# Patient Record
Sex: FEMALE | Race: ASIAN | NOT HISPANIC OR LATINO
[De-identification: names, ages, dates, MRNs, and addresses within clinical notes are randomized per-mention and may not be internally consistent; named-entity substitution may affect disease eponyms.]

---

## 2017-05-22 PROBLEM — Z00.00 ENCOUNTER FOR PREVENTIVE HEALTH EXAMINATION: Status: ACTIVE | Noted: 2017-05-22

## 2017-05-24 ENCOUNTER — APPOINTMENT (OUTPATIENT)
Dept: ULTRASOUND IMAGING | Facility: IMAGING CENTER | Age: 47
End: 2017-05-24

## 2017-05-24 ENCOUNTER — APPOINTMENT (OUTPATIENT)
Dept: MAMMOGRAPHY | Facility: IMAGING CENTER | Age: 47
End: 2017-05-24

## 2017-06-09 ENCOUNTER — OUTPATIENT (OUTPATIENT)
Dept: OUTPATIENT SERVICES | Facility: HOSPITAL | Age: 47
LOS: 1 days | End: 2017-06-09
Payer: MEDICAID

## 2017-06-09 ENCOUNTER — APPOINTMENT (OUTPATIENT)
Dept: ULTRASOUND IMAGING | Facility: IMAGING CENTER | Age: 47
End: 2017-06-09

## 2017-06-09 ENCOUNTER — RESULT REVIEW (OUTPATIENT)
Age: 47
End: 2017-06-09

## 2017-06-09 DIAGNOSIS — Z00.8 ENCOUNTER FOR OTHER GENERAL EXAMINATION: ICD-10-CM

## 2017-06-09 PROCEDURE — 88360 TUMOR IMMUNOHISTOCHEM/MANUAL: CPT

## 2017-06-09 PROCEDURE — 77065 DX MAMMO INCL CAD UNI: CPT

## 2017-06-09 PROCEDURE — 19083 BX BREAST 1ST LESION US IMAG: CPT

## 2017-06-09 PROCEDURE — A4648: CPT

## 2017-06-09 PROCEDURE — 88305 TISSUE EXAM BY PATHOLOGIST: CPT

## 2017-07-03 ENCOUNTER — APPOINTMENT (OUTPATIENT)
Dept: SURGICAL ONCOLOGY | Facility: CLINIC | Age: 47
End: 2017-07-03

## 2017-07-03 VITALS
DIASTOLIC BLOOD PRESSURE: 103 MMHG | HEART RATE: 60 BPM | HEIGHT: 62 IN | BODY MASS INDEX: 28.52 KG/M2 | WEIGHT: 155 LBS | SYSTOLIC BLOOD PRESSURE: 181 MMHG | RESPIRATION RATE: 15 BRPM

## 2017-07-05 ENCOUNTER — APPOINTMENT (OUTPATIENT)
Dept: SURGERY | Facility: CLINIC | Age: 47
End: 2017-07-05

## 2017-07-14 ENCOUNTER — APPOINTMENT (OUTPATIENT)
Dept: MRI IMAGING | Facility: IMAGING CENTER | Age: 47
End: 2017-07-14

## 2017-07-14 ENCOUNTER — OUTPATIENT (OUTPATIENT)
Dept: OUTPATIENT SERVICES | Facility: HOSPITAL | Age: 47
LOS: 1 days | End: 2017-07-14
Payer: MEDICAID

## 2017-07-14 DIAGNOSIS — C50.912 MALIGNANT NEOPLASM OF UNSPECIFIED SITE OF LEFT FEMALE BREAST: ICD-10-CM

## 2017-07-14 PROCEDURE — C8908: CPT

## 2017-07-14 PROCEDURE — C8937: CPT

## 2017-07-14 PROCEDURE — A9585: CPT

## 2017-07-17 ENCOUNTER — OUTPATIENT (OUTPATIENT)
Dept: OUTPATIENT SERVICES | Facility: HOSPITAL | Age: 47
LOS: 1 days | End: 2017-07-17
Payer: MEDICAID

## 2017-07-17 VITALS
HEIGHT: 62 IN | WEIGHT: 154.98 LBS | OXYGEN SATURATION: 100 % | TEMPERATURE: 100 F | RESPIRATION RATE: 18 BRPM | SYSTOLIC BLOOD PRESSURE: 134 MMHG | HEART RATE: 78 BPM | DIASTOLIC BLOOD PRESSURE: 90 MMHG

## 2017-07-17 DIAGNOSIS — D49.3 NEOPLASM OF UNSPECIFIED BEHAVIOR OF BREAST: ICD-10-CM

## 2017-07-17 DIAGNOSIS — Z01.818 ENCOUNTER FOR OTHER PREPROCEDURAL EXAMINATION: ICD-10-CM

## 2017-07-17 DIAGNOSIS — C50.912 MALIGNANT NEOPLASM OF UNSPECIFIED SITE OF LEFT FEMALE BREAST: ICD-10-CM

## 2017-07-17 LAB
HCT VFR BLD CALC: 37.7 % — SIGNIFICANT CHANGE UP (ref 34.5–45)
HGB BLD-MCNC: 12.4 G/DL — SIGNIFICANT CHANGE UP (ref 11.5–15.5)
MCHC RBC-ENTMCNC: 27.8 PG — SIGNIFICANT CHANGE UP (ref 27–34)
MCHC RBC-ENTMCNC: 32.9 GM/DL — SIGNIFICANT CHANGE UP (ref 32–36)
MCV RBC AUTO: 84.5 FL — SIGNIFICANT CHANGE UP (ref 80–100)
PLATELET # BLD AUTO: 278 K/UL — SIGNIFICANT CHANGE UP (ref 150–400)
RBC # BLD: 4.46 M/UL — SIGNIFICANT CHANGE UP (ref 3.8–5.2)
RBC # FLD: 13.6 % — SIGNIFICANT CHANGE UP (ref 10.3–14.5)
WBC # BLD: 11.16 K/UL — HIGH (ref 3.8–10.5)
WBC # FLD AUTO: 11.16 K/UL — HIGH (ref 3.8–10.5)

## 2017-07-17 PROCEDURE — 85027 COMPLETE CBC AUTOMATED: CPT

## 2017-07-17 PROCEDURE — G0463: CPT

## 2017-07-17 NOTE — H&P PST ADULT - NSANTHOSAYNRD_GEN_A_CORE
No. YAZAN screening performed.  STOP BANG Legend: 0-2 = LOW Risk; 3-4 = INTERMEDIATE Risk; 5-8 = HIGH Risk

## 2017-07-18 ENCOUNTER — RESULT REVIEW (OUTPATIENT)
Age: 47
End: 2017-07-18

## 2017-07-18 ENCOUNTER — OUTPATIENT (OUTPATIENT)
Dept: OUTPATIENT SERVICES | Facility: HOSPITAL | Age: 47
LOS: 1 days | End: 2017-07-18
Payer: MEDICAID

## 2017-07-18 ENCOUNTER — APPOINTMENT (OUTPATIENT)
Dept: MRI IMAGING | Facility: IMAGING CENTER | Age: 47
End: 2017-07-18

## 2017-07-18 DIAGNOSIS — C50.912 MALIGNANT NEOPLASM OF UNSPECIFIED SITE OF LEFT FEMALE BREAST: ICD-10-CM

## 2017-07-18 PROCEDURE — 19085 BX BREAST 1ST LESION MR IMAG: CPT

## 2017-07-18 PROCEDURE — 77065 DX MAMMO INCL CAD UNI: CPT

## 2017-07-18 PROCEDURE — 88360 TUMOR IMMUNOHISTOCHEM/MANUAL: CPT

## 2017-07-18 PROCEDURE — 88305 TISSUE EXAM BY PATHOLOGIST: CPT

## 2017-07-18 PROCEDURE — A9585: CPT

## 2017-07-19 ENCOUNTER — CHART COPY (OUTPATIENT)
Age: 47
End: 2017-07-19

## 2017-07-19 LAB — SURGICAL PATHOLOGY STUDY: SIGNIFICANT CHANGE UP

## 2017-07-24 ENCOUNTER — APPOINTMENT (OUTPATIENT)
Dept: NUCLEAR MEDICINE | Facility: HOSPITAL | Age: 47
End: 2017-07-24

## 2017-07-24 ENCOUNTER — APPOINTMENT (OUTPATIENT)
Dept: SURGICAL ONCOLOGY | Facility: HOSPITAL | Age: 47
End: 2017-07-24

## 2017-07-24 ENCOUNTER — APPOINTMENT (OUTPATIENT)
Dept: SURGICAL ONCOLOGY | Facility: CLINIC | Age: 47
End: 2017-07-24
Payer: MEDICAID

## 2017-07-24 VITALS
DIASTOLIC BLOOD PRESSURE: 103 MMHG | HEIGHT: 62 IN | OXYGEN SATURATION: 100 % | WEIGHT: 150 LBS | TEMPERATURE: 98.4 F | RESPIRATION RATE: 16 BRPM | BODY MASS INDEX: 27.6 KG/M2 | SYSTOLIC BLOOD PRESSURE: 166 MMHG | HEART RATE: 74 BPM

## 2017-07-24 DIAGNOSIS — N64.4 MASTODYNIA: ICD-10-CM

## 2017-07-24 PROCEDURE — 99214 OFFICE O/P EST MOD 30 MIN: CPT

## 2017-07-25 ENCOUNTER — TRANSCRIPTION ENCOUNTER (OUTPATIENT)
Age: 47
End: 2017-07-25

## 2017-08-02 ENCOUNTER — APPOINTMENT (OUTPATIENT)
Dept: PLASTIC SURGERY | Facility: CLINIC | Age: 47
End: 2017-08-02
Payer: MEDICAID

## 2017-08-02 PROCEDURE — 99204 OFFICE O/P NEW MOD 45 MIN: CPT

## 2017-08-05 ENCOUNTER — OUTPATIENT (OUTPATIENT)
Dept: OUTPATIENT SERVICES | Facility: HOSPITAL | Age: 47
LOS: 1 days | End: 2017-08-05
Payer: MEDICAID

## 2017-08-05 ENCOUNTER — APPOINTMENT (OUTPATIENT)
Dept: CT IMAGING | Facility: IMAGING CENTER | Age: 47
End: 2017-08-05
Payer: MEDICAID

## 2017-08-05 DIAGNOSIS — Z00.8 ENCOUNTER FOR OTHER GENERAL EXAMINATION: ICD-10-CM

## 2017-08-05 PROCEDURE — 74174 CTA ABD&PLVS W/CONTRAST: CPT | Mod: 26

## 2017-08-05 PROCEDURE — 74174 CTA ABD&PLVS W/CONTRAST: CPT

## 2017-08-15 ENCOUNTER — APPOINTMENT (OUTPATIENT)
Dept: VASCULAR SURGERY | Facility: CLINIC | Age: 47
End: 2017-08-15
Payer: MEDICAID

## 2017-08-15 VITALS — HEART RATE: 69 BPM | DIASTOLIC BLOOD PRESSURE: 99 MMHG | SYSTOLIC BLOOD PRESSURE: 180 MMHG

## 2017-08-15 VITALS — HEIGHT: 62 IN | RESPIRATION RATE: 16 BRPM | BODY MASS INDEX: 27.6 KG/M2 | TEMPERATURE: 98.6 F | WEIGHT: 150 LBS

## 2017-08-15 PROCEDURE — 99204 OFFICE O/P NEW MOD 45 MIN: CPT

## 2017-08-15 RX ORDER — HYDROCHLOROTHIAZIDE 25 MG
25 TABLET ORAL
Refills: 0 | Status: COMPLETED | COMMUNITY
End: 2017-08-15

## 2017-08-17 ENCOUNTER — APPOINTMENT (OUTPATIENT)
Dept: INTERVENTIONAL RADIOLOGY/VASCULAR | Facility: CLINIC | Age: 47
End: 2017-08-17
Payer: MEDICAID

## 2017-08-17 VITALS
WEIGHT: 150 LBS | HEIGHT: 66 IN | OXYGEN SATURATION: 100 % | BODY MASS INDEX: 24.11 KG/M2 | TEMPERATURE: 98.5 F | RESPIRATION RATE: 16 BRPM | DIASTOLIC BLOOD PRESSURE: 91 MMHG | HEART RATE: 68 BPM | SYSTOLIC BLOOD PRESSURE: 170 MMHG

## 2017-08-17 DIAGNOSIS — Z63.4 DISAPPEARANCE AND DEATH OF FAMILY MEMBER: ICD-10-CM

## 2017-08-17 DIAGNOSIS — R93.5 ABNORMAL FINDINGS ON DIAGNOSTIC IMAGING OF OTHER ABDOMINAL REGIONS, INCLUDING RETROPERITONEUM: ICD-10-CM

## 2017-08-17 PROCEDURE — 99244 OFF/OP CNSLTJ NEW/EST MOD 40: CPT

## 2017-08-17 RX ORDER — SIMVASTATIN 10 MG/1
10 TABLET, FILM COATED ORAL
Refills: 0 | Status: ACTIVE | COMMUNITY

## 2017-08-17 SDOH — SOCIAL STABILITY - SOCIAL INSECURITY: DISSAPEARANCE AND DEATH OF FAMILY MEMBER: Z63.4

## 2017-08-21 ENCOUNTER — APPOINTMENT (OUTPATIENT)
Dept: NEPHROLOGY | Facility: CLINIC | Age: 47
End: 2017-08-21
Payer: MEDICAID

## 2017-08-21 VITALS
HEIGHT: 66 IN | BODY MASS INDEX: 24.11 KG/M2 | SYSTOLIC BLOOD PRESSURE: 142 MMHG | WEIGHT: 150 LBS | DIASTOLIC BLOOD PRESSURE: 82 MMHG | HEART RATE: 70 BPM

## 2017-08-21 VITALS — DIASTOLIC BLOOD PRESSURE: 84 MMHG | SYSTOLIC BLOOD PRESSURE: 140 MMHG | HEART RATE: 70 BPM

## 2017-08-21 PROCEDURE — 36415 COLL VENOUS BLD VENIPUNCTURE: CPT

## 2017-08-21 PROCEDURE — 99205 OFFICE O/P NEW HI 60 MIN: CPT | Mod: 25

## 2017-08-22 LAB
ALBUMIN SERPL ELPH-MCNC: 4.4 G/DL
ALDOSTERONE SERUM: 10.8 NG/DL
ANION GAP SERPL CALC-SCNC: 14 MMOL/L
APPEARANCE: CLEAR
BACTERIA: NEGATIVE
BILIRUBIN URINE: NEGATIVE
BLOOD URINE: ABNORMAL
BUN SERPL-MCNC: 11 MG/DL
CALCIUM SERPL-MCNC: 10.1 MG/DL
CHLORIDE SERPL-SCNC: 100 MMOL/L
CO2 SERPL-SCNC: 25 MMOL/L
COLOR: YELLOW
CREAT SERPL-MCNC: 0.76 MG/DL
CREAT SPEC-SCNC: 38 MG/DL
GLUCOSE QUALITATIVE U: NORMAL MG/DL
GLUCOSE SERPL-MCNC: 93 MG/DL
HYALINE CASTS: 0 /LPF
KETONES URINE: NEGATIVE
LEUKOCYTE ESTERASE URINE: NEGATIVE
MICROALBUMIN 24H UR DL<=1MG/L-MCNC: 1.7 MG/DL
MICROALBUMIN/CREAT 24H UR-RTO: 45 MG/G
MICROSCOPIC-UA: NORMAL
NITRITE URINE: NEGATIVE
PH URINE: 7
PHOSPHATE SERPL-MCNC: 3.7 MG/DL
POTASSIUM SERPL-SCNC: 4.7 MMOL/L
PROTEIN URINE: NEGATIVE MG/DL
RED BLOOD CELLS URINE: 1 /HPF
SODIUM SERPL-SCNC: 139 MMOL/L
SPECIFIC GRAVITY URINE: 1.01
SQUAMOUS EPITHELIAL CELLS: 0 /HPF
URATE SERPL-MCNC: 4.3 MG/DL
UROBILINOGEN URINE: NORMAL MG/DL
WHITE BLOOD CELLS URINE: 0 /HPF

## 2017-08-25 LAB — RENIN ACTIVITY, PLASMA: 1.69 NG/ML/HR

## 2017-08-27 LAB
METANEPHRINE, PL: 14 PG/ML
NORMETANEPHRINE, PL: 138 PG/ML

## 2017-08-28 ENCOUNTER — APPOINTMENT (OUTPATIENT)
Dept: NUCLEAR MEDICINE | Facility: HOSPITAL | Age: 47
End: 2017-08-28

## 2017-09-18 ENCOUNTER — OUTPATIENT (OUTPATIENT)
Dept: OUTPATIENT SERVICES | Facility: HOSPITAL | Age: 47
LOS: 1 days | End: 2017-09-18
Payer: MEDICAID

## 2017-09-18 VITALS
SYSTOLIC BLOOD PRESSURE: 152 MMHG | OXYGEN SATURATION: 100 % | RESPIRATION RATE: 18 BRPM | WEIGHT: 149.91 LBS | HEART RATE: 60 BPM | DIASTOLIC BLOOD PRESSURE: 88 MMHG | HEIGHT: 62 IN

## 2017-09-18 DIAGNOSIS — Z01.818 ENCOUNTER FOR OTHER PREPROCEDURAL EXAMINATION: ICD-10-CM

## 2017-09-18 DIAGNOSIS — C50.912 MALIGNANT NEOPLASM OF UNSPECIFIED SITE OF LEFT FEMALE BREAST: ICD-10-CM

## 2017-09-18 DIAGNOSIS — C80.1 MALIGNANT (PRIMARY) NEOPLASM, UNSPECIFIED: ICD-10-CM

## 2017-09-18 LAB
ANION GAP SERPL CALC-SCNC: 5 MMOL/L — SIGNIFICANT CHANGE UP (ref 5–17)
APTT BLD: 34.6 SEC — SIGNIFICANT CHANGE UP (ref 27.5–37.4)
BUN SERPL-MCNC: 8 MG/DL — SIGNIFICANT CHANGE UP (ref 7–18)
CALCIUM SERPL-MCNC: 9.5 MG/DL — SIGNIFICANT CHANGE UP (ref 8.4–10.5)
CHLORIDE SERPL-SCNC: 111 MMOL/L — HIGH (ref 96–108)
CO2 SERPL-SCNC: 27 MMOL/L — SIGNIFICANT CHANGE UP (ref 22–31)
CREAT SERPL-MCNC: 0.69 MG/DL — SIGNIFICANT CHANGE UP (ref 0.5–1.3)
GLUCOSE SERPL-MCNC: 86 MG/DL — SIGNIFICANT CHANGE UP (ref 70–99)
HCT VFR BLD CALC: 39.9 % — SIGNIFICANT CHANGE UP (ref 34.5–45)
HGB BLD-MCNC: 13 G/DL — SIGNIFICANT CHANGE UP (ref 11.5–15.5)
INR BLD: 1.11 RATIO — SIGNIFICANT CHANGE UP (ref 0.88–1.16)
MCHC RBC-ENTMCNC: 28.8 PG — SIGNIFICANT CHANGE UP (ref 27–34)
MCHC RBC-ENTMCNC: 32.7 GM/DL — SIGNIFICANT CHANGE UP (ref 32–36)
MCV RBC AUTO: 87.9 FL — SIGNIFICANT CHANGE UP (ref 80–100)
PLATELET # BLD AUTO: 255 K/UL — SIGNIFICANT CHANGE UP (ref 150–400)
POTASSIUM SERPL-MCNC: 4.4 MMOL/L — SIGNIFICANT CHANGE UP (ref 3.5–5.3)
POTASSIUM SERPL-SCNC: 4.4 MMOL/L — SIGNIFICANT CHANGE UP (ref 3.5–5.3)
PROTHROM AB SERPL-ACNC: 12.1 SEC — SIGNIFICANT CHANGE UP (ref 9.8–12.7)
RBC # BLD: 4.54 M/UL — SIGNIFICANT CHANGE UP (ref 3.8–5.2)
RBC # FLD: 13.5 % — SIGNIFICANT CHANGE UP (ref 10.3–14.5)
SODIUM SERPL-SCNC: 143 MMOL/L — SIGNIFICANT CHANGE UP (ref 135–145)
WBC # BLD: 10.6 K/UL — HIGH (ref 3.8–10.5)
WBC # FLD AUTO: 10.6 K/UL — HIGH (ref 3.8–10.5)

## 2017-09-18 PROCEDURE — 85730 THROMBOPLASTIN TIME PARTIAL: CPT

## 2017-09-18 PROCEDURE — 85027 COMPLETE CBC AUTOMATED: CPT

## 2017-09-18 PROCEDURE — 86850 RBC ANTIBODY SCREEN: CPT

## 2017-09-18 PROCEDURE — G0463: CPT

## 2017-09-18 PROCEDURE — 86901 BLOOD TYPING SEROLOGIC RH(D): CPT

## 2017-09-18 PROCEDURE — 86900 BLOOD TYPING SEROLOGIC ABO: CPT

## 2017-09-18 PROCEDURE — 80048 BASIC METABOLIC PNL TOTAL CA: CPT

## 2017-09-18 PROCEDURE — 85610 PROTHROMBIN TIME: CPT

## 2017-09-18 RX ORDER — FAMOTIDINE 10 MG/ML
0 INJECTION INTRAVENOUS
Qty: 0 | Refills: 0 | COMMUNITY

## 2017-09-18 NOTE — H&P PST ADULT - RS GEN PE MLT RESP DETAILS PC
no chest wall tenderness/clear to auscultation bilaterally/respirations non-labored/breath sounds equal/airway patent/good air movement/normal

## 2017-09-18 NOTE — H&P PST ADULT - ASSESSMENT
48 y/o female with h/o HTN, and Malignant neoplasm of the left breast is scheduled for left breast mastectomy on 9/28/17

## 2017-09-18 NOTE — H&P PST ADULT - HISTORY OF PRESENT ILLNESS
Pt presented with c/o tumor on the left breast, since 4 months, which was noticed in the mammogram. Pt did not feel the lump before the mammo. But now pt states that she is able to feel the lump. Pt also has pain on the  breast 7/10

## 2017-09-25 ENCOUNTER — APPOINTMENT (OUTPATIENT)
Dept: NEPHROLOGY | Facility: CLINIC | Age: 47
End: 2017-09-25
Payer: MEDICAID

## 2017-09-25 VITALS
OXYGEN SATURATION: 100 % | DIASTOLIC BLOOD PRESSURE: 86 MMHG | BODY MASS INDEX: 25.23 KG/M2 | SYSTOLIC BLOOD PRESSURE: 134 MMHG | HEART RATE: 67 BPM | WEIGHT: 157 LBS | HEIGHT: 66 IN

## 2017-09-25 PROCEDURE — 99214 OFFICE O/P EST MOD 30 MIN: CPT

## 2017-09-27 RX ORDER — SODIUM CHLORIDE 9 MG/ML
3 INJECTION INTRAMUSCULAR; INTRAVENOUS; SUBCUTANEOUS EVERY 8 HOURS
Qty: 0 | Refills: 0 | Status: DISCONTINUED | OUTPATIENT
Start: 2017-09-28 | End: 2017-10-03

## 2017-09-28 ENCOUNTER — TRANSCRIPTION ENCOUNTER (OUTPATIENT)
Age: 47
End: 2017-09-28

## 2017-09-28 ENCOUNTER — RESULT REVIEW (OUTPATIENT)
Age: 47
End: 2017-09-28

## 2017-09-28 ENCOUNTER — INPATIENT (INPATIENT)
Facility: HOSPITAL | Age: 47
LOS: 4 days | Discharge: HOME HEALTH | DRG: 571 | End: 2017-10-03
Attending: SPECIALIST | Admitting: SPECIALIST
Payer: MEDICAID

## 2017-09-28 ENCOUNTER — APPOINTMENT (OUTPATIENT)
Dept: SURGICAL ONCOLOGY | Facility: HOSPITAL | Age: 47
End: 2017-09-28
Payer: MEDICAID

## 2017-09-28 VITALS
DIASTOLIC BLOOD PRESSURE: 80 MMHG | SYSTOLIC BLOOD PRESSURE: 152 MMHG | WEIGHT: 149.91 LBS | TEMPERATURE: 98 F | HEART RATE: 69 BPM | HEIGHT: 62 IN | OXYGEN SATURATION: 100 % | RESPIRATION RATE: 18 BRPM

## 2017-09-28 DIAGNOSIS — I10 ESSENTIAL (PRIMARY) HYPERTENSION: ICD-10-CM

## 2017-09-28 DIAGNOSIS — C50.912 MALIGNANT NEOPLASM OF UNSPECIFIED SITE OF LEFT FEMALE BREAST: ICD-10-CM

## 2017-09-28 DIAGNOSIS — Z90.12 ACQUIRED ABSENCE OF LEFT BREAST AND NIPPLE: Chronic | ICD-10-CM

## 2017-09-28 DIAGNOSIS — Z29.9 ENCOUNTER FOR PROPHYLACTIC MEASURES, UNSPECIFIED: ICD-10-CM

## 2017-09-28 LAB
BLD GP AB SCN SERPL QL: SIGNIFICANT CHANGE UP
HCG UR QL: NEGATIVE — SIGNIFICANT CHANGE UP

## 2017-09-28 PROCEDURE — 38790 INJECT FOR LYMPHATIC X-RAY: CPT | Mod: 59

## 2017-09-28 PROCEDURE — 38900 IO MAP OF SENT LYMPH NODE: CPT

## 2017-09-28 PROCEDURE — S2068: CPT | Mod: 62,LT

## 2017-09-28 PROCEDURE — 49999 UNLISTED PX ABD PERTM&OMN: CPT

## 2017-09-28 PROCEDURE — 78195 LYMPH SYSTEM IMAGING: CPT | Mod: 26

## 2017-09-28 PROCEDURE — 88331 PATH CONSLTJ SURG 1 BLK 1SPC: CPT | Mod: 26

## 2017-09-28 PROCEDURE — 38308 INCISION OF LYMPH CHANNELS: CPT

## 2017-09-28 PROCEDURE — S2068: CPT | Mod: LT

## 2017-09-28 PROCEDURE — 88305 TISSUE EXAM BY PATHOLOGIST: CPT | Mod: 26

## 2017-09-28 PROCEDURE — 88334 PATH CONSLTJ SURG CYTO XM EA: CPT | Mod: 26,59

## 2017-09-28 PROCEDURE — 19307 MAST MOD RAD: CPT | Mod: LT

## 2017-09-28 PROCEDURE — 38792 RA TRACER ID OF SENTINL NODE: CPT | Mod: 59

## 2017-09-28 PROCEDURE — 88307 TISSUE EXAM BY PATHOLOGIST: CPT | Mod: 26

## 2017-09-28 PROCEDURE — 38530 BIOPSY/REMOVAL LYMPH NODES: CPT | Mod: LT

## 2017-09-28 RX ORDER — ENOXAPARIN SODIUM 100 MG/ML
40 INJECTION SUBCUTANEOUS DAILY
Qty: 0 | Refills: 0 | Status: DISCONTINUED | OUTPATIENT
Start: 2017-09-29 | End: 2017-10-03

## 2017-09-28 RX ORDER — SODIUM CHLORIDE 9 MG/ML
3 INJECTION INTRAMUSCULAR; INTRAVENOUS; SUBCUTANEOUS EVERY 8 HOURS
Qty: 0 | Refills: 0 | Status: DISCONTINUED | OUTPATIENT
Start: 2017-09-28 | End: 2017-09-28

## 2017-09-28 RX ORDER — HYDROMORPHONE HYDROCHLORIDE 2 MG/ML
0.5 INJECTION INTRAMUSCULAR; INTRAVENOUS; SUBCUTANEOUS
Qty: 0 | Refills: 0 | Status: DISCONTINUED | OUTPATIENT
Start: 2017-09-28 | End: 2017-09-30

## 2017-09-28 RX ORDER — ONDANSETRON 8 MG/1
4 TABLET, FILM COATED ORAL EVERY 6 HOURS
Qty: 0 | Refills: 0 | Status: DISCONTINUED | OUTPATIENT
Start: 2017-09-28 | End: 2017-09-30

## 2017-09-28 RX ORDER — ACETAMINOPHEN 500 MG
1000 TABLET ORAL ONCE
Qty: 0 | Refills: 0 | Status: COMPLETED | OUTPATIENT
Start: 2017-09-28 | End: 2017-09-29

## 2017-09-28 RX ORDER — SODIUM CHLORIDE 9 MG/ML
1000 INJECTION INTRAMUSCULAR; INTRAVENOUS; SUBCUTANEOUS
Qty: 0 | Refills: 0 | Status: DISCONTINUED | OUTPATIENT
Start: 2017-09-28 | End: 2017-09-29

## 2017-09-28 RX ORDER — NALOXONE HYDROCHLORIDE 4 MG/.1ML
0.1 SPRAY NASAL
Qty: 0 | Refills: 0 | Status: DISCONTINUED | OUTPATIENT
Start: 2017-09-28 | End: 2017-10-02

## 2017-09-28 RX ORDER — KETOROLAC TROMETHAMINE 30 MG/ML
30 SYRINGE (ML) INJECTION EVERY 6 HOURS
Qty: 0 | Refills: 0 | Status: DISCONTINUED | OUTPATIENT
Start: 2017-09-28 | End: 2017-09-30

## 2017-09-28 RX ORDER — ONDANSETRON 8 MG/1
4 TABLET, FILM COATED ORAL EVERY 6 HOURS
Qty: 0 | Refills: 0 | Status: DISCONTINUED | OUTPATIENT
Start: 2017-09-28 | End: 2017-10-02

## 2017-09-28 RX ORDER — ONDANSETRON 8 MG/1
4 TABLET, FILM COATED ORAL ONCE
Qty: 0 | Refills: 0 | Status: DISCONTINUED | OUTPATIENT
Start: 2017-09-28 | End: 2017-09-30

## 2017-09-28 RX ORDER — HYDROMORPHONE HYDROCHLORIDE 2 MG/ML
30 INJECTION INTRAMUSCULAR; INTRAVENOUS; SUBCUTANEOUS
Qty: 0 | Refills: 0 | Status: DISCONTINUED | OUTPATIENT
Start: 2017-09-28 | End: 2017-09-28

## 2017-09-28 RX ORDER — MORPHINE SULFATE 50 MG/1
2 CAPSULE, EXTENDED RELEASE ORAL EVERY 4 HOURS
Qty: 0 | Refills: 0 | Status: DISCONTINUED | OUTPATIENT
Start: 2017-09-28 | End: 2017-10-03

## 2017-09-28 RX ADMIN — SODIUM CHLORIDE 3 MILLILITER(S): 9 INJECTION INTRAMUSCULAR; INTRAVENOUS; SUBCUTANEOUS at 21:59

## 2017-09-28 RX ADMIN — SODIUM CHLORIDE 125 MILLILITER(S): 9 INJECTION INTRAMUSCULAR; INTRAVENOUS; SUBCUTANEOUS at 20:00

## 2017-09-28 NOTE — BRIEF OPERATIVE NOTE - PROCEDURE
<<-----Click on this checkbox to enter Procedure SE flap, free  09/28/2017  right SE flap to left breast for breast reconstruction after mastectomy  Active  [email or web address]

## 2017-09-28 NOTE — CONSULT NOTE ADULT - ASSESSMENT
47 year old female medical history of HTN and HLD, found to have a left sided breast carcinoma and was scheduled for left mastectomy today. She noticed tumour on the breast 4 months ago on a mammogram and presented with a 7/10 pain over the breast. 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 She had positive sentinel lymph node. Procedures done today: Axillary lymph node dissection on left  and Left mastectomy with axillary lymph node dissection. Plastics were called a abdominal flap was used for resconstruciton of the breast. 4 JADE drains were placed; 2 over the left breast and 2 both sides of the abdominal flap. Patient was given IV fluids but no intra-op antibiotics or blood. She was intubated for the procedure but was successfully extubated in the recovery room. She has a left radial line for monitoring and a Vioptex device for  perfusion monitoring of the surgical site. She was transferred to ICU for post-op monitoring.

## 2017-09-28 NOTE — BRIEF OPERATIVE NOTE - PROCEDURE
<<-----Click on this checkbox to enter Procedure Axillary lymph node dissection on left  09/28/2017    Active  [email or web address]  Left mastectomy with axillary lymph node dissection  09/28/2017    Active  [email or web address]

## 2017-09-28 NOTE — PROGRESS NOTE ADULT - SUBJECTIVE AND OBJECTIVE BOX
POSTOP NOTE    Patient is doing okay.  S/P Left Mastectomy, [place] biopsy & SE Flap      Vital Signs Last 24 Hrs  T(C): 36.4 (28 Sep 2017 21:26), Max: 36.7 (28 Sep 2017 09:18)  T(F): 97.5 (28 Sep 2017 21:26), Max: 98 (28 Sep 2017 09:18)  HR: 82 (28 Sep 2017 23:00) (69 - 105)  BP: 98/55 (28 Sep 2017 23:00) (97/57 - 152/80)  BP(mean): 64 (28 Sep 2017 23:00) (64 - 74)  RR: 15 (28 Sep 2017 23:00) (15 - 23)  SpO2: 100% (28 Sep 2017 23:00) (99% - 100%)    Daily Height in cm: 157.48 (28 Sep 2017 09:18)    Daily Weight in k.9 (28 Sep 2017 20:00)    I&O's Detail    28 Sep 2017 07:01  -  28 Sep 2017 23:37  --------------------------------------------------------  IN:    sodium chloride 0.9%.: 500 mL  Total IN: 500 mL    OUT:    Bulb: 30 mL    Bulb: 60 mL    Bulb: 60 mL    Indwelling Catheter - Urethral: 235 mL  Total OUT: 385 mL    Total NET: 115 mL          MEDICATIONS  (STANDING):  sodium chloride 0.9% lock flush 3 milliLiter(s) IV Push every 8 hours  acetaminophen  IVPB. 1000 milliGRAM(s) IV Intermittent once  ketorolac   Injectable 30 milliGRAM(s) IV Push every 6 hours  sodium chloride 0.9%. 1000 milliLiter(s) (125 mL/Hr) IV Continuous <Continuous>    MEDICATIONS  (PRN):  HYDROmorphone  Injectable 0.5 milliGRAM(s) IV Push every 10 minutes PRN Severe Pain (7 - 10)  ondansetron Injectable 4 milliGRAM(s) IV Push once PRN Nausea and/or Vomiting  naloxone Injectable 0.1 milliGRAM(s) IV Push every 3 minutes PRN For ANY of the following changes in patient status:  A. RR LESS THAN 10 breaths per minute, B. Oxygen saturation LESS THAN 90%, C. Sedation score of 6  ondansetron Injectable 4 milliGRAM(s) IV Push every 6 hours PRN Nausea  ondansetron Injectable 4 milliGRAM(s) IV Push every 6 hours PRN Nausea  morphine  - Injectable 2 milliGRAM(s) IV Push every 4 hours PRN breakthrough pain      PHYSICAL EXAM:  GENERAL: In no acute distress  CHEST/LUNG: Clear to percussion bilaterally; Normal respiratory effort    Left Breast dressing clean  Bioptic 50%  +Doppler SE arterial bruit    HEART: Regular rate and rhythm  ABDOMEN: Soft, Nontender, Nondistended; Bowel sounds present  EXTREMITIES:  No clubbing, cyanosis, or edema

## 2017-09-28 NOTE — CONSULT NOTE ADULT - SUBJECTIVE AND OBJECTIVE BOX
47 year old female medical history of HTN and HLD, found to have a left sided breast carcinoma and was scheduled for left mastectomy today. She noticed tumour on the breast 4 months ago on a mammogram and presented with a 7/10 pain over the breast. 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 She had positive sentinel lymph node. Procedures done today. Axillary lymph node dissection on left  and Left mastectomy with axillary lymph node dissection. Plastics were called a abdominal flap was used for resconstruciton of the breast. 4 JADE drains were placed; 2 over the left breast and 2 both sides of the abdominal flap. 47 year old female medical history of HTN and HLD, found to have a left sided breast carcinoma and was scheduled for left mastectomy today. She noticed tumour on the breast 4 months ago on a mammogram and presented with a 7/10 pain over the breast. 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 She had positive sentinel lymph node. Procedures done today. Axillary lymph node dissection on left  and Left mastectomy with axillary lymph node dissection. Plastics were called a abdominal flap was used for resconstruciton of the breast. 4 JADE drains were placed; 2 over the left breast and 2 both sides of the abdominal flap. Patient was given IV fluids but no intra-op antibiotics or blood. She was intubated for the procedure but was successfully extubated in the recovery room. She has a left radial line for monitoring and a Vioptex device for  perfusion monitoring of the surgical site. She was transferred to ICU for post-op monitoring.      PMH/PSH: Malignant neoplasm  Hypertension  No pertinent past medical history  No significant past surgical history  ,   Allergy: No Known Allergies  ,   Social history: ,   Family History: No pertinent family history in first degree relatives    Anesthesia reaction (prior) -    ECHO/ stress test: .   Colonoscopy/EGD- .   Code status:     Meds at home:  Meds in hospital:  sodium chloride 0.9% lock flush 3 milliLiter(s) IV Push every 8 hours  HYDROmorphone  Injectable 0.5 milliGRAM(s) IV Push every 10 minutes PRN  ondansetron Injectable 4 milliGRAM(s) IV Push once PRN  HYDROmorphone PCA (1 mG/mL) 30 milliLiter(s) PCA Continuous PCA Continuous  naloxone Injectable 0.1 milliGRAM(s) IV Push every 3 minutes PRN  ondansetron Injectable 4 milliGRAM(s) IV Push every 6 hours PRN  acetaminophen  IVPB. 1000 milliGRAM(s) IV Intermittent once  ketorolac   Injectable 30 milliGRAM(s) IV Push every 6 hours  ondansetron Injectable 4 milliGRAM(s) IV Push every 6 hours PRN  sodium chloride 0.9%. 1000 milliLiter(s) IV Continuous <Continuous>  morphine  - Injectable 2 milliGRAM(s) IV Push every 4 hours PRN      VITALS:  T(F): 97.5 (09-28-17 @ 20:00), Max: 98 (09-28-17 @ 09:18)  HR: 82 (09-28-17 @ 20:45)  BP: 128/58 (09-28-17 @ 20:00)  RR: 18 (09-28-17 @ 20:45)  SpO2: 100% (09-28-17 @ 20:45)  Wt(kg): --    Height (cm): 157.48 (09-28 @ 09:18)  Weight (kg): 71.9 (09-28 @ 20:00)  BMI (kg/m2): 29 (09-28 @ 20:00)  BSA (m2): 1.73 (09-28 @ 20:00)              REVIEW OF SYSTEMS: unable to obtain.         Imaging Personally Reviewed:  [+ ] YES  [ ] NO    Consultant(s) Notes Reviewed:  [+ ] YES  [ ] NO    PHYSICAL EXAM:  GENERAL: NAD, well-groomed, well-developed  HEAD:  Atraumatic, Normocephalic  EYES: EOMI, PERRLA, conjunctiva and sclera clear  ENMT: No tonsillar erythema, exudates, or enlargement; Moist mucous membranes, Good dentition, No lesions  NECK: Supple, No JVD, Normal thyroid  CHEST/LUNG: left breast scar noted. Voptex connection (45 now), 2 JADE drains, draining serosanguinous discharge.. Clear to percussion bilaterally; No rales, rhonchi, wheezing, or rubs  HEART: Regular rate and rhythm; No murmurs, rubs, or gallops  ABDOMEN: surgical scar present. +2 JADE drains, draining serosanguinous discharge.   Soft, Nontender, Nondistended; Bowel sounds present  EXTREMITIES:  2+ Peripheral Pulses, No clubbing, cyanosis, or edema  LYMPH: No lymphadenopathy noted  SKIN: No rashes or lesions    Care Discussed with Consultants/Other Providers [ +] YES  [ ] NO

## 2017-09-28 NOTE — CONSULT NOTE ADULT - PROBLEM SELECTOR RECOMMENDATION 9
s/p Axillary lymph node dissection on left  and Left mastectomy with axillary lymph node dissection. Plastics were called a abdominal flap was used for resconstruciton of the breast  -monitor output from JADE drains   - monitor perfusion via the Vioptix ( keep over 40-44; call surgery for decrease to 30 or below)  - check doppler every hours over the surgical site  - monitor for the skin color   - monitor BP; may give iv fluids but no pressors to prevent vasopressive action and necrosis of the site.   - pain management: toradol and morphine; may start PCA pump if needed   - will given iv tylenol 1g for 4 doses  - Billy Ward

## 2017-09-29 ENCOUNTER — TRANSCRIPTION ENCOUNTER (OUTPATIENT)
Age: 47
End: 2017-09-29

## 2017-09-29 DIAGNOSIS — G89.18 OTHER ACUTE POSTPROCEDURAL PAIN: ICD-10-CM

## 2017-09-29 DIAGNOSIS — Z90.12 ACQUIRED ABSENCE OF LEFT BREAST AND NIPPLE: ICD-10-CM

## 2017-09-29 LAB
24R-OH-CALCIDIOL SERPL-MCNC: 16.2 NG/ML — LOW (ref 30–80)
ALBUMIN SERPL ELPH-MCNC: 2.6 G/DL — LOW (ref 3.5–5)
ALP SERPL-CCNC: 47 U/L — SIGNIFICANT CHANGE UP (ref 40–120)
ALT FLD-CCNC: 21 U/L DA — SIGNIFICANT CHANGE UP (ref 10–60)
ANION GAP SERPL CALC-SCNC: 14 MMOL/L — SIGNIFICANT CHANGE UP (ref 5–17)
AST SERPL-CCNC: 31 U/L — SIGNIFICANT CHANGE UP (ref 10–40)
BASOPHILS # BLD AUTO: 0.1 K/UL — SIGNIFICANT CHANGE UP (ref 0–0.2)
BASOPHILS NFR BLD AUTO: 0.8 % — SIGNIFICANT CHANGE UP (ref 0–2)
BILIRUB SERPL-MCNC: 0.6 MG/DL — SIGNIFICANT CHANGE UP (ref 0.2–1.2)
BUN SERPL-MCNC: 6 MG/DL — LOW (ref 7–18)
CALCIUM SERPL-MCNC: 7.9 MG/DL — LOW (ref 8.4–10.5)
CHLORIDE SERPL-SCNC: 107 MMOL/L — SIGNIFICANT CHANGE UP (ref 96–108)
CHOLEST SERPL-MCNC: 137 MG/DL — SIGNIFICANT CHANGE UP (ref 10–199)
CO2 SERPL-SCNC: 20 MMOL/L — LOW (ref 22–31)
CREAT SERPL-MCNC: 0.97 MG/DL — SIGNIFICANT CHANGE UP (ref 0.5–1.3)
EOSINOPHIL # BLD AUTO: 0 K/UL — SIGNIFICANT CHANGE UP (ref 0–0.5)
EOSINOPHIL NFR BLD AUTO: 0 % — SIGNIFICANT CHANGE UP (ref 0–6)
FOLATE SERPL-MCNC: 14.8 NG/ML — SIGNIFICANT CHANGE UP (ref 4.8–24.2)
GLUCOSE SERPL-MCNC: 152 MG/DL — HIGH (ref 70–99)
HBA1C BLD-MCNC: 6.1 % — HIGH (ref 4–5.6)
HCT VFR BLD CALC: 34 % — LOW (ref 34.5–45)
HDLC SERPL-MCNC: 44 MG/DL — SIGNIFICANT CHANGE UP (ref 40–125)
HGB BLD-MCNC: 11.5 G/DL — SIGNIFICANT CHANGE UP (ref 11.5–15.5)
LIPID PNL WITH DIRECT LDL SERPL: 71 MG/DL — SIGNIFICANT CHANGE UP
LYMPHOCYTES # BLD AUTO: 1.3 K/UL — SIGNIFICANT CHANGE UP (ref 1–3.3)
LYMPHOCYTES # BLD AUTO: 10.4 % — LOW (ref 13–44)
MAGNESIUM SERPL-MCNC: 1.5 MG/DL — LOW (ref 1.6–2.6)
MCHC RBC-ENTMCNC: 30.1 PG — SIGNIFICANT CHANGE UP (ref 27–34)
MCHC RBC-ENTMCNC: 34 GM/DL — SIGNIFICANT CHANGE UP (ref 32–36)
MCV RBC AUTO: 88.6 FL — SIGNIFICANT CHANGE UP (ref 80–100)
MONOCYTES # BLD AUTO: 0.9 K/UL — SIGNIFICANT CHANGE UP (ref 0–0.9)
MONOCYTES NFR BLD AUTO: 7.1 % — SIGNIFICANT CHANGE UP (ref 2–14)
NEUTROPHILS # BLD AUTO: 10.4 K/UL — HIGH (ref 1.8–7.4)
NEUTROPHILS NFR BLD AUTO: 81.8 % — HIGH (ref 43–77)
PHOSPHATE SERPL-MCNC: 2.9 MG/DL — SIGNIFICANT CHANGE UP (ref 2.5–4.5)
PLATELET # BLD AUTO: 178 K/UL — SIGNIFICANT CHANGE UP (ref 150–400)
POTASSIUM SERPL-MCNC: 4.4 MMOL/L — SIGNIFICANT CHANGE UP (ref 3.5–5.3)
POTASSIUM SERPL-SCNC: 4.4 MMOL/L — SIGNIFICANT CHANGE UP (ref 3.5–5.3)
PROT SERPL-MCNC: 6.1 G/DL — SIGNIFICANT CHANGE UP (ref 6–8.3)
RBC # BLD: 3.84 M/UL — SIGNIFICANT CHANGE UP (ref 3.8–5.2)
RBC # FLD: 13.6 % — SIGNIFICANT CHANGE UP (ref 10.3–14.5)
SODIUM SERPL-SCNC: 141 MMOL/L — SIGNIFICANT CHANGE UP (ref 135–145)
TOTAL CHOLESTEROL/HDL RATIO MEASUREMENT: 3.1 RATIO — LOW (ref 3.3–7.1)
TRIGL SERPL-MCNC: 108 MG/DL — SIGNIFICANT CHANGE UP (ref 10–149)
TSH SERPL-MCNC: 0.62 UU/ML — SIGNIFICANT CHANGE UP (ref 0.34–4.82)
VIT B12 SERPL-MCNC: 618 PG/ML — SIGNIFICANT CHANGE UP (ref 243–894)
WBC # BLD: 12.7 K/UL — HIGH (ref 3.8–10.5)
WBC # FLD AUTO: 12.7 K/UL — HIGH (ref 3.8–10.5)

## 2017-09-29 PROCEDURE — 99233 SBSQ HOSP IP/OBS HIGH 50: CPT

## 2017-09-29 PROCEDURE — 71010: CPT | Mod: 26

## 2017-09-29 RX ORDER — MAGNESIUM SULFATE 500 MG/ML
1 VIAL (ML) INJECTION ONCE
Qty: 0 | Refills: 0 | Status: COMPLETED | OUTPATIENT
Start: 2017-09-29 | End: 2017-09-29

## 2017-09-29 RX ORDER — ACETAMINOPHEN 500 MG
1000 TABLET ORAL ONCE
Qty: 0 | Refills: 0 | Status: COMPLETED | OUTPATIENT
Start: 2017-09-29 | End: 2017-09-29

## 2017-09-29 RX ADMIN — SODIUM CHLORIDE 3 MILLILITER(S): 9 INJECTION INTRAMUSCULAR; INTRAVENOUS; SUBCUTANEOUS at 05:20

## 2017-09-29 RX ADMIN — Medication 30 MILLIGRAM(S): at 14:15

## 2017-09-29 RX ADMIN — Medication 30 MILLIGRAM(S): at 23:49

## 2017-09-29 RX ADMIN — Medication 30 MILLIGRAM(S): at 20:23

## 2017-09-29 RX ADMIN — ENOXAPARIN SODIUM 40 MILLIGRAM(S): 100 INJECTION SUBCUTANEOUS at 11:38

## 2017-09-29 RX ADMIN — Medication 30 MILLIGRAM(S): at 11:37

## 2017-09-29 RX ADMIN — Medication 400 MILLIGRAM(S): at 11:32

## 2017-09-29 RX ADMIN — SODIUM CHLORIDE 125 MILLILITER(S): 9 INJECTION INTRAMUSCULAR; INTRAVENOUS; SUBCUTANEOUS at 03:48

## 2017-09-29 RX ADMIN — Medication 100 GRAM(S): at 05:20

## 2017-09-29 RX ADMIN — Medication 1000 MILLIGRAM(S): at 11:45

## 2017-09-29 RX ADMIN — Medication 30 MILLIGRAM(S): at 05:38

## 2017-09-29 RX ADMIN — Medication 30 MILLIGRAM(S): at 18:17

## 2017-09-29 RX ADMIN — Medication 30 MILLIGRAM(S): at 05:19

## 2017-09-29 RX ADMIN — HYDROMORPHONE HYDROCHLORIDE 0.5 MILLIGRAM(S): 2 INJECTION INTRAMUSCULAR; INTRAVENOUS; SUBCUTANEOUS at 21:00

## 2017-09-29 RX ADMIN — Medication 30 MILLIGRAM(S): at 23:52

## 2017-09-29 RX ADMIN — Medication 1000 MILLIGRAM(S): at 04:27

## 2017-09-29 RX ADMIN — SODIUM CHLORIDE 3 MILLILITER(S): 9 INJECTION INTRAMUSCULAR; INTRAVENOUS; SUBCUTANEOUS at 12:01

## 2017-09-29 RX ADMIN — Medication 400 MILLIGRAM(S): at 04:07

## 2017-09-29 RX ADMIN — SODIUM CHLORIDE 3 MILLILITER(S): 9 INJECTION INTRAMUSCULAR; INTRAVENOUS; SUBCUTANEOUS at 20:23

## 2017-09-29 RX ADMIN — HYDROMORPHONE HYDROCHLORIDE 0.5 MILLIGRAM(S): 2 INJECTION INTRAMUSCULAR; INTRAVENOUS; SUBCUTANEOUS at 22:57

## 2017-09-29 NOTE — PROGRESS NOTE ADULT - SUBJECTIVE AND OBJECTIVE BOX
Pt seen and examined at bedside. No new complaints. States that pain is improving.     Flap pink  good doppler signal  good cap refill  Vioptix 38% with 90% signal  no signs of venous congestion    48 yo F POD 1 s/p Left breast mastectomy with axillary LN dissection and SE flap reconstruction    - Continue ICU care for now per Dr. Acevedo  - will continue to reassess  - regular diet, no caffeine

## 2017-09-29 NOTE — PROGRESS NOTE ADULT - SUBJECTIVE AND OBJECTIVE BOX
INTERVAL HPI/OVERNIGHT EVENTS: ***    PRESSORS: [ ] YES [x ] NO  WHICH:    Antimicrobial:    Cardiovascular:    Pulmonary:    Hematalogic:  enoxaparin Injectable 40 milliGRAM(s) SubCutaneous daily    Other:  sodium chloride 0.9% lock flush 3 milliLiter(s) IV Push every 8 hours  HYDROmorphone  Injectable 0.5 milliGRAM(s) IV Push every 10 minutes PRN  ondansetron Injectable 4 milliGRAM(s) IV Push once PRN  naloxone Injectable 0.1 milliGRAM(s) IV Push every 3 minutes PRN  ondansetron Injectable 4 milliGRAM(s) IV Push every 6 hours PRN  ketorolac   Injectable 30 milliGRAM(s) IV Push every 6 hours  ondansetron Injectable 4 milliGRAM(s) IV Push every 6 hours PRN  sodium chloride 0.9%. 1000 milliLiter(s) IV Continuous <Continuous>  morphine  - Injectable 2 milliGRAM(s) IV Push every 4 hours PRN    sodium chloride 0.9% lock flush 3 milliLiter(s) IV Push every 8 hours  HYDROmorphone  Injectable 0.5 milliGRAM(s) IV Push every 10 minutes PRN  ondansetron Injectable 4 milliGRAM(s) IV Push once PRN  naloxone Injectable 0.1 milliGRAM(s) IV Push every 3 minutes PRN  ondansetron Injectable 4 milliGRAM(s) IV Push every 6 hours PRN  ketorolac   Injectable 30 milliGRAM(s) IV Push every 6 hours  ondansetron Injectable 4 milliGRAM(s) IV Push every 6 hours PRN  sodium chloride 0.9%. 1000 milliLiter(s) IV Continuous <Continuous>  morphine  - Injectable 2 milliGRAM(s) IV Push every 4 hours PRN  enoxaparin Injectable 40 milliGRAM(s) SubCutaneous daily    Drug Dosing Weight  Height (cm): 157.48 (28 Sep 2017 09:18)  Weight (kg): 71.9 (28 Sep 2017 20:00)  BMI (kg/m2): 29 (28 Sep 2017 20:00)  BSA (m2): 1.73 (28 Sep 2017 20:00)    CENTRAL LINE: [ ] YES [x ] NO  LOCATION:   DATE INSERTED:  REMOVE: [ ] YES [ ] NO  EXPLAIN:    PICKARD: [x ] YES [ ] NO    DATE INSERTED:9/28  REMOVE:  [ ] YES [ ] NO  EXPLAIN:    A-LINE:  [ ] YES [ x] NO  LOCATION:   DATE INSERTED:  REMOVE:  [ ] YES [ ] NO  EXPLAIN:    PMH -reviewed admission note, no change since admission  Heart faliure: acute [ ] chronic [ ] acute or chronic [ ] diastolic [ ] systolic [ ] combied systolic and diastolic[ ]  ADAM: ATN[ ] renal medullary necrosis [ ] CKD I [ ]CKDII [ ]CKD III [ ]CKD IV [ ]CKD V [ ]Other pathological lesions [ ]  Abdominal Nutrition Status: malnutrition [ ] cachexia [ ] morbid obesity/BMI=40 [ ] Supplement ordered [___________]     ICU Vital Signs Last 24 Hrs  T(C): 36.2 (29 Sep 2017 05:00), Max: 36.7 (28 Sep 2017 09:18)  T(F): 97.1 (29 Sep 2017 05:00), Max: 98 (28 Sep 2017 09:18)  HR: 81 (29 Sep 2017 07:00) (69 - 105)  BP: 102/46 (29 Sep 2017 06:00) (93/50 - 152/80)  BP(mean): 60 (29 Sep 2017 06:00) (60 - 83)  ABP: 115/55 (29 Sep 2017 07:00) (103/56 - 138/74)  ABP(mean): 75 (29 Sep 2017 07:00) (70 - 97)  RR: 13 (29 Sep 2017 07:00) (13 - 23)  SpO2: 100% (29 Sep 2017 07:00) (99% - 100%)            09-28 @ 07:01  -  09-29 @ 07:00  --------------------------------------------------------  IN: 1600 mL / OUT: 950 mL / NET: 650 mL            PHYSICAL EXAM:    GENERAL: [ ]NAD, [ ]well-groomed, [ ]well-developed  HEAD:  [ ]Atraumatic, [ ]Normocephalic  EYES: [ ]EOMI, [ ]PERRLA, [ ]conjunctiva and sclera clear  ENMT: [ ]No tonsillar erythema, exudates, or enlargement; [ ]Moist mucous membranes, [ ]Good dentition, [ ]No lesions  NECK: [ ]Supple, normal appearance, [ ]No JVD; [ ]Normal thyroid; [ ]Trachea midline  NERVOUS SYSTEM:  [ ]Alert & Oriented X3, [ ]Good concentration; [ ]Motor Strength 5/5 B/L upper and lower extremities; [ ]DTRs 2+ intact and symmetric  CHEST/LUNG: [ ]No chest deformity; [ ]Normal percussion bilaterally; [ ]No rales, rhonchi, wheezing   HEART: [ ]Regular rate and rhythm; [ ]No murmurs, rubs, or gallops  ABDOMEN: [ ]Soft, Nontender, Nondistended; [ ]Bowel sounds present  EXTREMITIES:  [ ]2+ Peripheral Pulses, [ ]No clubbing, cyanosis, or edema  LYMPH: [ ]No lymphadenopathy noted  SKIN: [ ]No rashes or lesions; [ ]Good capillary refill      LABS:  CBC Full  -  ( 29 Sep 2017 03:52 )  WBC Count : 12.7 K/uL  Hemoglobin : 11.5 g/dL  Hematocrit : 34.0 %  Platelet Count - Automated : 178 K/uL  Mean Cell Volume : 88.6 fl  Mean Cell Hemoglobin : 30.1 pg  Mean Cell Hemoglobin Concentration : 34.0 gm/dL  Auto Neutrophil # : 10.4 K/uL  Auto Lymphocyte # : 1.3 K/uL  Auto Monocyte # : 0.9 K/uL  Auto Eosinophil # : 0.0 K/uL  Auto Basophil # : 0.1 K/uL  Auto Neutrophil % : 81.8 %  Auto Lymphocyte % : 10.4 %  Auto Monocyte % : 7.1 %  Auto Eosinophil % : 0.0 %  Auto Basophil % : 0.8 %    09-29    141  |  107  |  6<L>  ----------------------------<  152<H>  4.4   |  20<L>  |  0.97    Ca    7.9<L>      29 Sep 2017 03:52  Phos  2.9     09-29  Mg     1.5     09-29    TPro  6.1  /  Alb  2.6<L>  /  TBili  0.6  /  DBili  x   /  AST  31  /  ALT  21  /  AlkPhos  47  09-29            RADIOLOGY & ADDITIONAL STUDIES REVIEWED:  ***    [ ]GOALS OF CARE DISCUSSION WITH PATIENT/FAMILY/PROXY:    CRITICAL CARE TIME SPENT: 35 minutes INTERVAL HPI/OVERNIGHT EVENTS: no overnight events    PRESSORS: [ ] YES [x ] NO  WHICH:    Antimicrobial:    Cardiovascular:    Pulmonary:    Hematalogic:  enoxaparin Injectable 40 milliGRAM(s) SubCutaneous daily    Other:  sodium chloride 0.9% lock flush 3 milliLiter(s) IV Push every 8 hours  HYDROmorphone  Injectable 0.5 milliGRAM(s) IV Push every 10 minutes PRN  ondansetron Injectable 4 milliGRAM(s) IV Push once PRN  naloxone Injectable 0.1 milliGRAM(s) IV Push every 3 minutes PRN  ondansetron Injectable 4 milliGRAM(s) IV Push every 6 hours PRN  ketorolac   Injectable 30 milliGRAM(s) IV Push every 6 hours  ondansetron Injectable 4 milliGRAM(s) IV Push every 6 hours PRN  sodium chloride 0.9%. 1000 milliLiter(s) IV Continuous <Continuous>  morphine  - Injectable 2 milliGRAM(s) IV Push every 4 hours PRN    sodium chloride 0.9% lock flush 3 milliLiter(s) IV Push every 8 hours  HYDROmorphone  Injectable 0.5 milliGRAM(s) IV Push every 10 minutes PRN  ondansetron Injectable 4 milliGRAM(s) IV Push once PRN  naloxone Injectable 0.1 milliGRAM(s) IV Push every 3 minutes PRN  ondansetron Injectable 4 milliGRAM(s) IV Push every 6 hours PRN  ketorolac   Injectable 30 milliGRAM(s) IV Push every 6 hours  ondansetron Injectable 4 milliGRAM(s) IV Push every 6 hours PRN  sodium chloride 0.9%. 1000 milliLiter(s) IV Continuous <Continuous>  morphine  - Injectable 2 milliGRAM(s) IV Push every 4 hours PRN  enoxaparin Injectable 40 milliGRAM(s) SubCutaneous daily    Drug Dosing Weight  Height (cm): 157.48 (28 Sep 2017 09:18)  Weight (kg): 71.9 (28 Sep 2017 20:00)  BMI (kg/m2): 29 (28 Sep 2017 20:00)  BSA (m2): 1.73 (28 Sep 2017 20:00)    CENTRAL LINE: [ ] YES [x ] NO  LOCATION:   DATE INSERTED:  REMOVE: [ ] YES [ ] NO  EXPLAIN:    PICKARD: [x ] YES [ ] NO    DATE INSERTED:9/28  REMOVE:  [x ] YES [ ] NO  EXPLAIN:    A-LINE:  [x ] YES [ ] NO  LOCATION:   DATE INSERTED:  REMOVE:  [ x] YES [ ] NO  EXPLAIN:    PMH -reviewed admission note, no change since admission  Heart faliure: acute [ ] chronic [ ] acute or chronic [ ] diastolic [ ] systolic [ ] combied systolic and diastolic[ ]  ADAM: ATN[ ] renal medullary necrosis [ ] CKD I [ ]CKDII [ ]CKD III [ ]CKD IV [ ]CKD V [ ]Other pathological lesions [ ]  Abdominal Nutrition Status: malnutrition [ ] cachexia [ ] morbid obesity/BMI=40 [ ] Supplement ordered [___________]     ICU Vital Signs Last 24 Hrs  T(C): 36.2 (29 Sep 2017 05:00), Max: 36.7 (28 Sep 2017 09:18)  T(F): 97.1 (29 Sep 2017 05:00), Max: 98 (28 Sep 2017 09:18)  HR: 81 (29 Sep 2017 07:00) (69 - 105)  BP: 102/46 (29 Sep 2017 06:00) (93/50 - 152/80)  BP(mean): 60 (29 Sep 2017 06:00) (60 - 83)  ABP: 115/55 (29 Sep 2017 07:00) (103/56 - 138/74)  ABP(mean): 75 (29 Sep 2017 07:00) (70 - 97)  RR: 13 (29 Sep 2017 07:00) (13 - 23)  SpO2: 100% (29 Sep 2017 07:00) (99% - 100%)            09-28 @ 07:01  -  09-29 @ 07:00  --------------------------------------------------------  IN: 1600 mL / OUT: 950 mL / NET: 650 mL            PHYSICAL EXAM:    GENERAL: [x ]NAD, [x ]well-groomed, [ ]well-developed  HEAD:  [ x]Atraumatic, [x ]Normocephalic  EYES: [x ]EOMI, [ x]PERRLA, [ ]conjunctiva and sclera clear  ENMT: [x ]No tonsillar erythema, exudates, or enlargement; [ ]Moist mucous membranes, [ ]Good dentition, [ ]No lesions  NECK: [x ]Supple, normal appearance, [x ]No JVD; [x ]Normal thyroid; [x ]Trachea midline  NERVOUS SYSTEM:  [ x]Alert & Oriented X3, [ ]Good concentration; [ ]Motor Strength 5/5 B/L upper and lower extremities; [ ]DTRs 2+ intact and symmetric  CHEST/LUNG: [ ]No chest deformity; [ ]Normal percussion bilaterally; [x ]No rales, rhonchi, wheezing [x] JADE drains in place  HEART: [ x]Regular rate and rhythm; [ ]No murmurs, rubs, or gallops  ABDOMEN: [x ]Soft, Nontender, Nondistended; [ ]Bowel sounds present  EXTREMITIES:  [x ]2+ Peripheral Pulses, [ ]No clubbing, cyanosis, or edema  LYMPH: [ ]No lymphadenopathy noted  SKIN: [x ]No rashes or lesions; [x ]Good capillary refill      LABS:  CBC Full  -  ( 29 Sep 2017 03:52 )  WBC Count : 12.7 K/uL  Hemoglobin : 11.5 g/dL  Hematocrit : 34.0 %  Platelet Count - Automated : 178 K/uL  Mean Cell Volume : 88.6 fl  Mean Cell Hemoglobin : 30.1 pg  Mean Cell Hemoglobin Concentration : 34.0 gm/dL  Auto Neutrophil # : 10.4 K/uL  Auto Lymphocyte # : 1.3 K/uL  Auto Monocyte # : 0.9 K/uL  Auto Eosinophil # : 0.0 K/uL  Auto Basophil # : 0.1 K/uL  Auto Neutrophil % : 81.8 %  Auto Lymphocyte % : 10.4 %  Auto Monocyte % : 7.1 %  Auto Eosinophil % : 0.0 %  Auto Basophil % : 0.8 %    09-29    141  |  107  |  6<L>  ----------------------------<  152<H>  4.4   |  20<L>  |  0.97    Ca    7.9<L>      29 Sep 2017 03:52  Phos  2.9     09-29  Mg     1.5     09-29    TPro  6.1  /  Alb  2.6<L>  /  TBili  0.6  /  DBili  x   /  AST  31  /  ALT  21  /  AlkPhos  47  09-29            RADIOLOGY & ADDITIONAL STUDIES REVIEWED:  cxr - no infiltrate or effusion    [ x]GOALS OF CARE DISCUSSION WITH PATIENT/FAMILY/PROXY: discussed plan with family/son at bedside

## 2017-09-29 NOTE — PHYSICAL THERAPY INITIAL EVALUATION ADULT - PASSIVE RANGE OF MOTION EXAMINATION, REHAB EVAL
left shoulder flexion and abduction up to 70 degrees/no Passive ROM deficits were identified/deficits as listed below

## 2017-09-29 NOTE — PROGRESS NOTE ADULT - SUBJECTIVE AND OBJECTIVE BOX
FLAP CHECK    POD 1 for left mastectomy with axillary lymph node dissection and SE flap reconstruction    Flap is pink  good doppler signal  +brisk capillary refill  vioptix - 45%  no venous congestion    -maintain in ICU monitoring for q 1 hour flap checks at least until tomorrow per Dr. Acevedo - will continue to reassess  -may have regular diet, no caffeine

## 2017-09-29 NOTE — PROGRESS NOTE ADULT - SUBJECTIVE AND OBJECTIVE BOX
No significant events overnight  Pain under control    AVSS    Breast - flap is pink, no congestion, warm, + doppler, + brisk cap refill. Vioptic 34%  Shirley serosang  Abd - incision cdi, shirley serosang

## 2017-09-29 NOTE — PROGRESS NOTE ADULT - ATTENDING COMMENTS
47 female with hx of HTN, HLD, breast cancer, admitted for mastectomy with breast reconstruction and SE flap, now doing well.  Will advance diet, d/c fluids, d/c arterial line and bashir, OOB, incentive spirometry, PT.

## 2017-09-29 NOTE — PROGRESS NOTE ADULT - SUBJECTIVE AND OBJECTIVE BOX
46 yo female s/p left mastectomy with positive left sentinel node and subsequent left axillary dissection and SE flap reconstruction. Patient did not have any acute events overnight, pain is well controlled while lying in bed, however, she has pain when she moves around. Spoke with overnight nurse, and overnight the patient continued to have a good doppler signal over the flap as well as good capillary refill. She stated that the vioptic reading went as high as 50 and never below 30. H/H was 11.5/34.    Physical:  Vitals: 97.1; HR: 84; BP: 115/ 55; RR: 17; O2 100%  General: no acute distress  CVS/ lungs: S1/ S2 positive, air entry clear and equal bilaterally  Breast: left breast flap is pink, no congestion, brisk capillary refill, good doppler signal, no venous congestion - Vioptic readin%, all incisions are clean/ dry/ intact.   abd: soft, non-distended, mild tenderness to palpation along lower abdominal incision - JADE drains in place with serosanguinous output, umbilicus with good color - all incisions are clean/ dry/ intact.   extr: no swelling in the extremities, decreased ROM in the left upper extremity secondary to pain, no decreased sensation, no calf tenderness.    Urine output: 650  Breast drain1: 110; Breast drain 2: 70  abdomen drain right:  20; abdomen drain left: 100    -A/P: 46 yo female POD 1 from left mastectomy with left axillary dissection and SE flap breast reconstruction doing well, flap healthy  -per plastic surgery - continue flap checks q 1 hour, no caffeine  -out of bed to chair  -incentive spirometry  -start DVT ppx today  -pain control  -Is/Os

## 2017-09-29 NOTE — PROGRESS NOTE ADULT - SUBJECTIVE AND OBJECTIVE BOX
Chief Complaint: left chest wall pain    HPI:   47y Female s/p left mastectomy, with flap/reconstruction/ sentinel node biopsy, pod#1.  Pt complaining of left chest wall pain which worsens on exertion.  Pt lying in bed, nad.  No nausea or vomiting.  No chest pain or sob.        PAIN SCORE:  5/10       SCALE USED: (1-10 VNRS)    Allergies    No Known Allergies    Intolerances      MEDICATIONS  (STANDING):  sodium chloride 0.9% lock flush 3 milliLiter(s) IV Push every 8 hours  ketorolac   Injectable 30 milliGRAM(s) IV Push every 6 hours  enoxaparin Injectable 40 milliGRAM(s) SubCutaneous daily    MEDICATIONS  (PRN):  HYDROmorphone  Injectable 0.5 milliGRAM(s) IV Push every 10 minutes PRN Severe Pain (7 - 10)  ondansetron Injectable 4 milliGRAM(s) IV Push once PRN Nausea and/or Vomiting  naloxone Injectable 0.1 milliGRAM(s) IV Push every 3 minutes PRN For ANY of the following changes in patient status:  A. RR LESS THAN 10 breaths per minute, B. Oxygen saturation LESS THAN 90%, C. Sedation score of 6  ondansetron Injectable 4 milliGRAM(s) IV Push every 6 hours PRN Nausea  ondansetron Injectable 4 milliGRAM(s) IV Push every 6 hours PRN Nausea  morphine  - Injectable 2 milliGRAM(s) IV Push every 4 hours PRN breakthrough pain      PHYSICAL EXAM:    Vital Signs Last 24 Hrs  T(C): 36.4 (29 Sep 2017 08:00), Max: 36.4 (28 Sep 2017 20:00)  T(F): 97.6 (29 Sep 2017 08:00), Max: 97.6 (29 Sep 2017 08:00)  HR: 83 (29 Sep 2017 12:00) (75 - 105)  BP: 101/55 (29 Sep 2017 12:00) (93/50 - 128/58)  BP(mean): 66 (29 Sep 2017 12:00) (60 - 83)  RR: 18 (29 Sep 2017 12:00) (13 - 23)  SpO2: 100% (29 Sep 2017 12:00) (99% - 100%)             LABS:                          11.5   12.7  )-----------( 178      ( 29 Sep 2017 03:52 )             34.0     09-29    141  |  107  |  6<L>  ----------------------------<  152<H>  4.4   |  20<L>  |  0.97    Ca    7.9<L>      29 Sep 2017 03:52  Phos  2.9     09-29  Mg     1.5     09-29    TPro  6.1  /  Alb  2.6<L>  /  TBili  0.6  /  DBili  x   /  AST  31  /  ALT  21  /  AlkPhos  47  09-29          Drug Screen:        RADIOLOGY:

## 2017-09-29 NOTE — PHYSICAL THERAPY INITIAL EVALUATION ADULT - DID THE PATIENT HAVE SURGERY?
yes Axillary lymph node dissection on left; Left mastectomy with axillary lymph node dissection; SE flap, free  09/28/2017  right SE flap to left breast for breast reconstruction after mastectomy/yes

## 2017-09-29 NOTE — PHYSICAL THERAPY INITIAL EVALUATION ADULT - GENERAL OBSERVATIONS, REHAB EVAL
awake, alert, NAD; currently on O2@3L/mandi via NC; 1 JADE drain on each groain area; 2 JADE drain on left chest incision site

## 2017-09-30 LAB
ALBUMIN SERPL ELPH-MCNC: 2.1 G/DL — LOW (ref 3.5–5)
ALP SERPL-CCNC: 43 U/L — SIGNIFICANT CHANGE UP (ref 40–120)
ALT FLD-CCNC: 19 U/L DA — SIGNIFICANT CHANGE UP (ref 10–60)
ANION GAP SERPL CALC-SCNC: 5 MMOL/L — SIGNIFICANT CHANGE UP (ref 5–17)
ANION GAP SERPL CALC-SCNC: 6 MMOL/L — SIGNIFICANT CHANGE UP (ref 5–17)
ANION GAP SERPL CALC-SCNC: 6 MMOL/L — SIGNIFICANT CHANGE UP (ref 5–17)
APTT BLD: 31.2 SEC — SIGNIFICANT CHANGE UP (ref 27.5–37.4)
AST SERPL-CCNC: 29 U/L — SIGNIFICANT CHANGE UP (ref 10–40)
BASOPHILS # BLD AUTO: 0.1 K/UL — SIGNIFICANT CHANGE UP (ref 0–0.2)
BASOPHILS NFR BLD AUTO: 0.6 % — SIGNIFICANT CHANGE UP (ref 0–2)
BILIRUB SERPL-MCNC: 0.4 MG/DL — SIGNIFICANT CHANGE UP (ref 0.2–1.2)
BUN SERPL-MCNC: 11 MG/DL — SIGNIFICANT CHANGE UP (ref 7–18)
BUN SERPL-MCNC: 8 MG/DL — SIGNIFICANT CHANGE UP (ref 7–18)
BUN SERPL-MCNC: 9 MG/DL — SIGNIFICANT CHANGE UP (ref 7–18)
CALCIUM SERPL-MCNC: 7.7 MG/DL — LOW (ref 8.4–10.5)
CALCIUM SERPL-MCNC: 8 MG/DL — LOW (ref 8.4–10.5)
CALCIUM SERPL-MCNC: 8 MG/DL — LOW (ref 8.4–10.5)
CHLORIDE SERPL-SCNC: 110 MMOL/L — HIGH (ref 96–108)
CO2 SERPL-SCNC: 26 MMOL/L — SIGNIFICANT CHANGE UP (ref 22–31)
CO2 SERPL-SCNC: 26 MMOL/L — SIGNIFICANT CHANGE UP (ref 22–31)
CO2 SERPL-SCNC: 27 MMOL/L — SIGNIFICANT CHANGE UP (ref 22–31)
CREAT SERPL-MCNC: 0.58 MG/DL — SIGNIFICANT CHANGE UP (ref 0.5–1.3)
CREAT SERPL-MCNC: 0.59 MG/DL — SIGNIFICANT CHANGE UP (ref 0.5–1.3)
CREAT SERPL-MCNC: 0.6 MG/DL — SIGNIFICANT CHANGE UP (ref 0.5–1.3)
EOSINOPHIL # BLD AUTO: 0 K/UL — SIGNIFICANT CHANGE UP (ref 0–0.5)
EOSINOPHIL NFR BLD AUTO: 0 % — SIGNIFICANT CHANGE UP (ref 0–6)
GLUCOSE SERPL-MCNC: 114 MG/DL — HIGH (ref 70–99)
GLUCOSE SERPL-MCNC: 115 MG/DL — HIGH (ref 70–99)
GLUCOSE SERPL-MCNC: 118 MG/DL — HIGH (ref 70–99)
HCT VFR BLD CALC: 24.9 % — LOW (ref 34.5–45)
HCT VFR BLD CALC: 25.2 % — LOW (ref 34.5–45)
HCT VFR BLD CALC: 28.3 % — LOW (ref 34.5–45)
HGB BLD-MCNC: 8.3 G/DL — LOW (ref 11.5–15.5)
HGB BLD-MCNC: 8.4 G/DL — LOW (ref 11.5–15.5)
HGB BLD-MCNC: 9.5 G/DL — LOW (ref 11.5–15.5)
INR BLD: 1.17 RATIO — HIGH (ref 0.88–1.16)
LYMPHOCYTES # BLD AUTO: 1 K/UL — SIGNIFICANT CHANGE UP (ref 1–3.3)
LYMPHOCYTES # BLD AUTO: 8.7 % — LOW (ref 13–44)
MAGNESIUM SERPL-MCNC: 1.8 MG/DL — SIGNIFICANT CHANGE UP (ref 1.6–2.6)
MAGNESIUM SERPL-MCNC: 2 MG/DL — SIGNIFICANT CHANGE UP (ref 1.6–2.6)
MCHC RBC-ENTMCNC: 30.1 PG — SIGNIFICANT CHANGE UP (ref 27–34)
MCHC RBC-ENTMCNC: 30.3 PG — SIGNIFICANT CHANGE UP (ref 27–34)
MCHC RBC-ENTMCNC: 30.6 PG — SIGNIFICANT CHANGE UP (ref 27–34)
MCHC RBC-ENTMCNC: 33.2 GM/DL — SIGNIFICANT CHANGE UP (ref 32–36)
MCHC RBC-ENTMCNC: 33.3 GM/DL — SIGNIFICANT CHANGE UP (ref 32–36)
MCHC RBC-ENTMCNC: 33.5 GM/DL — SIGNIFICANT CHANGE UP (ref 32–36)
MCV RBC AUTO: 90.4 FL — SIGNIFICANT CHANGE UP (ref 80–100)
MCV RBC AUTO: 91.1 FL — SIGNIFICANT CHANGE UP (ref 80–100)
MCV RBC AUTO: 91.3 FL — SIGNIFICANT CHANGE UP (ref 80–100)
MONOCYTES # BLD AUTO: 0.4 K/UL — SIGNIFICANT CHANGE UP (ref 0–0.9)
MONOCYTES NFR BLD AUTO: 3.7 % — SIGNIFICANT CHANGE UP (ref 2–14)
NEUTROPHILS # BLD AUTO: 9.9 K/UL — HIGH (ref 1.8–7.4)
NEUTROPHILS NFR BLD AUTO: 87 % — HIGH (ref 43–77)
PHOSPHATE SERPL-MCNC: 1.2 MG/DL — LOW (ref 2.5–4.5)
PHOSPHATE SERPL-MCNC: 1.8 MG/DL — LOW (ref 2.5–4.5)
PHOSPHATE SERPL-MCNC: 2.1 MG/DL — LOW (ref 2.5–4.5)
PLATELET # BLD AUTO: 102 K/UL — LOW (ref 150–400)
PLATELET # BLD AUTO: 177 K/UL — SIGNIFICANT CHANGE UP (ref 150–400)
PLATELET # BLD AUTO: 181 K/UL — SIGNIFICANT CHANGE UP (ref 150–400)
POTASSIUM SERPL-MCNC: 3.8 MMOL/L — SIGNIFICANT CHANGE UP (ref 3.5–5.3)
POTASSIUM SERPL-MCNC: 3.8 MMOL/L — SIGNIFICANT CHANGE UP (ref 3.5–5.3)
POTASSIUM SERPL-MCNC: 4.2 MMOL/L — SIGNIFICANT CHANGE UP (ref 3.5–5.3)
POTASSIUM SERPL-SCNC: 3.8 MMOL/L — SIGNIFICANT CHANGE UP (ref 3.5–5.3)
POTASSIUM SERPL-SCNC: 3.8 MMOL/L — SIGNIFICANT CHANGE UP (ref 3.5–5.3)
POTASSIUM SERPL-SCNC: 4.2 MMOL/L — SIGNIFICANT CHANGE UP (ref 3.5–5.3)
PROT SERPL-MCNC: 6.1 G/DL — SIGNIFICANT CHANGE UP (ref 6–8.3)
PROTHROM AB SERPL-ACNC: 12.8 SEC — HIGH (ref 9.8–12.7)
RBC # BLD: 2.75 M/UL — LOW (ref 3.8–5.2)
RBC # BLD: 2.77 M/UL — LOW (ref 3.8–5.2)
RBC # BLD: 3.1 M/UL — LOW (ref 3.8–5.2)
RBC # FLD: 13.7 % — SIGNIFICANT CHANGE UP (ref 10.3–14.5)
RBC # FLD: 14 % — SIGNIFICANT CHANGE UP (ref 10.3–14.5)
RBC # FLD: 14.2 % — SIGNIFICANT CHANGE UP (ref 10.3–14.5)
SODIUM SERPL-SCNC: 142 MMOL/L — SIGNIFICANT CHANGE UP (ref 135–145)
WBC # BLD: 11.1 K/UL — HIGH (ref 3.8–10.5)
WBC # BLD: 11.3 K/UL — HIGH (ref 3.8–10.5)
WBC # BLD: 11.5 K/UL — HIGH (ref 3.8–10.5)
WBC # FLD AUTO: 11.1 K/UL — HIGH (ref 3.8–10.5)
WBC # FLD AUTO: 11.3 K/UL — HIGH (ref 3.8–10.5)
WBC # FLD AUTO: 11.5 K/UL — HIGH (ref 3.8–10.5)

## 2017-09-30 PROCEDURE — 35761: CPT | Mod: 62

## 2017-09-30 PROCEDURE — 10140 I&D HMTMA SEROMA/FLUID COLLJ: CPT | Mod: 62

## 2017-09-30 PROCEDURE — 99232 SBSQ HOSP IP/OBS MODERATE 35: CPT | Mod: GC

## 2017-09-30 PROCEDURE — 10140 I&D HMTMA SEROMA/FLUID COLLJ: CPT

## 2017-09-30 RX ORDER — KETOROLAC TROMETHAMINE 30 MG/ML
30 SYRINGE (ML) INJECTION EVERY 6 HOURS
Qty: 0 | Refills: 0 | Status: DISCONTINUED | OUTPATIENT
Start: 2017-09-30 | End: 2017-10-01

## 2017-09-30 RX ORDER — ERGOCALCIFEROL 1.25 MG/1
50000 CAPSULE ORAL
Qty: 0 | Refills: 0 | Status: DISCONTINUED | OUTPATIENT
Start: 2017-09-30 | End: 2017-09-30

## 2017-09-30 RX ORDER — ACETAMINOPHEN 500 MG
1000 TABLET ORAL ONCE
Qty: 0 | Refills: 0 | Status: COMPLETED | OUTPATIENT
Start: 2017-09-30 | End: 2017-09-30

## 2017-09-30 RX ORDER — SODIUM CHLORIDE 9 MG/ML
1000 INJECTION, SOLUTION INTRAVENOUS
Qty: 0 | Refills: 0 | Status: DISCONTINUED | OUTPATIENT
Start: 2017-09-30 | End: 2017-10-01

## 2017-09-30 RX ORDER — POTASSIUM PHOSPHATE, MONOBASIC POTASSIUM PHOSPHATE, DIBASIC 236; 224 MG/ML; MG/ML
15 INJECTION, SOLUTION INTRAVENOUS ONCE
Qty: 0 | Refills: 0 | Status: COMPLETED | OUTPATIENT
Start: 2017-09-30 | End: 2017-09-30

## 2017-09-30 RX ADMIN — MORPHINE SULFATE 2 MILLIGRAM(S): 50 CAPSULE, EXTENDED RELEASE ORAL at 22:33

## 2017-09-30 RX ADMIN — Medication 30 MILLIGRAM(S): at 12:29

## 2017-09-30 RX ADMIN — SODIUM CHLORIDE 3 MILLILITER(S): 9 INJECTION INTRAMUSCULAR; INTRAVENOUS; SUBCUTANEOUS at 06:24

## 2017-09-30 RX ADMIN — HYDROMORPHONE HYDROCHLORIDE 0.5 MILLIGRAM(S): 2 INJECTION INTRAMUSCULAR; INTRAVENOUS; SUBCUTANEOUS at 06:59

## 2017-09-30 RX ADMIN — Medication 1 APPLICATION(S): at 16:24

## 2017-09-30 RX ADMIN — Medication 400 MILLIGRAM(S): at 07:18

## 2017-09-30 RX ADMIN — Medication 30 MILLIGRAM(S): at 06:54

## 2017-09-30 RX ADMIN — POTASSIUM PHOSPHATE, MONOBASIC POTASSIUM PHOSPHATE, DIBASIC 62.5 MILLIMOLE(S): 236; 224 INJECTION, SOLUTION INTRAVENOUS at 18:14

## 2017-09-30 RX ADMIN — SODIUM CHLORIDE 3 MILLILITER(S): 9 INJECTION INTRAMUSCULAR; INTRAVENOUS; SUBCUTANEOUS at 21:06

## 2017-09-30 RX ADMIN — Medication 30 MILLIGRAM(S): at 18:13

## 2017-09-30 RX ADMIN — Medication 30 MILLIGRAM(S): at 12:24

## 2017-09-30 RX ADMIN — Medication 30 MILLIGRAM(S): at 18:31

## 2017-09-30 RX ADMIN — Medication 1000 MILLIGRAM(S): at 07:19

## 2017-09-30 RX ADMIN — ERGOCALCIFEROL 50000 UNIT(S): 1.25 CAPSULE ORAL at 06:26

## 2017-09-30 RX ADMIN — MORPHINE SULFATE 2 MILLIGRAM(S): 50 CAPSULE, EXTENDED RELEASE ORAL at 23:00

## 2017-09-30 RX ADMIN — Medication 30 MILLIGRAM(S): at 06:26

## 2017-09-30 RX ADMIN — SODIUM CHLORIDE 3 MILLILITER(S): 9 INJECTION INTRAMUSCULAR; INTRAVENOUS; SUBCUTANEOUS at 12:24

## 2017-09-30 RX ADMIN — HYDROMORPHONE HYDROCHLORIDE 0.5 MILLIGRAM(S): 2 INJECTION INTRAMUSCULAR; INTRAVENOUS; SUBCUTANEOUS at 07:19

## 2017-09-30 RX ADMIN — ENOXAPARIN SODIUM 40 MILLIGRAM(S): 100 INJECTION SUBCUTANEOUS at 12:24

## 2017-09-30 RX ADMIN — Medication 1 APPLICATION(S): at 22:09

## 2017-09-30 NOTE — BRIEF OPERATIVE NOTE - PRE-OP
<<-----Click on this checkbox to enter Pre-Op Dx

## 2017-09-30 NOTE — PROGRESS NOTE ADULT - ATTENDING COMMENTS
48 yo F POD 2 s/p Left breast mastectomy with axillary LN dissection and SE flap reconstruction. The patient had swelling noted and was taken to the OR for exploration with un-drained seroma found. Additional drain placed. No signs of bleeding or infection.  Anemia post op    The patient is hemodynamically stable. There is no evidence of bleeding. Continue to monitor flap.  Follow surgery recommendations regarding nursing monitoring protocol  DVT prophylaxis 48 yo F POD 2 s/p Left breast mastectomy with axillary LN dissection and SE flap reconstruction. The patient had swelling noted and was taken to the OR for exploration with un-drained seroma found. Additional drain placed. No signs of bleeding or infection.  Anemia post op    The patient is hemodynamically stable. There is no evidence of bleeding. Continue to monitor flap.  Follow surgery recommendations regarding nursing monitoring protocol  Small Hgb drop, no signs of bleeding. Monitor clinically. Possible post op equilibration   DVT prophylaxis

## 2017-09-30 NOTE — BRIEF OPERATIVE NOTE - SPECIMENS
None
left breast tissue, left sentinel node, left axillary contents
left internal mammary lymph nodes

## 2017-09-30 NOTE — BRIEF OPERATIVE NOTE - PROCEDURE
<<-----Click on this checkbox to enter Procedure Incision and drainage of hematoma or seroma  09/30/2017    Active  CFUNFGELD  Exploration of breast  09/30/2017    Active  CFUNFGELD

## 2017-09-30 NOTE — BRIEF OPERATIVE NOTE - PROCEDURE POST
<<-----Click on this checkbox to enter Post-Op Dx

## 2017-09-30 NOTE — PROGRESS NOTE ADULT - SUBJECTIVE AND OBJECTIVE BOX
INTERVAL HPI/OVERNIGHT EVENTS: No overnight events       Hematalogic:  enoxaparin Injectable 40 milliGRAM(s) SubCutaneous daily    Other:  sodium chloride 0.9% lock flush 3 milliLiter(s) IV Push every 8 hours  HYDROmorphone  Injectable 0.5 milliGRAM(s) IV Push every 10 minutes PRN  ondansetron Injectable 4 milliGRAM(s) IV Push once PRN  naloxone Injectable 0.1 milliGRAM(s) IV Push every 3 minutes PRN  ondansetron Injectable 4 milliGRAM(s) IV Push every 6 hours PRN  ketorolac   Injectable 30 milliGRAM(s) IV Push every 6 hours  ondansetron Injectable 4 milliGRAM(s) IV Push every 6 hours PRN  morphine  - Injectable 2 milliGRAM(s) IV Push every 4 hours PRN    sodium chloride 0.9% lock flush 3 milliLiter(s) IV Push every 8 hours  HYDROmorphone  Injectable 0.5 milliGRAM(s) IV Push every 10 minutes PRN  ondansetron Injectable 4 milliGRAM(s) IV Push once PRN  naloxone Injectable 0.1 milliGRAM(s) IV Push every 3 minutes PRN  ondansetron Injectable 4 milliGRAM(s) IV Push every 6 hours PRN  ketorolac   Injectable 30 milliGRAM(s) IV Push every 6 hours  ondansetron Injectable 4 milliGRAM(s) IV Push every 6 hours PRN  morphine  - Injectable 2 milliGRAM(s) IV Push every 4 hours PRN  enoxaparin Injectable 40 milliGRAM(s) SubCutaneous daily    Drug Dosing Weight  Height (cm): 157.48 (28 Sep 2017 09:18)  Weight (kg): 71.9 (28 Sep 2017 20:00)  BMI (kg/m2): 29 (28 Sep 2017 20:00)  BSA (m2): 1.73 (28 Sep 2017 20:00)    CENTRAL LINE: [ ] YES [x ] NO  LOCATION:   DATE INSERTED:  REMOVE: [ ] YES [ ] NO  EXPLAIN:    PICKARD: [x ] YES [ ] NO    DATE INSERTED:  REMOVE:  [ ] YES [ ] NO  EXPLAIN:    A-LINE:  [ ] YES [x ] NO  LOCATION:   DATE INSERTED:  REMOVE:  [ ] YES [ ] NO  EXPLAIN:    PMH -reviewed admission note, no change since admission  Heart faliure: acute [ ] chronic [ ] acute or chronic [ ] diastolic [ ] systolic [ ] combied systolic and diastolic[ ]  ADAM: ATN[ ] renal medullary necrosis [ ] CKD I [ ]CKDII [ ]CKD III [ ]CKD IV [ ]CKD V [ ]Other pathological lesions [ ]  Abdominal Nutrition Status: malnutrition [ ] cachexia [ ] morbid obesity/BMI=40 [ ] Supplement ordered [___________]     ICU Vital Signs Last 24 Hrs  T(C): 36.9 (29 Sep 2017 23:00), Max: 37.4 (29 Sep 2017 19:10)  T(F): 98.5 (29 Sep 2017 23:00), Max: 99.3 (29 Sep 2017 19:10)  HR: 89 (30 Sep 2017 02:00) (79 - 96)  BP: 90/51 (30 Sep 2017 02:00) (90/51 - 123/73)  BP(mean): 60 (30 Sep 2017 02:00) (60 - 71)  ABP: 124/58 (29 Sep 2017 12:00) (106/52 - 138/74)  ABP(mean): 81 (29 Sep 2017 12:00) (70 - 97)  RR: 15 (30 Sep 2017 02:00) (13 - 21)  SpO2: 100% (30 Sep 2017 02:00) (100% - 100%)            09-28 @ 07:01  -  09-29 @ 07:00  --------------------------------------------------------  IN: 1600 mL / OUT: 950 mL / NET: 650 mL            PHYSICAL EXAM:    GENERAL: [x ]NAD, [x ]well-groomed, [ ]well-developed  HEAD:  [ x]Atraumatic, [x ]Normocephalic  EYES: [x ]EOMI, [ x]PERRLA, [ ]conjunctiva and sclera clear  ENMT: [x ]No tonsillar erythema, exudates, or enlargement; [ ]Moist mucous membranes, [ ]Good dentition, [ ]No lesions  NECK: [x ]Supple, normal appearance, [x ]No JVD; [x ]Normal thyroid; [x ]Trachea midline  NERVOUS SYSTEM:  [ x]Alert & Oriented X3, [ ]Good concentration; [ ]Motor Strength 5/5 B/L upper and lower extremities; [ ]DTRs 2+ intact and symmetric  CHEST/LUNG: [ ]No chest deformity; [ ]Normal percussion bilaterally; [x ]No rales, rhonchi, wheezing [x] JADE drains in place  HEART: [ x]Regular rate and rhythm; [ ]No murmurs, rubs, or gallops  ABDOMEN: [x ]Soft, Nontender, Nondistended; [ ]Bowel sounds present  EXTREMITIES:  [x ]2+ Peripheral Pulses, [ ]No clubbing, cyanosis, or edema  LYMPH: [ ]No lymphadenopathy noted  SKIN: [x ]No rashes or lesions; [x ]Good capillary refill      LABS:  CBC Full  -  ( 29 Sep 2017 03:52 )  WBC Count : 12.7 K/uL  Hemoglobin : 11.5 g/dL  Hematocrit : 34.0 %  Platelet Count - Automated : 178 K/uL  Mean Cell Volume : 88.6 fl  Mean Cell Hemoglobin : 30.1 pg  Mean Cell Hemoglobin Concentration : 34.0 gm/dL  Auto Neutrophil # : 10.4 K/uL  Auto Lymphocyte # : 1.3 K/uL  Auto Monocyte # : 0.9 K/uL  Auto Eosinophil # : 0.0 K/uL  Auto Basophil # : 0.1 K/uL  Auto Neutrophil % : 81.8 %  Auto Lymphocyte % : 10.4 %  Auto Monocyte % : 7.1 %  Auto Eosinophil % : 0.0 %  Auto Basophil % : 0.8 %    09-29    141  |  107  |  6<L>  ----------------------------<  152<H>  4.4   |  20<L>  |  0.97    Ca    7.9<L>      29 Sep 2017 03:52  Phos  2.9     09-29  Mg     1.5     09-29    TPro  6.1  /  Alb  2.6<L>  /  TBili  0.6  /  DBili  x   /  AST  31  /  ALT  21  /  AlkPhos  47  09-29            RADIOLOGY & ADDITIONAL STUDIES REVIEWED:  ***    [ ]GOALS OF CARE DISCUSSION WITH PATIENT/FAMILY/PROXY:    CRITICAL CARE TIME SPENT: 35 minutes

## 2017-09-30 NOTE — BRIEF OPERATIVE NOTE - OPERATION/FINDINGS
dopplerable signal on the flap, good capillary refill at the end of the case.
Left Breast seroma in subcutaneous tissues
positive left axillary sentinel lymph node, so completion axillary dissection performed

## 2017-09-30 NOTE — PROGRESS NOTE ADULT - SUBJECTIVE AND OBJECTIVE BOX
No significant events overnight.  On exam this morning, with swelling of left chest and tenderness  Vioptix 50% (up), doppler signal present and brisk cap refill    AVSS    Breast - left + swelling, flap is soft, warm, pink, no congestion, VIOPTIX 50%, + doppler, Shirley serorang  Abd - incision cdi, shirley serorang

## 2017-09-30 NOTE — CHART NOTE - NSCHARTNOTEFT_GEN_A_CORE
Patient has normal skin color, no swelling, good doppler flow study hourly. Patient was saturating in 37 % Patient has normal skin color, no swelling, good doppler flow study hourly. Vioptix saturation was 34% at around 12PM.  On call [facility] officer Dr Manning and Dr. Acevedo (plastic surgeon) was informed.   Repeated cbc, bmp and cbc was trending from  9.5 to 8.4 btu skin flap Vioptix saturation was 38%.  noneed for transfusion at this point.  Dr Acevedo was informed.

## 2017-09-30 NOTE — PROGRESS NOTE ADULT - PROBLEM SELECTOR PLAN 1
- morphine 2mg ivp q 4 hours prn  - toradol 30mg ivp q 6 hours  - stool softeners  - oob /pt
S/P Evacuation of Seroma.    Cont flap check  Cont analgesia  DVT prophylaxis  NC O2  Postop care
Pt doing well  S/P Left Mastectomy & SE Flap.  Left Breast dressing clean  Bioptic 50%  +Doppler SE arterial bruit  Postop care.
s/p Axillary lymph node dissection on left  and Left mastectomy with axillary lymph node dissection. Plastics were called a abdominal flap was used for resconstruciton of the breast  -monitor output from JADE drains   - monitor perfusion via the Vioptix ( keep over 40-44; call surgery for decrease to 30 or below)  - check doppler every hours over the surgical site  - monitor for the skin color   - monitor BP; may give iv fluids but no pressors to prevent vasopressive action and necrosis of the site.   - pain management: toradol and morphine; may start PCA pump if needed   - will given iv tylenol 1g for 4 doses  - Billy Ward.

## 2017-09-30 NOTE — PROGRESS NOTE ADULT - SUBJECTIVE AND OBJECTIVE BOX
s/p exploration left breast earlier today  Pain is improved      AVSS  Hemoglobin 8.4    Breast - flap is soft, warm, pink, no congestion, + dopplers arterial and venous.  Vioptix 39-40%  SHIRLEY serosan  Abd- incision cdi, shirley serosang

## 2017-09-30 NOTE — PROGRESS NOTE ADULT - SUBJECTIVE AND OBJECTIVE BOX
POSTOP NOTE    Patient is doing okay.        Vital Signs Last 24 Hrs  T(C): 37.8 (30 Sep 2017 19:45), Max: 38.1 (30 Sep 2017 05:00)  T(F): 100 (30 Sep 2017 19:45), Max: 100.5 (30 Sep 2017 05:00)  HR: 87 (30 Sep 2017 23:00) (86 - 128)  BP: 121/62 (30 Sep 2017 23:00) (90/51 - 144/64)  BP(mean): 71 (30 Sep 2017 23:00) (60 - 86)  RR: 19 (30 Sep 2017 23:00) (12 - 23)  SpO2: 100% (30 Sep 2017 23:00) (92% - 100%)    Daily     Daily Weight in k (30 Sep 2017 07:00)    I&O's Detail    29 Sep 2017 07:01  -  30 Sep 2017 07:00  --------------------------------------------------------  IN:    Oral Fluid: 150 mL    sodium chloride 0.9%: 500 mL    Solution: 100 mL    Solution: 75 mL  Total IN: 825 mL    OUT:    Bulb: 70 mL    Bulb: 65 mL    Bulb: 120 mL    Bulb: 125 mL    Indwelling Catheter - Urethral: 700 mL    Voided: 350 mL  Total OUT: 1430 mL    Total NET: -605 mL      30 Sep 2017 07:01  -  30 Sep 2017 23:38  --------------------------------------------------------  IN:    lactated ringers.: 825 mL    Solution: 400 mL  Total IN: 1225 mL    OUT:    Bulb: 15 mL    Bulb: 25 mL    Bulb: 15 mL    Bulb: 20 mL    Bulb: 150 mL    Indwelling Catheter - Urethral: 1090 mL    Voided: 300 mL  Total OUT: 1615 mL    Total NET: -390 mL          MEDICATIONS  (STANDING):  sodium chloride 0.9% lock flush 3 milliLiter(s) IV Push every 8 hours  enoxaparin Injectable 40 milliGRAM(s) SubCutaneous daily  lactated ringers. 1000 milliLiter(s) (75 mL/Hr) IV Continuous <Continuous>  ketorolac   Injectable 30 milliGRAM(s) IV Push every 6 hours  silver sulfADIAZINE 1% Cream 1 Application(s) Topical three times a day    MEDICATIONS  (PRN):  naloxone Injectable 0.1 milliGRAM(s) IV Push every 3 minutes PRN For ANY of the following changes in patient status:  A. RR LESS THAN 10 breaths per minute, B. Oxygen saturation LESS THAN 90%, C. Sedation score of 6  ondansetron Injectable 4 milliGRAM(s) IV Push every 6 hours PRN Nausea  morphine  - Injectable 2 milliGRAM(s) IV Push every 4 hours PRN breakthrough pain      PHYSICAL EXAM:  GENERAL: In no acute distress  CHEST/LUNG: Clear to percussion bilaterally; Normal respiratory effort  HEART: Regular rate and rhythm    Breast - flap is soft, warm, pink, no congestion  + dopplers arterial and venous.  Vioptix 40%  SHIRLEY serosanguinous  Abd- incision cdi, shirley serosang    ABDOMEN: Soft, Nontender, Nondistended; Bowel sounds present  EXTREMITIES:  No clubbing, cyanosis, or edema    LABS:                        8.3    11.5  )-----------( 177      ( 30 Sep 2017 22:47 )             24.9     Neutrophil %:       142  |  110<H>  |  9   ----------------------------<  118<H>  4.2   |  27  |  0.59    Ca    8.0<L>      30 Sep 2017 22:47  Phos  2.1       Mg     1.8         TPro  6.1  /  Alb  2.1<L>  /  TBili  0.4  /  DBili  x   /  AST  29  /  ALT  19  /  AlkPhos  43      PT/INR - ( 30 Sep 2017 08:16 )   PT: 12.8 sec;   INR: 1.17 ratio         PTT - ( 30 Sep 2017 08:16 )  PTT:31.2 sec      RADIOLOGY & ADDITIONAL STUDIES:

## 2017-09-30 NOTE — BRIEF OPERATIVE NOTE - PRE-OP DX
Malignant neoplasm of upper-outer quadrant of left breast in female, estrogen receptor positive  09/28/2017    Active  Kristal Denson
Malignant neoplasm of upper-outer quadrant of left breast in female, estrogen receptor positive  09/28/2017    Active  Kristal Denson
Other transplanted tissue failure  09/30/2017    Active  Jonh Arce

## 2017-09-30 NOTE — BRIEF OPERATIVE NOTE - POST-OP DX
Malignant neoplasm of upper-outer quadrant of left breast in female, estrogen receptor positive  09/28/2017  with positive sentinel lymph node  Active  Kristal Denson
Malignant neoplasm of upper-outer quadrant of left breast in female, estrogen receptor positive  09/28/2017  with positive sentinel lymph node  Active  Kristal Denson
Seroma of breast  09/30/2017    Active  Jonh Arce

## 2017-10-01 LAB
ANION GAP SERPL CALC-SCNC: 5 MMOL/L — SIGNIFICANT CHANGE UP (ref 5–17)
BUN SERPL-MCNC: 10 MG/DL — SIGNIFICANT CHANGE UP (ref 7–18)
CALCIUM SERPL-MCNC: 8 MG/DL — LOW (ref 8.4–10.5)
CHLORIDE SERPL-SCNC: 110 MMOL/L — HIGH (ref 96–108)
CO2 SERPL-SCNC: 26 MMOL/L — SIGNIFICANT CHANGE UP (ref 22–31)
CREAT SERPL-MCNC: 0.57 MG/DL — SIGNIFICANT CHANGE UP (ref 0.5–1.3)
GLUCOSE SERPL-MCNC: 105 MG/DL — HIGH (ref 70–99)
HCT VFR BLD CALC: 24.6 % — LOW (ref 34.5–45)
HGB BLD-MCNC: 7.5 G/DL — LOW (ref 11.5–15.5)
MAGNESIUM SERPL-MCNC: 2.1 MG/DL — SIGNIFICANT CHANGE UP (ref 1.6–2.6)
MCHC RBC-ENTMCNC: 27.7 PG — SIGNIFICANT CHANGE UP (ref 27–34)
MCHC RBC-ENTMCNC: 30.4 GM/DL — LOW (ref 32–36)
MCV RBC AUTO: 91 FL — SIGNIFICANT CHANGE UP (ref 80–100)
PHOSPHATE SERPL-MCNC: 2.2 MG/DL — LOW (ref 2.5–4.5)
PLATELET # BLD AUTO: 169 K/UL — SIGNIFICANT CHANGE UP (ref 150–400)
POTASSIUM SERPL-MCNC: 4.3 MMOL/L — SIGNIFICANT CHANGE UP (ref 3.5–5.3)
POTASSIUM SERPL-SCNC: 4.3 MMOL/L — SIGNIFICANT CHANGE UP (ref 3.5–5.3)
RBC # BLD: 2.7 M/UL — LOW (ref 3.8–5.2)
RBC # FLD: 13.6 % — SIGNIFICANT CHANGE UP (ref 10.3–14.5)
SODIUM SERPL-SCNC: 141 MMOL/L — SIGNIFICANT CHANGE UP (ref 135–145)
WBC # BLD: 11.3 K/UL — HIGH (ref 3.8–10.5)
WBC # FLD AUTO: 11.3 K/UL — HIGH (ref 3.8–10.5)

## 2017-10-01 RX ORDER — LOSARTAN POTASSIUM 100 MG/1
50 TABLET, FILM COATED ORAL DAILY
Qty: 0 | Refills: 0 | Status: DISCONTINUED | OUTPATIENT
Start: 2017-10-01 | End: 2017-10-03

## 2017-10-01 RX ADMIN — Medication 30 MILLIGRAM(S): at 11:19

## 2017-10-01 RX ADMIN — Medication 1 APPLICATION(S): at 22:47

## 2017-10-01 RX ADMIN — SODIUM CHLORIDE 3 MILLILITER(S): 9 INJECTION INTRAMUSCULAR; INTRAVENOUS; SUBCUTANEOUS at 06:33

## 2017-10-01 RX ADMIN — Medication 30 MILLIGRAM(S): at 11:18

## 2017-10-01 RX ADMIN — SODIUM CHLORIDE 3 MILLILITER(S): 9 INJECTION INTRAMUSCULAR; INTRAVENOUS; SUBCUTANEOUS at 11:19

## 2017-10-01 RX ADMIN — MORPHINE SULFATE 2 MILLIGRAM(S): 50 CAPSULE, EXTENDED RELEASE ORAL at 07:01

## 2017-10-01 RX ADMIN — MORPHINE SULFATE 2 MILLIGRAM(S): 50 CAPSULE, EXTENDED RELEASE ORAL at 14:33

## 2017-10-01 RX ADMIN — ENOXAPARIN SODIUM 40 MILLIGRAM(S): 100 INJECTION SUBCUTANEOUS at 11:18

## 2017-10-01 RX ADMIN — SODIUM CHLORIDE 3 MILLILITER(S): 9 INJECTION INTRAMUSCULAR; INTRAVENOUS; SUBCUTANEOUS at 18:05

## 2017-10-01 RX ADMIN — MORPHINE SULFATE 2 MILLIGRAM(S): 50 CAPSULE, EXTENDED RELEASE ORAL at 06:34

## 2017-10-01 RX ADMIN — LOSARTAN POTASSIUM 50 MILLIGRAM(S): 100 TABLET, FILM COATED ORAL at 14:33

## 2017-10-01 RX ADMIN — Medication 1 APPLICATION(S): at 11:18

## 2017-10-01 RX ADMIN — Medication 1 APPLICATION(S): at 06:34

## 2017-10-01 NOTE — PROGRESS NOTE ADULT - SUBJECTIVE AND OBJECTIVE BOX
No significant events  Pain under control  No SOB or CP    AVSS  Breast - flap is soft, warm, no congestion, Vioptix 44%, + doppler, SHIRLEY serosang  Abd - incision cdi, shirley serosang      Hem-7.5

## 2017-10-01 NOTE — PROGRESS NOTE ADULT - SUBJECTIVE AND OBJECTIVE BOX
s/p L Mastectomy & SE reconstruction 9/28  s/p L Breast Exploration with evacuation of seroma 9/30      Patient examined at bedside, no complaints. States that she feels much better than yesterday  Denies breast pain  Reports mild incisional lower abdominal pain  No nausea, no vomiting  Tolerating diet  Vioptix at 43%      T(F): 99.1 (10-01-17 @ 00:00), Max: 100 (09-30-17 @ 19:45)  HR: 85 (10-01-17 @ 06:00) (77 - 128)  BP: 105/59 (10-01-17 @ 06:00) (103/54 - 144/64)  RR: 15 (10-01-17 @ 06:00) (12 - 23)  SpO2: 100% (10-01-17 @ 06:00) (92% - 100%)      OUT:    Bulb: 280 mL    Bulb: 65 mL    Bulb: 40 mL    Bulb: 45 mL    Bulb: 65 mL    Indwelling Catheter - Urethral: 1720 mL    Voided: 300 mL                            7.5    11.3  )-----------( 169      ( 01 Oct 2017 06:27 )             24.6     10-01    141  |  110<H>  |  10  ----------------------------<  105<H>  4.3   |  26  |  0.57    Ca    8.0<L>      01 Oct 2017 06:27  Phos  2.2     10-01  Mg     2.1     10-01    TPro  6.1  /  Alb  2.1<L>  /  TBili  0.4  /  DBili  x   /  AST  29  /  ALT  19  /  AlkPhos  43  09-30      Physical Exam  General: AAOx3, No acute distress  Skin: No jaundice, no icterus  Breast: Left breast is edematous, non tender to palpation, warm to touch, good capillary refill of flap. Strong dopplerable signal of flap. Vioptix at 43%. JADE with serous drainage.   Abdomen: soft, nontender, nondistended, no rebound tenderness, no guarding, no palpable masses, lower abdominal incision clean, skin glue over incision, no drainage.  Extremities: non edematous, no calf pain bilaterally

## 2017-10-01 NOTE — CHART NOTE - NSCHARTNOTEFT_GEN_A_CORE
47 year old female medical history of HTN and HLD, found to have a left sided breast carcinoma and was scheduled for left mastectomy today. She noticed tumour on the breast 4 months ago on a mammogram and presented with a 7/10 pain over the breast. 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 She had positive sentinel lymph node. Procedures done today. Axillary lymph node dissection on left  and Left mastectomy with axillary lymph node dissection. Plastics were called a abdominal flap was used for resconstruciton of the breast. 4 JADE drains were placed; 2 over the left breast and 2 both sides of the abdominal flap. Patient was given IV fluids but no intra-op antibiotics or blood. She was intubated for the procedure but was successfully extubated in the recovery room. She has a left radial line for monitoring and a Vioptex device for  perfusion monitoring of the surgical site. She was transferred to ICU for post-op monitoring. 47 year old female medical history of HTN and HLD, found to have a left sided breast carcinoma and was scheduled for left mastectomy today. She noticed tumour on the breast 4 months ago on a mammogram and presented with a 7/10 pain over the breast. 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 She had positive sentinel lymph node. Procedures done today. Axillary lymph node dissection on left  and Left mastectomy with axillary lymph node dissection. Plastics were called a abdominal flap was used for resconstruciton of the breast. 4 JADE drains were placed; 2 over the left breast and 2 both sides of the abdominal flap. Patient was given IV fluids but no intra-op antibiotics or blood. She was intubated for the procedure but was successfully extubated in the recovery room. She has a left radial line for monitoring and a Vioptex device for  perfusion monitoring of the surgical site. She was transferred to ICU for post-op monitoring.    Hospital course:   Problem: Malignant neoplasm.  Plan: s/p Axillary lymph node dissection on left  and Left mastectomy with axillary lymph node dissection. Plastics were called a abdominal flap was used for resconstruciton of the breast  -monitor output from JADE drains   - monitor perfusion via the Vioptix ( keep over 40-44; call surgery for decrease to 30 or below)  - last noted was 41%  - check doppler every hours over the surgical site  - monitor for the skin color   - monitor BP; may give iv fluids but no pressors to prevent vasopressive action and necrosis of the site.   - pain management: toradol and morphine; may start PCA pump if needed   - will given iv tylenol 1g for 4 doses  - repeat OR for seroma done yesterday   - Billy Ward.      Problem/Plan - 2:  ·  Problem: Hypertension.  Plan: BP stable.      Problem/Plan - 3:  ·  Problem: Prophylactic measure.  Plan: started on lovenox.     Patient is stable to be downgrade for ICU for further monitoring.  Discussed with Attending, 47 year old female medical history of HTN and HLD, found to have a left sided breast carcinoma and was scheduled for left mastectomy today. She noticed tumour on the breast 4 months ago on a mammogram and presented with a 7/10 pain over the breast. 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 She had positive sentinel lymph node. Procedures done today. Axillary lymph node dissection on left  and Left mastectomy with axillary lymph node dissection. Plastics were called a abdominal flap was used for resconstruciton of the breast. 4 JADE drains were placed; 2 over the left breast and 2 both sides of the abdominal flap. Patient was given IV fluids but no intra-op antibiotics or blood. She was intubated for the procedure but was successfully extubated in the recovery room. She has a left radial line for monitoring and a Vioptex device for  perfusion monitoring of the surgical site. She was transferred to ICU for post-op monitoring.    Hospital course:   Problem: Malignant neoplasm.  Plan: s/p Axillary lymph node dissection on left  and Left mastectomy with axillary lymph node dissection. Plastics were called a abdominal flap was used for resconstruciton of the breast  -monitor output from JADE drains   - monitor perfusion via the Vioptix ( keep over 40-44; call surgery for decrease to 30 or below)  - last noted was 41%  - check doppler every hours over the surgical site  - monitor for the skin color   - monitor BP; may give iv fluids but no pressors to prevent vasopressive action and necrosis of the site.   - pain management: toradol and morphine; may start PCA pump if needed   - will given iv tylenol 1g for 4 doses  - repeat OR for seroma done yesterday   - Hemoglobin is low, will monitor, Check CBC at 8:00 pm  - Billy Ward.      Problem/Plan - 2:  ·  Problem: Hypertension.  Plan: BP stable.      Problem/Plan - 3:  ·  Problem: Prophylactic measure.  Plan: started on lovenox.     Patient is stable to be downgrade for ICU for further monitoring.  Discussed with Attending, Surgery PA, 47 year old female medical history of HTN and HLD, found to have a left sided breast carcinoma and was scheduled for left mastectomy today. She noticed tumour on the breast 4 months ago on a mammogram and presented with a 7/10 pain over the breast. 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 She had positive sentinel lymph node. Procedures done today. Axillary lymph node dissection on left  and Left mastectomy with axillary lymph node dissection. Plastics were called a abdominal flap was used for resconstruciton of the breast. 4 JADE drains were placed; 2 over the left breast and 2 both sides of the abdominal flap. Patient was given IV fluids but no intra-op antibiotics or blood. She was intubated for the procedure but was successfully extubated in the recovery room. She has a left radial line for monitoring and a Vioptex device for  perfusion monitoring of the surgical site. She was transferred to ICU for post-op monitoring.    Hospital course:   Problem: Malignant neoplasm.  Plan: s/p Axillary lymph node dissection on left  and Left mastectomy with axillary lymph node dissection. Plastics were called a abdominal flap was used for resconstruciton of the breast  -monitor output from JADE drains   - monitor perfusion via the Vioptix ( keep over 40-44; call surgery for decrease to 30 or below)  - last noted was 41%  - check doppler every hours over the surgical site  - monitor for the skin color   - monitor BP; may give iv fluids but no pressors to prevent vasopressive action and necrosis of the site.   - pain management: toradol and morphine; may start PCA pump if needed   - will given iv tylenol 1g for 4 doses  - repeat OR for seroma done yesterday   - Hemoglobin is low, will monitor, Check CBC at 8:00 pm  - Billy Ward.      Problem/Plan - 2:  ·  Problem: Hypertension.  Plan: BP stable.      Problem/Plan - 3:  ·  Problem: Prophylactic measure.  Plan: started on lovenox.     Patient is stable to be downgrade for ICU for further monitoring.  Discussed with Attending, Surgery PADr Acevedo 47 year old female medical history of HTN and HLD, found to have a left sided breast carcinoma and was scheduled for left mastectomy today. She noticed tumour on the breast 4 months ago on a mammogram and presented with a 7/10 pain over the breast. 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 She had positive sentinel lymph node. Procedures done today. Axillary lymph node dissection on left  and Left mastectomy with axillary lymph node dissection. Plastics were called a abdominal flap was used for resconstruciton of the breast. 4 JADE drains were placed; 2 over the left breast and 2 both sides of the abdominal flap. Patient was given IV fluids but no intra-op antibiotics or blood. She was intubated for the procedure but was successfully extubated in the recovery room. She has a left radial line for monitoring and a Vioptex device for  perfusion monitoring of the surgical site. She was transferred to ICU for post-op monitoring.    Hospital course:   Problem: Malignant neoplasm.  Plan: s/p Axillary lymph node dissection on left  and Left mastectomy with axillary lymph node dissection. Plastics were called a abdominal flap was used for resconstruciton of the breast  -monitor output from JADE drains   - monitor perfusion via the Vioptix ( keep over 40-44; call surgery for decrease to 30 or below)  - last noted was 41%  - check doppler every hours over the surgical site  - monitor for the skin color   - monitor BP; may give iv fluids but no pressors to prevent vasopressive action and necrosis of the site.   - pain management: toradol and morphine; may start PCA pump if needed   - will given iv tylenol 1g for 4 doses  - repeat OR for seroma done yesterday   - Hemoglobin is low, will monitor, Check CBC at 8:00 pm  - Billy Ward.      Problem/Plan - 2:  ·  Problem: Hypertension.  Plan: BP stable.      Problem/Plan - 3:  ·  Problem: Prophylactic measure.  Plan: started on lovenox.     Patient is stable to be downgrade for ICU for further monitoring.  Discussed with Attending, Surgery PA, Dr Acevedo, And on call resident Dr. Guillen

## 2017-10-01 NOTE — PROGRESS NOTE ADULT - SUBJECTIVE AND OBJECTIVE BOX
INTERVAL HPI/OVERNIGHT EVENTS: complaints of pain over surgical site    PRESSORS: [ ] YES [x ] NO    Antimicrobial:    Cardiovascular:    Pulmonary:    Hematalogic:  enoxaparin Injectable 40 milliGRAM(s) SubCutaneous daily    Other:  sodium chloride 0.9% lock flush 3 milliLiter(s) IV Push every 8 hours  naloxone Injectable 0.1 milliGRAM(s) IV Push every 3 minutes PRN  ondansetron Injectable 4 milliGRAM(s) IV Push every 6 hours PRN  morphine  - Injectable 2 milliGRAM(s) IV Push every 4 hours PRN  lactated ringers. 1000 milliLiter(s) IV Continuous <Continuous>  ketorolac   Injectable 30 milliGRAM(s) IV Push every 6 hours  silver sulfADIAZINE 1% Cream 1 Application(s) Topical three times a day    sodium chloride 0.9% lock flush 3 milliLiter(s) IV Push every 8 hours  naloxone Injectable 0.1 milliGRAM(s) IV Push every 3 minutes PRN  ondansetron Injectable 4 milliGRAM(s) IV Push every 6 hours PRN  morphine  - Injectable 2 milliGRAM(s) IV Push every 4 hours PRN  enoxaparin Injectable 40 milliGRAM(s) SubCutaneous daily  lactated ringers. 1000 milliLiter(s) IV Continuous <Continuous>  ketorolac   Injectable 30 milliGRAM(s) IV Push every 6 hours  silver sulfADIAZINE 1% Cream 1 Application(s) Topical three times a day    Drug Dosing Weight  Height (cm): 157.48 (28 Sep 2017 09:18)  Weight (kg): 71.9 (28 Sep 2017 20:00)  BMI (kg/m2): 29 (28 Sep 2017 20:00)  BSA (m2): 1.73 (28 Sep 2017 20:00)    CENTRAL LINE: [ ] YES [ x] NO  LOCATION:   DATE INSERTED:  REMOVE: [ ] YES [ ] NO  EXPLAIN:    PICKARD: [ ] YES [ x] NO    DATE INSERTED:  REMOVE:  [ ] YES [ ] NO  EXPLAIN:    A-LINE:  [ ] YES [x ] NO  LOCATION:   DATE INSERTED:  REMOVE:  [ ] YES [ ] NO  EXPLAIN:    PMH -reviewed admission note, no change since admission  Heart faliure: acute [ ] chronic [ ] acute or chronic [ ] diastolic [ ] systolic [ ] combied systolic and diastolic[ ]  ADAM: ATN[ ] renal medullary necrosis [ ] CKD I [ ]CKDII [ ]CKD III [ ]CKD IV [ ]CKD V [ ]Other pathological lesions [ ]  Abdominal Nutrition Status: malnutrition [ ] cachexia [ ] morbid obesity/BMI=40 [ ] Supplement ordered [___________]     ICU Vital Signs Last 24 Hrs  T(C): 37.3 (01 Oct 2017 00:00), Max: 38.1 (30 Sep 2017 05:00)  T(F): 99.1 (01 Oct 2017 00:00), Max: 100.5 (30 Sep 2017 05:00)  HR: 87 (01 Oct 2017 00:00) (87 - 128)  BP: 115/52 (01 Oct 2017 00:00) (90/51 - 144/64)  BP(mean): 66 (01 Oct 2017 00:00) (60 - 86)  ABP: --  ABP(mean): --  RR: 15 (01 Oct 2017 00:00) (12 - 23)  SpO2: 100% (01 Oct 2017 00:00) (92% - 100%)            09-29 @ 07:01  -  09-30 @ 07:00  --------------------------------------------------------  IN: 825 mL / OUT: 1430 mL / NET: -605 mL            PHYSICAL EXAM:    GENERAL: [x ]NAD, [x ]well-groomed, [x ]well-developed  HEAD:  [x ]Atraumatic, [x ]Normocephalic  EYES: [x ]EOMI, [x ]PERRLA, [ ]conjunctiva and sclera clear  ENMT: [x ]No tonsillar erythema, exudates, or enlargement; [ ]Moist mucous membranes, [ ]Good dentition, [ ]No lesions  NECK: [x ]Supple, normal appearance, [ ]No JVD; [ ]Normal thyroid; [ ]Trachea midline  NERVOUS SYSTEM:  [x ]Alert & Oriented X3, [x ]Good concentration; [x ]Motor Strength 5/5 B/L upper and lower extremities; [ ]DTRs 2+ intact and symmetric  CHEST/LUNG: [x ]No chest deformity; [x ]Normal percussion bilaterally; [ ]No rales, rhonchi, wheezing   HEART: [x ]Regular rate and rhythm; [x ]No murmurs, rubs, or gallops  ABDOMEN: [x ]Soft, Nontender, Nondistended; [ ]Bowel sounds present  EXTREMITIES:  [x ]2+ Peripheral Pulses, [ ]No clubbing, cyanosis, or edema  LYMPH: [x ]No lymphadenopathy noted  SKIN: [x ]No rashes or lesions; [x ]Good capillary refill      LABS:  CBC Full  -  ( 30 Sep 2017 22:47 )  WBC Count : 11.5 K/uL  Hemoglobin : 8.3 g/dL  Hematocrit : 24.9 %  Platelet Count - Automated : 177 K/uL  Mean Cell Volume : 90.4 fl  Mean Cell Hemoglobin : 30.1 pg  Mean Cell Hemoglobin Concentration : 33.3 gm/dL  Auto Neutrophil # : x  Auto Lymphocyte # : x  Auto Monocyte # : x  Auto Eosinophil # : x  Auto Basophil # : x  Auto Neutrophil % : x  Auto Lymphocyte % : x  Auto Monocyte % : x  Auto Eosinophil % : x  Auto Basophil % : x    09-30    142  |  110<H>  |  9   ----------------------------<  118<H>  4.2   |  27  |  0.59    Ca    8.0<L>      30 Sep 2017 22:47  Phos  2.1     09-30  Mg     1.8     09-30    TPro  6.1  /  Alb  2.1<L>  /  TBili  0.4  /  DBili  x   /  AST  29  /  ALT  19  /  AlkPhos  43  09-30    PT/INR - ( 30 Sep 2017 08:16 )   PT: 12.8 sec;   INR: 1.17 ratio         PTT - ( 30 Sep 2017 08:16 )  PTT:31.2 sec        RADIOLOGY & ADDITIONAL STUDIES REVIEWED:  ***    [ ]GOALS OF CARE DISCUSSION WITH PATIENT/FAMILY/PROXY:    CRITICAL CARE TIME SPENT: 35 minutes

## 2017-10-01 NOTE — PROGRESS NOTE ADULT - ATTENDING COMMENTS
47 female with hx of HTN, HLD, breast cancer, admitted for mastectomy with breast reconstruction and SE flap, now doing well.  Stable for transfer out of ICU. 47 female with hx of HTN, HLD, breast cancer, admitted for mastectomy with breast reconstruction and SE flap, now doing well.  Hb down to 7.5 today, will recheck tonight.  Stable for transfer out of ICU.

## 2017-10-01 NOTE — PROGRESS NOTE ADULT - ASSESSMENT
48 y/o female w/ Breast Ca s/p Mastectomy & SE Flap Reconstruction, s/p Left Breast Exploration & Evacuation of Seroma; Hypophosphatemia      1. Continue regular diet  2. Out of bed to chair today  3. Silvadene to Left breast  4. ICU Downgrade with q 4 hour flap checks  5. Replace phosphorous
47 year old female medical history of HTN and HLD, found to have a left sided breast carcinoma and was scheduled for left mastectomy  . Procedures done : Axillary lymph node dissection on left  and Left mastectomy with axillary lymph node dissection. Plastics were called a abdominal flap was used for resconstruciton of the breast. 4 JADE drains were placed; 2 over the left breast and 2 both sides of the abdominal flap. She has a left radial line for monitoring and a Vioptex device for  perfusion monitoring of the surgical site. She was transferred to ICU for post-op monitoring.       Problem/Plan - 1:  ·  Problem: Malignant neoplasm.  Plan: s/p Axillary lymph node dissection on left  and Left mastectomy with axillary lymph node dissection. Plastics were called a abdominal flap was used for resconstruciton of the breast  -monitor output from JADE drains   - monitor perfusion via the Vioptix ( keep over 40-44; call surgery for decrease to 30 or below)  - check doppler every hours over the surgical site  - monitor for the skin color   - monitor BP; may give iv fluids but no pressors to prevent vasopressive action and necrosis of the site.   - pain management: toradol and morphine; may start PCA pump if needed   - will given iv tylenol 1g for 4 doses  - Billy Ward.      Problem/Plan - 2:  ·  Problem: Hypertension.  Plan: BP stable.      Problem/Plan - 3:  ·  Problem: Prophylactic measure.  Plan: started on lovenox.
47 year old female medical history of HTN and HLD, found to have a left sided breast carcinoma and was scheduled for left mastectomy  . Procedures done : Axillary lymph node dissection on left  and Left mastectomy with axillary lymph node dissection. Plastics were called a abdominal flap was used for resconstruciton of the breast. 4 JADE drains were placed; 2 over the left breast and 2 both sides of the abdominal flap. She has a left radial line for monitoring and a Vioptex device for  perfusion monitoring of the surgical site. She was transferred to ICU for post-op monitoring.       Problem/Plan - 1:  ·  Problem: Malignant neoplasm.  Plan: s/p Axillary lymph node dissection on left  and Left mastectomy with axillary lymph node dissection. Plastics were called a abdominal flap was used for resconstruciton of the breast  -monitor output from JADE drains   - monitor perfusion via the Vioptix ( keep over 40-44; call surgery for decrease to 30 or below)  - last noted was 41%  - check doppler every hours over the surgical site  - monitor for the skin color   - monitor BP; may give iv fluids but no pressors to prevent vasopressive action and necrosis of the site.   - pain management: toradol and morphine; may start PCA pump if needed   - will given iv tylenol 1g for 4 doses  - repeat OR for seroma done yesterday   - Billy Ward.      Problem/Plan - 2:  ·  Problem: Hypertension.  Plan: BP stable.      Problem/Plan - 3:  ·  Problem: Prophylactic measure.  Plan: started on lovenox.
47 year old female medical history of HTN and HLD, found to have a left sided breast carcinoma and was scheduled for left mastectomy today. She noticed tumour on the breast 4 months ago on a mammogram and presented with a 7/10 pain over the breast. 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 She had positive sentinel lymph node. Procedures done today: Axillary lymph node dissection on left  and Left mastectomy with axillary lymph node dissection. Plastics were called a abdominal flap was used for resconstruciton of the breast. 4 JADE drains were placed; 2 over the left breast and 2 both sides of the abdominal flap. Patient was given IV fluids but no intra-op antibiotics or blood. She was intubated for the procedure but was successfully extubated in the recovery room. She has a left radial line for monitoring and a Vioptex device for  perfusion monitoring of the surgical site. She was transferred to ICU for post-op monitoring.
POD#1 - s/p left breast recon with SE    Cont flap check q 1hr  OOB to chair  Start regular diet  No caffeine  Cont Toradol IV/Tylenol IV  DVT prophylaxis  Check CBC
POD#1/3 s/p left breast recon with SE flap    Cont flap check  Ok to transfer to floor - flap checks q 4hrs  Cont Toradol ( 3 days total)  Percocet as needed for pain  IVL  D/c bashir  OOB-->ambulate  Inc spirometer  No caffeine
POD#2 - s/p left breast reconstruction with SE flap    Swelling and tenderness left breast, with perfused, not congested flap. Given above, will plan for OR exploration.  Discussed plan with patient who agreed and understands. All questions answered. Pt consented.
Plan - POD#0/2    Cont flap check  Cont Toradol/tylenol  DVT prophyl  NC O2

## 2017-10-02 ENCOUNTER — TRANSCRIPTION ENCOUNTER (OUTPATIENT)
Age: 47
End: 2017-10-02

## 2017-10-02 LAB
ANION GAP SERPL CALC-SCNC: 9 MMOL/L — SIGNIFICANT CHANGE UP (ref 5–17)
BUN SERPL-MCNC: 11 MG/DL — SIGNIFICANT CHANGE UP (ref 7–18)
CALCIUM SERPL-MCNC: 8.4 MG/DL — SIGNIFICANT CHANGE UP (ref 8.4–10.5)
CHLORIDE SERPL-SCNC: 108 MMOL/L — SIGNIFICANT CHANGE UP (ref 96–108)
CO2 SERPL-SCNC: 26 MMOL/L — SIGNIFICANT CHANGE UP (ref 22–31)
CREAT SERPL-MCNC: 0.46 MG/DL — LOW (ref 0.5–1.3)
GLUCOSE SERPL-MCNC: 109 MG/DL — HIGH (ref 70–99)
HCT VFR BLD CALC: 25.5 % — LOW (ref 34.5–45)
HGB BLD-MCNC: 8.2 G/DL — LOW (ref 11.5–15.5)
MAGNESIUM SERPL-MCNC: 2 MG/DL — SIGNIFICANT CHANGE UP (ref 1.6–2.6)
MCHC RBC-ENTMCNC: 29.2 PG — SIGNIFICANT CHANGE UP (ref 27–34)
MCHC RBC-ENTMCNC: 32.2 GM/DL — SIGNIFICANT CHANGE UP (ref 32–36)
MCV RBC AUTO: 90.6 FL — SIGNIFICANT CHANGE UP (ref 80–100)
PHOSPHATE SERPL-MCNC: 2.6 MG/DL — SIGNIFICANT CHANGE UP (ref 2.5–4.5)
PLATELET # BLD AUTO: 216 K/UL — SIGNIFICANT CHANGE UP (ref 150–400)
POTASSIUM SERPL-MCNC: 4.1 MMOL/L — SIGNIFICANT CHANGE UP (ref 3.5–5.3)
POTASSIUM SERPL-SCNC: 4.1 MMOL/L — SIGNIFICANT CHANGE UP (ref 3.5–5.3)
RBC # BLD: 2.81 M/UL — LOW (ref 3.8–5.2)
RBC # FLD: 13.1 % — SIGNIFICANT CHANGE UP (ref 10.3–14.5)
SODIUM SERPL-SCNC: 143 MMOL/L — SIGNIFICANT CHANGE UP (ref 135–145)
WBC # BLD: 9.9 K/UL — SIGNIFICANT CHANGE UP (ref 3.8–10.5)
WBC # FLD AUTO: 9.9 K/UL — SIGNIFICANT CHANGE UP (ref 3.8–10.5)

## 2017-10-02 RX ORDER — POTASSIUM PHOSPHATE, MONOBASIC POTASSIUM PHOSPHATE, DIBASIC 236; 224 MG/ML; MG/ML
15 INJECTION, SOLUTION INTRAVENOUS ONCE
Qty: 0 | Refills: 0 | Status: COMPLETED | OUTPATIENT
Start: 2017-10-02 | End: 2017-10-02

## 2017-10-02 RX ORDER — ACETAMINOPHEN 500 MG
650 TABLET ORAL ONCE
Qty: 0 | Refills: 0 | Status: COMPLETED | OUTPATIENT
Start: 2017-10-02 | End: 2017-10-02

## 2017-10-02 RX ADMIN — Medication 1 APPLICATION(S): at 06:09

## 2017-10-02 RX ADMIN — ENOXAPARIN SODIUM 40 MILLIGRAM(S): 100 INJECTION SUBCUTANEOUS at 14:08

## 2017-10-02 RX ADMIN — SODIUM CHLORIDE 3 MILLILITER(S): 9 INJECTION INTRAMUSCULAR; INTRAVENOUS; SUBCUTANEOUS at 14:09

## 2017-10-02 RX ADMIN — SODIUM CHLORIDE 3 MILLILITER(S): 9 INJECTION INTRAMUSCULAR; INTRAVENOUS; SUBCUTANEOUS at 06:08

## 2017-10-02 RX ADMIN — POTASSIUM PHOSPHATE, MONOBASIC POTASSIUM PHOSPHATE, DIBASIC 62.5 MILLIMOLE(S): 236; 224 INJECTION, SOLUTION INTRAVENOUS at 14:09

## 2017-10-02 RX ADMIN — Medication 1 APPLICATION(S): at 21:40

## 2017-10-02 RX ADMIN — Medication 650 MILLIGRAM(S): at 21:50

## 2017-10-02 RX ADMIN — Medication 1 APPLICATION(S): at 14:08

## 2017-10-02 RX ADMIN — LOSARTAN POTASSIUM 50 MILLIGRAM(S): 100 TABLET, FILM COATED ORAL at 06:09

## 2017-10-02 RX ADMIN — Medication 650 MILLIGRAM(S): at 20:50

## 2017-10-02 RX ADMIN — SODIUM CHLORIDE 3 MILLILITER(S): 9 INJECTION INTRAMUSCULAR; INTRAVENOUS; SUBCUTANEOUS at 21:40

## 2017-10-02 NOTE — PROGRESS NOTE ADULT - SUBJECTIVE AND OBJECTIVE BOX
46 yo female seen and evaluated at bedside - POD 2/4 for left breast mastectomy and axillary dissection with SE flap reconstruction and then exploration. Patient has been more mobile today, walking to the bathroom and also up and sitting in the chair. After discussing with Dr. Acevedo he said that he would be OK with her going home today with all of her drains and then following up on Friday. Since the patient just got significantly mobile for the first time today she would feel more comfortable to stay at least until tomorrow. Patient otherwise doing well, pain better controlled, eating and tolerating diet.       Vitals: 98.7; HR: 84; BP: 133/74; RR: 15; O2 100%  General: no acute distress  CVS/ lungs: S1/S2 positive, air entry clear and equal bilaterally   Flap:  pink color, +brisk capillary refill, +good doppler signal, no signs of venous congestion, no major swelling, minimal appropriate. Vioptics: 42%, tenderness to palpation, incisions clean/ dry/ intact  abdomen: soft, non tender, non-distended, incision clean/ dry/ intact  extremities: minimal swelling of left arm, decreased ROM of LUE because of pain, other limbs are no edema with full ROM. no calf tenderness.    -A/P: 46 yo female POD 2/4 for left breast mastectomy and axillary dissection with SE flap reconstruction and then exploration.  -continue flap checks  -D/C planning, JADE drain teaching - most likely home tomorrow   -continue silvadiene application to lateral breast over the blister area  -out of bed to chair/ ambulation  -incentive spirometry

## 2017-10-02 NOTE — PROGRESS NOTE ADULT - SUBJECTIVE AND OBJECTIVE BOX
Vital Signs Last 24 Hrs  T(C): 37.1 (02 Oct 2017 05:09), Max: 37.1 (01 Oct 2017 12:16)  T(F): 98.7 (02 Oct 2017 05:09), Max: 98.8 (01 Oct 2017 21:00)  HR: 84 (02 Oct 2017 05:09) (84 - 110)  BP: 133/74 (02 Oct 2017 05:09) (116/68 - 175/67)  BP(mean): 88 (01 Oct 2017 11:00) (80 - 88)  RR: 15 (02 Oct 2017 05:09) (15 - 162)  SpO2: 100% (02 Oct 2017 05:09) (94% - 100%)    I&O's Detail    01 Oct 2017 07:01  -  02 Oct 2017 07:00  --------------------------------------------------------  IN:    lactated ringers.: 225 mL    Oral Fluid: 118 mL  Total IN: 343 mL    OUT:    Bulb: 90 mL    Bulb: 65 mL    Bulb: 122.5 mL    Bulb: 65 mL    Bulb: 40 mL    Indwelling Catheter - Urethral: 275 mL  Total OUT: 657.5 mL    Total NET: -314.5 mL                                7.5    11.3  )-----------( 169      ( 01 Oct 2017 06:27 )             24.6       10-01    141  |  110<H>  |  10  ----------------------------<  105<H>  4.3   |  26  |  0.57    Ca    8.0<L>      01 Oct 2017 06:27  Phos  2.2     10-01  Mg     2.1     10-01    TPro  6.1  /  Alb  2.1<L>  /  TBili  0.4  /  DBili  x   /  AST  29  /  ALT  19  /  AlkPhos  43  09-30      PT/INR - ( 30 Sep 2017 08:16 )   PT: 12.8 sec;   INR: 1.17 ratio         PTT - ( 30 Sep 2017 08:16 )  PTT:31.2 sec    flap viable    PLAN:    OOB to chair today

## 2017-10-02 NOTE — PROGRESS NOTE ADULT - ATTENDING COMMENTS
POD#2/4 s/p left breast recon with SE flap  Flap is viable with no signs of arterial or venous compromise    Cont flap check  OOB ambulate  DVT prophyl  D/c planning

## 2017-10-02 NOTE — PROGRESS NOTE ADULT - SUBJECTIVE AND OBJECTIVE BOX
No significant events overnight  Tx to floor  Pain under control    AVSS  L breast - flap is warm, no congestion, + doppler, Vioptix 41%  Shirley serosang  Abd - incision cdi, shirley serosang

## 2017-10-03 ENCOUNTER — TRANSCRIPTION ENCOUNTER (OUTPATIENT)
Age: 47
End: 2017-10-03

## 2017-10-03 VITALS
RESPIRATION RATE: 16 BRPM | HEART RATE: 93 BPM | DIASTOLIC BLOOD PRESSURE: 93 MMHG | SYSTOLIC BLOOD PRESSURE: 117 MMHG | OXYGEN SATURATION: 98 % | TEMPERATURE: 98 F

## 2017-10-03 PROCEDURE — 84100 ASSAY OF PHOSPHORUS: CPT

## 2017-10-03 PROCEDURE — 82607 VITAMIN B-12: CPT

## 2017-10-03 PROCEDURE — 80061 LIPID PANEL: CPT

## 2017-10-03 PROCEDURE — 85730 THROMBOPLASTIN TIME PARTIAL: CPT

## 2017-10-03 PROCEDURE — 83735 ASSAY OF MAGNESIUM: CPT

## 2017-10-03 PROCEDURE — 71045 X-RAY EXAM CHEST 1 VIEW: CPT

## 2017-10-03 PROCEDURE — 83036 HEMOGLOBIN GLYCOSYLATED A1C: CPT

## 2017-10-03 PROCEDURE — 82746 ASSAY OF FOLIC ACID SERUM: CPT

## 2017-10-03 PROCEDURE — A9541: CPT

## 2017-10-03 PROCEDURE — C1781: CPT

## 2017-10-03 PROCEDURE — 99238 HOSP IP/OBS DSCHRG MGMT 30/<: CPT | Mod: 24

## 2017-10-03 PROCEDURE — 85610 PROTHROMBIN TIME: CPT

## 2017-10-03 PROCEDURE — 80053 COMPREHEN METABOLIC PANEL: CPT

## 2017-10-03 PROCEDURE — 88331 PATH CONSLTJ SURG 1 BLK 1SPC: CPT

## 2017-10-03 PROCEDURE — 86900 BLOOD TYPING SEROLOGIC ABO: CPT

## 2017-10-03 PROCEDURE — 78195 LYMPH SYSTEM IMAGING: CPT

## 2017-10-03 PROCEDURE — 85027 COMPLETE CBC AUTOMATED: CPT

## 2017-10-03 PROCEDURE — 88334 PATH CONSLTJ SURG CYTO XM EA: CPT

## 2017-10-03 PROCEDURE — 81025 URINE PREGNANCY TEST: CPT

## 2017-10-03 PROCEDURE — 36415 COLL VENOUS BLD VENIPUNCTURE: CPT

## 2017-10-03 PROCEDURE — 94760 N-INVAS EAR/PLS OXIMETRY 1: CPT

## 2017-10-03 PROCEDURE — 86850 RBC ANTIBODY SCREEN: CPT

## 2017-10-03 PROCEDURE — 86920 COMPATIBILITY TEST SPIN: CPT

## 2017-10-03 PROCEDURE — 84443 ASSAY THYROID STIM HORMONE: CPT

## 2017-10-03 PROCEDURE — 82306 VITAMIN D 25 HYDROXY: CPT

## 2017-10-03 PROCEDURE — C1889: CPT

## 2017-10-03 PROCEDURE — 86901 BLOOD TYPING SEROLOGIC RH(D): CPT

## 2017-10-03 PROCEDURE — 88305 TISSUE EXAM BY PATHOLOGIST: CPT

## 2017-10-03 PROCEDURE — 80048 BASIC METABOLIC PNL TOTAL CA: CPT

## 2017-10-03 PROCEDURE — 88333 PATH CONSLTJ SURG CYTO XM 1: CPT

## 2017-10-03 PROCEDURE — 88307 TISSUE EXAM BY PATHOLOGIST: CPT

## 2017-10-03 RX ORDER — ASPIRIN/CALCIUM CARB/MAGNESIUM 324 MG
1 TABLET ORAL
Qty: 30 | Refills: 0
Start: 2017-10-03 | End: 2017-11-02

## 2017-10-03 RX ADMIN — Medication 1 APPLICATION(S): at 05:48

## 2017-10-03 RX ADMIN — SODIUM CHLORIDE 3 MILLILITER(S): 9 INJECTION INTRAMUSCULAR; INTRAVENOUS; SUBCUTANEOUS at 05:12

## 2017-10-03 RX ADMIN — Medication 1 APPLICATION(S): at 13:28

## 2017-10-03 RX ADMIN — LOSARTAN POTASSIUM 50 MILLIGRAM(S): 100 TABLET, FILM COATED ORAL at 05:51

## 2017-10-03 RX ADMIN — ENOXAPARIN SODIUM 40 MILLIGRAM(S): 100 INJECTION SUBCUTANEOUS at 13:28

## 2017-10-03 NOTE — DISCHARGE NOTE ADULT - CARE PROVIDER_API CALL
Tera Acevedo), ColonRectal Surgery; Plastic Surgery; Surgery; Surgery of the Hand  [address] [place] [address]  Phone: [phone]  Fax: [phone]

## 2017-10-03 NOTE — PROGRESS NOTE ADULT - PROVIDER SPECIALTY LIST ADULT
Critical Care
Critical Care
MICU
Plastic Surgery
Surgery
Plastic Surgery
Surgery
Pain Medicine

## 2017-10-03 NOTE — PROGRESS NOTE ADULT - SUBJECTIVE AND OBJECTIVE BOX
Vital Signs Last 24 Hrs  T(C): 36.7 (03 Oct 2017 05:15), Max: 37.3 (02 Oct 2017 21:31)  T(F): 98 (03 Oct 2017 05:15), Max: 99.1 (02 Oct 2017 21:31)  HR: 83 (03 Oct 2017 05:15) (83 - 95)  BP: 116/79 (03 Oct 2017 05:15) (116/79 - 144/80)  BP(mean): --  RR: 16 (03 Oct 2017 05:15) (16 - 17)  SpO2: 100% (03 Oct 2017 05:15) (98% - 100%)    I&O's Detail    02 Oct 2017 07:01  -  03 Oct 2017 07:00  --------------------------------------------------------  IN:  Total IN: 0 mL    OUT:    Bulb: 90 mL    Bulb: 117.5 mL    Bulb: 72.5 mL    Bulb: 78 mL    Bulb: 95 mL  Total OUT: 453 mL    Total NET: -453 mL                                8.2    9.9   )-----------( 216      ( 02 Oct 2017 07:04 )             25.5       10-02    143  |  108  |  11  ----------------------------<  109<H>  4.1   |  26  |  0.46<L>    Ca    8.4      02 Oct 2017 07:04  Phos  2.6     10-02  Mg     2.0     10-02    flap viable  Pt. moving well            PLAN:    Discharge home today  Drain removal by Dr. Acevedo in the office  RTO with me 2 weeks  Instructions given

## 2017-10-03 NOTE — PROGRESS NOTE ADULT - SUBJECTIVE AND OBJECTIVE BOX
48 yo female POD #3/5 from left mastectomy with axillary dissection and SE flap reconstruction. Patient had gone to the OR on POD 2 for exploration of threatened flap which showed that there was a seroma which was drained with placement of another JADE drain above the flap. Since then patient has been doing well. There continue to be strong doppler signals to flap with good color of flap and no minimal swelling. Patient's pain is improving and she has now been getting out of bed to chair and ambulating as well. Nurses taught patient how to measure JADE drain outputs yesterday and she should be ready to go home today.    Vitals: 98F; HR: 83; BP: 116/79; RR: 16; O2: 100%  Physical:   CVS/ lungs: S1/ S2 positive, air entry clear and equal bilaterally  Flap/ left breast: minimal swelling laterally, flap pink, no signs of venous congestion, +good doppler signal, +brisk capillary refill, minimal tednerness to palpation, incision clean/ dry/ intact. JADE drains in place,   abd: soft, non-distended, non-tender, incision clean/ dry/ intact.   extremities: minimal edema of the left upper extremity, slightly limitied ROM due to pain in LUE    JADE drain 3,4,5 (left breast): 117, 72, 95  JADE drain 1,2 (abdomen): 78, 90    -A/P: 48 yo female POD #3/5 from left mastectomy with axillary dissection and SE flap reconstruction, doing well, ready for discharge  -JADE drain teaching, teach patient how to strip/ measure/ record drain output - will be discharged with all 5 JADE drains in place  -continue silvadiene daily to lateral left breast  -to be discharged with ASA 81 daily x 30 days  -f/u in clinic with Dr. Acevedo either this Friday or Saturday

## 2017-10-03 NOTE — DISCHARGE NOTE ADULT - MEDICATION SUMMARY - MEDICATIONS TO TAKE
I will START or STAY ON the medications listed below when I get home from the hospital:    Percocet 5/325 oral tablet  -- 1 tab(s) by mouth every 4 hours, As Needed -for moderate pain MDD:6 tabs   -- Caution federal law prohibits the transfer of this drug to any person other  than the person for whom it was prescribed.  May cause drowsiness.  Alcohol may intensify this effect.  Use care when operating dangerous machinery.  This prescription cannot be refilled.  This product contains acetaminophen.  Do not use  with any other product containing acetaminophen to prevent possible liver damage.  Using more of this medication than prescribed may cause serious breathing problems.    -- Indication: For Prn pain     aspirin 81 mg oral delayed release tablet  -- 1 tab(s) by mouth once a day   -- Swallow whole.  Do not crush.  Take with food or milk.    -- Indication: For breast recon     Tylenol 325 mg oral tablet  -- 2 tab(s) by mouth every 4 hours, As Needed  -- Indication: For Mild pain     losartan 50 mg oral tablet  -- 1 tab(s) by mouth once a day  -- Indication: For Htn     simvastatin 10 mg oral tablet  -- 1 tab(s) by mouth once a day (at bedtime)  -- Indication: For Hld     silver sulfADIAZINE 1% topical cream  -- Apply on skin to affected area once a day x 14 days   -- Indication: For breast skin wound

## 2017-10-03 NOTE — DISCHARGE NOTE ADULT - HOSPITAL COURSE
46 y/o female Pt presented with c/o tumor on the left breast, since 4 months, which was noticed in the mammogram. Pt did not feel the lump before the mammo. But now pt states that she is able to feel the lump. Pt also has pain on the  breast 7/10    Patient was taken to the OR on 9/28 and underwent L mastectomy sentinel lymph node biopsy and Bernadette flap. Patient return to the OR on 9/30 for evacuation of seroma 9/30. Patient tolerated procedure well. Patient was downgraded from ICU, flap viable. Patient for discharge home with  x 5 and homecare and follow up with Dr. Acevedo.

## 2017-10-03 NOTE — DISCHARGE NOTE ADULT - PATIENT PORTAL LINK FT
“You can access the [facility] Patient Portal, offered by [facility], by registering with the following website: [email or web address]”

## 2017-10-03 NOTE — PROGRESS NOTE ADULT - SUBJECTIVE AND OBJECTIVE BOX
46 yo female POD #3/5 from left mastectomy with axillary dissection and SE flap reconstruction. Patient had gone to the OR on POD 2 for exploration of threatened flap which showed that there was a seroma which was drained with placement of another JADE drain above the flap. Since then patient has been doing well. There continue to be strong doppler signals to flap with good color of flap and no minimal swelling. Patient's pain is improving and she has now been getting out of bed to chair and ambulating as well. Nurses taught patient how to measure JADE drain outputs yesterday and she should be ready to go home today.    Vitals: 98F; HR: 83; BP: 116/79; RR: 16; O2: 100%  Physical:   CVS/ lungs: S1/ S2 positive, air entry clear and equal bilaterally  Flap/ left breast: minimal swelling laterally, flap pink, no signs of venous congestion, +good doppler signal, +brisk capillary refill, minimal tednerness to palpation, incision clean/ dry/ intact. JADE drains in place,   abd: soft, non-distended, non-tender, incision clean/ dry/ intact.   extremities: minimal edema of the left upper extremity, slightly limitied ROM due to pain in LUE    JADE drain 3,4,5 (left breast): 117, 72, 95  JADE drain 1,2 (abdomen): 78, 90    -A/P: 46 yo female POD #3/5 from left mastectomy with axillary dissection and SE flap reconstruction, doing well, ready for discharge  -JADE drain teaching, teach patient how to strip/ measure/ record drain output - will be discharged with all 5 JADE drains in place  -continue silvadiene daily to lateral left breast  -to be discharged with ASA 81 daily x 30 days  -f/u in clinic with Dr. Acevedo either this Friday or Saturday

## 2017-10-03 NOTE — DISCHARGE NOTE ADULT - CARE PLAN
Principal Discharge DX:	Malignant neoplasm  Goal:	wound healing  Instructions for follow-up, activity and diet:	Please follow up Dr. Acevedo within 1 week   No lifting with L upper extremity   diet as tolerated   You will be discharged with JADE drains. You will need to empty them and record outputs accurately. This will be taught to you by the nursing staff. Please do not remove the JADE drains. They will be removed in the office. Please bring to the office accurate records of output.  Secondary Diagnosis:	Essential hypertension  Goal:	continue current care

## 2017-10-03 NOTE — PROGRESS NOTE ADULT - ATTENDING COMMENTS
Pt seen and examined. Agree with above.  Flap is viable, stable.     Ok to DC home today  JADE teaching  ASA/Silvadene/Percocet  Follow up this friday or saturday

## 2017-10-03 NOTE — DISCHARGE NOTE ADULT - PLAN OF CARE
wound healing Please follow up Dr. Acevedo within 1 week   No lifting with L upper extremity   diet as tolerated   You will be discharged with JADE drains. You will need to empty them and record outputs accurately. This will be taught to you by the nursing staff. Please do not remove the JADE drains. They will be removed in the office. Please bring to the office accurate records of output. continue current care

## 2017-10-04 LAB — SURGICAL PATHOLOGY FINAL REPORT - CH: SIGNIFICANT CHANGE UP

## 2017-10-06 DIAGNOSIS — I10 ESSENTIAL (PRIMARY) HYPERTENSION: ICD-10-CM

## 2017-10-06 DIAGNOSIS — C50.912 MALIGNANT NEOPLASM OF UNSPECIFIED SITE OF LEFT FEMALE BREAST: ICD-10-CM

## 2017-10-06 DIAGNOSIS — L76.34 POSTPROCEDURAL SEROMA OF SKIN AND SUBCUTANEOUS TISSUE FOLLOWING OTHER PROCEDURE: ICD-10-CM

## 2017-10-06 DIAGNOSIS — E78.5 HYPERLIPIDEMIA, UNSPECIFIED: ICD-10-CM

## 2017-10-07 ENCOUNTER — APPOINTMENT (OUTPATIENT)
Dept: PLASTIC SURGERY | Facility: CLINIC | Age: 47
End: 2017-10-07
Payer: MEDICAID

## 2017-10-07 PROCEDURE — 99024 POSTOP FOLLOW-UP VISIT: CPT

## 2017-10-10 ENCOUNTER — TRANSCRIPTION ENCOUNTER (OUTPATIENT)
Age: 47
End: 2017-10-10

## 2017-10-11 ENCOUNTER — TRANSCRIPTION ENCOUNTER (OUTPATIENT)
Age: 47
End: 2017-10-11

## 2017-10-12 ENCOUNTER — APPOINTMENT (OUTPATIENT)
Dept: PLASTIC SURGERY | Facility: CLINIC | Age: 47
End: 2017-10-12
Payer: MEDICAID

## 2017-10-12 VITALS
HEIGHT: 66 IN | WEIGHT: 157 LBS | RESPIRATION RATE: 15 BRPM | BODY MASS INDEX: 25.23 KG/M2 | DIASTOLIC BLOOD PRESSURE: 86 MMHG | SYSTOLIC BLOOD PRESSURE: 136 MMHG | HEART RATE: 93 BPM

## 2017-10-12 DIAGNOSIS — E83.39 OTHER DISORDERS OF PHOSPHORUS METABOLISM: ICD-10-CM

## 2017-10-12 DIAGNOSIS — D62 ACUTE POSTHEMORRHAGIC ANEMIA: ICD-10-CM

## 2017-10-12 DIAGNOSIS — C77.3 SECONDARY AND UNSPECIFIED MALIGNANT NEOPLASM OF AXILLA AND UPPER LIMB LYMPH NODES: ICD-10-CM

## 2017-10-12 PROCEDURE — 99024 POSTOP FOLLOW-UP VISIT: CPT

## 2017-10-16 ENCOUNTER — APPOINTMENT (OUTPATIENT)
Dept: SURGICAL ONCOLOGY | Facility: CLINIC | Age: 47
End: 2017-10-16
Payer: MEDICAID

## 2017-10-16 VITALS
TEMPERATURE: 98.8 F | HEIGHT: 66 IN | HEART RATE: 87 BPM | OXYGEN SATURATION: 99 % | RESPIRATION RATE: 14 BRPM | WEIGHT: 155 LBS | SYSTOLIC BLOOD PRESSURE: 117 MMHG | BODY MASS INDEX: 24.91 KG/M2 | DIASTOLIC BLOOD PRESSURE: 79 MMHG

## 2017-10-16 PROCEDURE — 99024 POSTOP FOLLOW-UP VISIT: CPT

## 2017-10-18 ENCOUNTER — OUTPATIENT (OUTPATIENT)
Dept: OUTPATIENT SERVICES | Facility: HOSPITAL | Age: 47
LOS: 1 days | Discharge: HOME | End: 2017-10-18

## 2017-10-18 DIAGNOSIS — C50.919 MALIGNANT NEOPLASM OF UNSPECIFIED SITE OF UNSPECIFIED FEMALE BREAST: ICD-10-CM

## 2017-10-20 ENCOUNTER — RESULT REVIEW (OUTPATIENT)
Age: 47
End: 2017-10-20

## 2017-10-20 ENCOUNTER — APPOINTMENT (OUTPATIENT)
Dept: HEMATOLOGY ONCOLOGY | Facility: CLINIC | Age: 47
End: 2017-10-20
Payer: MEDICAID

## 2017-10-20 VITALS
BODY MASS INDEX: 26.48 KG/M2 | SYSTOLIC BLOOD PRESSURE: 146 MMHG | HEART RATE: 79 BPM | OXYGEN SATURATION: 100 % | HEIGHT: 62.28 IN | DIASTOLIC BLOOD PRESSURE: 84 MMHG | RESPIRATION RATE: 14 BRPM | TEMPERATURE: 97.9 F | WEIGHT: 145.73 LBS

## 2017-10-20 LAB
BASOPHILS # BLD AUTO: 0 K/UL — SIGNIFICANT CHANGE UP (ref 0–0.2)
BASOPHILS NFR BLD AUTO: 0.5 % — SIGNIFICANT CHANGE UP (ref 0–2)
EOSINOPHIL # BLD AUTO: 0.2 K/UL — SIGNIFICANT CHANGE UP (ref 0–0.5)
EOSINOPHIL NFR BLD AUTO: 2.1 % — SIGNIFICANT CHANGE UP (ref 0–6)
HCT VFR BLD CALC: 30 % — LOW (ref 34.5–45)
HGB BLD-MCNC: 10.1 G/DL — LOW (ref 11.5–15.5)
LYMPHOCYTES # BLD AUTO: 2.7 K/UL — SIGNIFICANT CHANGE UP (ref 1–3.3)
LYMPHOCYTES # BLD AUTO: 28.8 % — SIGNIFICANT CHANGE UP (ref 13–44)
MCHC RBC-ENTMCNC: 28.2 PG — SIGNIFICANT CHANGE UP (ref 27–34)
MCHC RBC-ENTMCNC: 33.7 G/DL — SIGNIFICANT CHANGE UP (ref 32–36)
MCV RBC AUTO: 83.6 FL — SIGNIFICANT CHANGE UP (ref 80–100)
MONOCYTES # BLD AUTO: 0.3 K/UL — SIGNIFICANT CHANGE UP (ref 0–0.9)
MONOCYTES NFR BLD AUTO: 3.5 % — SIGNIFICANT CHANGE UP (ref 2–14)
NEUTROPHILS # BLD AUTO: 6.1 K/UL — SIGNIFICANT CHANGE UP (ref 1.8–7.4)
NEUTROPHILS NFR BLD AUTO: 65.1 % — SIGNIFICANT CHANGE UP (ref 43–77)
PLATELET # BLD AUTO: 435 K/UL — HIGH (ref 150–400)
RBC # BLD: 3.59 M/UL — LOW (ref 3.8–5.2)
RBC # FLD: 13.2 % — SIGNIFICANT CHANGE UP (ref 10.3–14.5)
WBC # BLD: 9.4 K/UL — SIGNIFICANT CHANGE UP (ref 3.8–10.5)
WBC # FLD AUTO: 9.4 K/UL — SIGNIFICANT CHANGE UP (ref 3.8–10.5)

## 2017-10-20 PROCEDURE — 99205 OFFICE O/P NEW HI 60 MIN: CPT

## 2017-10-23 LAB
ALBUMIN SERPL ELPH-MCNC: 3.7 G/DL
ALP BLD-CCNC: 56 U/L
ALT SERPL-CCNC: 23 U/L
ANION GAP SERPL CALC-SCNC: 16 MMOL/L
AST SERPL-CCNC: 18 U/L
BILIRUB SERPL-MCNC: 0.4 MG/DL
BUN SERPL-MCNC: 10 MG/DL
CALCIUM SERPL-MCNC: 9.8 MG/DL
CHLORIDE SERPL-SCNC: 106 MMOL/L
CO2 SERPL-SCNC: 23 MMOL/L
CREAT SERPL-MCNC: 0.69 MG/DL
GLUCOSE SERPL-MCNC: 93 MG/DL
HAV IGM SER QL: NONREACTIVE
HBV CORE IGM SER QL: NONREACTIVE
HBV SURFACE AG SER QL: NONREACTIVE
HCV AB SER QL: NONREACTIVE
HCV S/CO RATIO: 0.13 S/CO
POTASSIUM SERPL-SCNC: 4.8 MMOL/L
PROT SERPL-MCNC: 7.2 G/DL
SODIUM SERPL-SCNC: 145 MMOL/L

## 2017-10-25 DIAGNOSIS — C50.912 MALIGNANT NEOPLASM OF UNSPECIFIED SITE OF LEFT FEMALE BREAST: ICD-10-CM

## 2017-11-01 ENCOUNTER — RESULT REVIEW (OUTPATIENT)
Age: 47
End: 2017-11-01

## 2017-11-01 DIAGNOSIS — Z90.12 ACQUIRED ABSENCE OF LEFT BREAST AND NIPPLE: Chronic | ICD-10-CM

## 2017-11-03 ENCOUNTER — APPOINTMENT (OUTPATIENT)
Dept: PLASTIC SURGERY | Facility: CLINIC | Age: 47
End: 2017-11-03
Payer: MEDICAID

## 2017-11-03 PROCEDURE — 99024 POSTOP FOLLOW-UP VISIT: CPT

## 2017-11-06 ENCOUNTER — RESULT REVIEW (OUTPATIENT)
Age: 47
End: 2017-11-06

## 2017-11-06 ENCOUNTER — APPOINTMENT (OUTPATIENT)
Dept: INFUSION THERAPY | Facility: HOSPITAL | Age: 47
End: 2017-11-06

## 2017-11-06 ENCOUNTER — OTHER (OUTPATIENT)
Age: 47
End: 2017-11-06

## 2017-11-06 LAB
BASOPHILS # BLD AUTO: 0 K/UL — SIGNIFICANT CHANGE UP (ref 0–0.2)
BASOPHILS NFR BLD AUTO: 0.1 % — SIGNIFICANT CHANGE UP (ref 0–2)
EOSINOPHIL # BLD AUTO: 0 K/UL — SIGNIFICANT CHANGE UP (ref 0–0.5)
EOSINOPHIL NFR BLD AUTO: 0 % — SIGNIFICANT CHANGE UP (ref 0–6)
HCT VFR BLD CALC: 29.6 % — LOW (ref 34.5–45)
HGB BLD-MCNC: 10 G/DL — LOW (ref 11.5–15.5)
LYMPHOCYTES # BLD AUTO: 2.2 K/UL — SIGNIFICANT CHANGE UP (ref 1–3.3)
LYMPHOCYTES # BLD AUTO: 9.7 % — LOW (ref 13–44)
MCHC RBC-ENTMCNC: 27.4 PG — SIGNIFICANT CHANGE UP (ref 27–34)
MCHC RBC-ENTMCNC: 33.8 G/DL — SIGNIFICANT CHANGE UP (ref 32–36)
MCV RBC AUTO: 81 FL — SIGNIFICANT CHANGE UP (ref 80–100)
MONOCYTES # BLD AUTO: 1 K/UL — HIGH (ref 0–0.9)
MONOCYTES NFR BLD AUTO: 4.2 % — SIGNIFICANT CHANGE UP (ref 2–14)
NEUTROPHILS # BLD AUTO: 19.5 K/UL — HIGH (ref 1.8–7.4)
NEUTROPHILS NFR BLD AUTO: 86 % — HIGH (ref 43–77)
PLATELET # BLD AUTO: 346 K/UL — SIGNIFICANT CHANGE UP (ref 150–400)
RBC # BLD: 3.65 M/UL — LOW (ref 3.8–5.2)
RBC # FLD: 14.4 % — SIGNIFICANT CHANGE UP (ref 10.3–14.5)
WBC # BLD: 22.7 K/UL — HIGH (ref 3.8–10.5)
WBC # FLD AUTO: 22.7 K/UL — HIGH (ref 3.8–10.5)

## 2017-11-07 DIAGNOSIS — Z51.11 ENCOUNTER FOR ANTINEOPLASTIC CHEMOTHERAPY: ICD-10-CM

## 2017-11-07 DIAGNOSIS — Z51.89 ENCOUNTER FOR OTHER SPECIFIED AFTERCARE: ICD-10-CM

## 2017-11-07 DIAGNOSIS — R11.2 NAUSEA WITH VOMITING, UNSPECIFIED: ICD-10-CM

## 2017-11-13 ENCOUNTER — RESULT REVIEW (OUTPATIENT)
Age: 47
End: 2017-11-13

## 2017-11-13 ENCOUNTER — APPOINTMENT (OUTPATIENT)
Dept: HEMATOLOGY ONCOLOGY | Facility: CLINIC | Age: 47
End: 2017-11-13
Payer: MEDICAID

## 2017-11-13 VITALS
SYSTOLIC BLOOD PRESSURE: 120 MMHG | HEART RATE: 96 BPM | WEIGHT: 144.4 LBS | OXYGEN SATURATION: 100 % | BODY MASS INDEX: 26.17 KG/M2 | TEMPERATURE: 98.5 F | RESPIRATION RATE: 16 BRPM | DIASTOLIC BLOOD PRESSURE: 83 MMHG

## 2017-11-13 LAB
ANISOCYTOSIS BLD QL: SLIGHT — SIGNIFICANT CHANGE UP
BASOPHILS # BLD AUTO: 0.1 K/UL — SIGNIFICANT CHANGE UP (ref 0–0.2)
BASOPHILS NFR BLD AUTO: 1 % — SIGNIFICANT CHANGE UP (ref 0–2)
DACRYOCYTES BLD QL SMEAR: SLIGHT — SIGNIFICANT CHANGE UP
DOHLE BOD BLD QL SMEAR: PRESENT — SIGNIFICANT CHANGE UP
ELLIPTOCYTES BLD QL SMEAR: SLIGHT — SIGNIFICANT CHANGE UP
EOSINOPHIL # BLD AUTO: 0.3 K/UL — SIGNIFICANT CHANGE UP (ref 0–0.5)
EOSINOPHIL NFR BLD AUTO: 1 % — SIGNIFICANT CHANGE UP (ref 0–6)
GIANT PLATELETS BLD QL SMEAR: PRESENT — SIGNIFICANT CHANGE UP
HCT VFR BLD CALC: 30.3 % — LOW (ref 34.5–45)
HGB BLD-MCNC: 10.1 G/DL — LOW (ref 11.5–15.5)
HYPOCHROMIA BLD QL: SLIGHT — SIGNIFICANT CHANGE UP
LG PLATELETS BLD QL AUTO: SLIGHT — SIGNIFICANT CHANGE UP
LYMPHOCYTES # BLD AUTO: 15 % — SIGNIFICANT CHANGE UP (ref 13–44)
LYMPHOCYTES # BLD AUTO: 3.4 K/UL — HIGH (ref 1–3.3)
MACROCYTES BLD QL: SLIGHT — SIGNIFICANT CHANGE UP
MCHC RBC-ENTMCNC: 26.9 PG — LOW (ref 27–34)
MCHC RBC-ENTMCNC: 33.3 G/DL — SIGNIFICANT CHANGE UP (ref 32–36)
MCV RBC AUTO: 80.9 FL — SIGNIFICANT CHANGE UP (ref 80–100)
METAMYELOCYTES # FLD: 8 % — HIGH (ref 0–0)
MICROCYTES BLD QL: SLIGHT — SIGNIFICANT CHANGE UP
MONOCYTES # BLD AUTO: 3 K/UL — HIGH (ref 0–0.9)
MONOCYTES NFR BLD AUTO: 18 % — HIGH (ref 2–14)
MYELOCYTES NFR BLD: 4 % — HIGH (ref 0–0)
NEUTROPHILS # BLD AUTO: 17.2 K/UL — HIGH (ref 1.8–7.4)
NEUTROPHILS NFR BLD AUTO: 47 % — SIGNIFICANT CHANGE UP (ref 43–77)
NEUTS BAND # BLD: 4 % — SIGNIFICANT CHANGE UP (ref 0–8)
NRBC # BLD: 3 /100 — HIGH (ref 0–0)
PLAT MORPH BLD: ABNORMAL
PLATELET # BLD AUTO: 214 K/UL — SIGNIFICANT CHANGE UP (ref 150–400)
POIKILOCYTOSIS BLD QL AUTO: SLIGHT — SIGNIFICANT CHANGE UP
POLYCHROMASIA BLD QL SMEAR: SLIGHT — SIGNIFICANT CHANGE UP
PROMYELOCYTES # FLD: 2 % — HIGH (ref 0–0)
RBC # BLD: 3.74 M/UL — LOW (ref 3.8–5.2)
RBC # FLD: 14.9 % — HIGH (ref 10.3–14.5)
RBC BLD AUTO: ABNORMAL
SCHISTOCYTES BLD QL AUTO: SLIGHT — SIGNIFICANT CHANGE UP
SMUDGE CELLS # BLD: PRESENT — SIGNIFICANT CHANGE UP
TARGETS BLD QL SMEAR: SLIGHT — SIGNIFICANT CHANGE UP
TOXIC GRANULES BLD QL SMEAR: PRESENT — SIGNIFICANT CHANGE UP
WBC # BLD: 23.9 K/UL — HIGH (ref 3.8–10.5)
WBC # FLD AUTO: 23.9 K/UL — HIGH (ref 3.8–10.5)

## 2017-11-13 PROCEDURE — 99215 OFFICE O/P EST HI 40 MIN: CPT

## 2017-11-16 LAB
ALBUMIN SERPL ELPH-MCNC: 4 G/DL
ALP BLD-CCNC: 71 U/L
ALT SERPL-CCNC: 33 U/L
ANION GAP SERPL CALC-SCNC: 13 MMOL/L
AST SERPL-CCNC: 29 U/L
BILIRUB SERPL-MCNC: 0.4 MG/DL
BUN SERPL-MCNC: 10 MG/DL
CALCIUM SERPL-MCNC: 9.6 MG/DL
CHLORIDE SERPL-SCNC: 101 MMOL/L
CO2 SERPL-SCNC: 27 MMOL/L
CREAT SERPL-MCNC: 0.67 MG/DL
GLUCOSE SERPL-MCNC: 92 MG/DL
POTASSIUM SERPL-SCNC: 4.4 MMOL/L
PROT SERPL-MCNC: 7.2 G/DL
SODIUM SERPL-SCNC: 141 MMOL/L

## 2017-11-17 ENCOUNTER — OUTPATIENT (OUTPATIENT)
Dept: OUTPATIENT SERVICES | Facility: HOSPITAL | Age: 47
LOS: 1 days | Discharge: HOME | End: 2017-11-17

## 2017-11-17 DIAGNOSIS — C50.912 MALIGNANT NEOPLASM OF UNSPECIFIED SITE OF LEFT FEMALE BREAST: ICD-10-CM

## 2017-11-17 DIAGNOSIS — Z90.12 ACQUIRED ABSENCE OF LEFT BREAST AND NIPPLE: Chronic | ICD-10-CM

## 2017-11-27 ENCOUNTER — RESULT REVIEW (OUTPATIENT)
Age: 47
End: 2017-11-27

## 2017-11-27 ENCOUNTER — APPOINTMENT (OUTPATIENT)
Dept: INFUSION THERAPY | Facility: HOSPITAL | Age: 47
End: 2017-11-27

## 2017-11-27 LAB
BASOPHILS # BLD AUTO: 0 K/UL — SIGNIFICANT CHANGE UP (ref 0–0.2)
BASOPHILS NFR BLD AUTO: 0 % — SIGNIFICANT CHANGE UP (ref 0–2)
EOSINOPHIL # BLD AUTO: 0.1 K/UL — SIGNIFICANT CHANGE UP (ref 0–0.5)
EOSINOPHIL NFR BLD AUTO: 0.4 % — SIGNIFICANT CHANGE UP (ref 0–6)
HCT VFR BLD CALC: 30.8 % — LOW (ref 34.5–45)
HGB BLD-MCNC: 10.1 G/DL — LOW (ref 11.5–15.5)
LYMPHOCYTES # BLD AUTO: 1.3 K/UL — SIGNIFICANT CHANGE UP (ref 1–3.3)
LYMPHOCYTES # BLD AUTO: 6 % — LOW (ref 13–44)
MCHC RBC-ENTMCNC: 27.3 PG — SIGNIFICANT CHANGE UP (ref 27–34)
MCHC RBC-ENTMCNC: 32.9 G/DL — SIGNIFICANT CHANGE UP (ref 32–36)
MCV RBC AUTO: 83 FL — SIGNIFICANT CHANGE UP (ref 80–100)
MONOCYTES # BLD AUTO: 0.7 K/UL — SIGNIFICANT CHANGE UP (ref 0–0.9)
MONOCYTES NFR BLD AUTO: 3.3 % — SIGNIFICANT CHANGE UP (ref 2–14)
NEUTROPHILS # BLD AUTO: 19.8 K/UL — HIGH (ref 1.8–7.4)
NEUTROPHILS NFR BLD AUTO: 90.4 % — HIGH (ref 43–77)
PLATELET # BLD AUTO: 476 K/UL — HIGH (ref 150–400)
RBC # BLD: 3.71 M/UL — LOW (ref 3.8–5.2)
RBC # FLD: 17.7 % — HIGH (ref 10.3–14.5)
WBC # BLD: 22 K/UL — HIGH (ref 3.8–10.5)
WBC # FLD AUTO: 22 K/UL — HIGH (ref 3.8–10.5)

## 2017-11-28 DIAGNOSIS — R11.2 NAUSEA WITH VOMITING, UNSPECIFIED: ICD-10-CM

## 2017-11-28 DIAGNOSIS — Z51.89 ENCOUNTER FOR OTHER SPECIFIED AFTERCARE: ICD-10-CM

## 2017-11-28 DIAGNOSIS — Z51.11 ENCOUNTER FOR ANTINEOPLASTIC CHEMOTHERAPY: ICD-10-CM

## 2017-12-04 ENCOUNTER — RESULT REVIEW (OUTPATIENT)
Age: 47
End: 2017-12-04

## 2017-12-04 ENCOUNTER — APPOINTMENT (OUTPATIENT)
Dept: HEMATOLOGY ONCOLOGY | Facility: CLINIC | Age: 47
End: 2017-12-04
Payer: MEDICAID

## 2017-12-04 VITALS
DIASTOLIC BLOOD PRESSURE: 76 MMHG | BODY MASS INDEX: 26.81 KG/M2 | SYSTOLIC BLOOD PRESSURE: 118 MMHG | TEMPERATURE: 98.2 F | RESPIRATION RATE: 16 BRPM | HEART RATE: 87 BPM | WEIGHT: 147.93 LBS | OXYGEN SATURATION: 100 %

## 2017-12-04 DIAGNOSIS — M89.8X9 OTHER SPECIFIED DISORDERS OF BONE, UNSPECIFIED SITE: ICD-10-CM

## 2017-12-04 LAB
ANISOCYTOSIS BLD QL: SLIGHT — SIGNIFICANT CHANGE UP
BASOPHILS # BLD AUTO: 0.2 K/UL — SIGNIFICANT CHANGE UP (ref 0–0.2)
DACRYOCYTES BLD QL SMEAR: SLIGHT — SIGNIFICANT CHANGE UP
ELLIPTOCYTES BLD QL SMEAR: SLIGHT — SIGNIFICANT CHANGE UP
EOSINOPHIL # BLD AUTO: 0.1 K/UL — SIGNIFICANT CHANGE UP (ref 0–0.5)
HCT VFR BLD CALC: 29.7 % — LOW (ref 34.5–45)
HGB BLD-MCNC: 9.8 G/DL — LOW (ref 11.5–15.5)
HYPOCHROMIA BLD QL: SLIGHT — SIGNIFICANT CHANGE UP
LYMPHOCYTES # BLD AUTO: 12 % — LOW (ref 13–44)
LYMPHOCYTES # BLD AUTO: 3.1 K/UL — SIGNIFICANT CHANGE UP (ref 1–3.3)
MCHC RBC-ENTMCNC: 27.1 PG — SIGNIFICANT CHANGE UP (ref 27–34)
MCHC RBC-ENTMCNC: 33 G/DL — SIGNIFICANT CHANGE UP (ref 32–36)
MCV RBC AUTO: 82 FL — SIGNIFICANT CHANGE UP (ref 80–100)
METAMYELOCYTES # FLD: 12 % — HIGH (ref 0–0)
MICROCYTES BLD QL: SLIGHT — SIGNIFICANT CHANGE UP
MONOCYTES # BLD AUTO: 3.9 K/UL — HIGH (ref 0–0.9)
MONOCYTES NFR BLD AUTO: 13 % — SIGNIFICANT CHANGE UP (ref 2–14)
MYELOCYTES NFR BLD: 18 % — HIGH (ref 0–0)
NEUTROPHILS # BLD AUTO: 25.6 K/UL — HIGH (ref 1.8–7.4)
NEUTROPHILS NFR BLD AUTO: 30 % — LOW (ref 43–77)
NEUTS BAND # BLD: 14 % — HIGH (ref 0–8)
PLAT MORPH BLD: NORMAL — SIGNIFICANT CHANGE UP
PLATELET # BLD AUTO: 244 K/UL — SIGNIFICANT CHANGE UP (ref 150–400)
POIKILOCYTOSIS BLD QL AUTO: SLIGHT — SIGNIFICANT CHANGE UP
POLYCHROMASIA BLD QL SMEAR: SLIGHT — SIGNIFICANT CHANGE UP
PROMYELOCYTES # FLD: 1 % — HIGH (ref 0–0)
RBC # BLD: 3.62 M/UL — LOW (ref 3.8–5.2)
RBC # FLD: 17.7 % — HIGH (ref 10.3–14.5)
RBC BLD AUTO: ABNORMAL
SCHISTOCYTES BLD QL AUTO: SLIGHT — SIGNIFICANT CHANGE UP
WBC # BLD: 32.8 K/UL — HIGH (ref 3.8–10.5)
WBC # FLD AUTO: 32.8 K/UL — HIGH (ref 3.8–10.5)

## 2017-12-04 PROCEDURE — 99215 OFFICE O/P EST HI 40 MIN: CPT

## 2017-12-04 RX ORDER — OXYCODONE AND ACETAMINOPHEN 5; 325 MG/1; MG/1
5-325 TABLET ORAL
Qty: 20 | Refills: 0 | Status: DISCONTINUED | COMMUNITY
Start: 2017-11-02 | End: 2017-12-04

## 2017-12-05 PROBLEM — M89.8X9 BONE PAIN DUE TO G-CSF: Status: ACTIVE | Noted: 2017-12-05

## 2017-12-05 LAB
ALBUMIN SERPL ELPH-MCNC: 4 G/DL
ALP BLD-CCNC: 90 U/L
ALT SERPL-CCNC: 28 U/L
ANION GAP SERPL CALC-SCNC: 14 MMOL/L
AST SERPL-CCNC: 26 U/L
BILIRUB SERPL-MCNC: 0.4 MG/DL
BUN SERPL-MCNC: 9 MG/DL
CALCIUM SERPL-MCNC: 10 MG/DL
CHLORIDE SERPL-SCNC: 98 MMOL/L
CO2 SERPL-SCNC: 27 MMOL/L
CREAT SERPL-MCNC: 0.77 MG/DL
GLUCOSE SERPL-MCNC: 104 MG/DL
POTASSIUM SERPL-SCNC: 4.4 MMOL/L
PROT SERPL-MCNC: 6.9 G/DL
SODIUM SERPL-SCNC: 139 MMOL/L

## 2017-12-12 ENCOUNTER — OUTPATIENT (OUTPATIENT)
Dept: OUTPATIENT SERVICES | Facility: HOSPITAL | Age: 47
LOS: 1 days | Discharge: HOME | End: 2017-12-12

## 2017-12-12 DIAGNOSIS — C50.912 MALIGNANT NEOPLASM OF UNSPECIFIED SITE OF LEFT FEMALE BREAST: ICD-10-CM

## 2017-12-12 DIAGNOSIS — Z90.12 ACQUIRED ABSENCE OF LEFT BREAST AND NIPPLE: Chronic | ICD-10-CM

## 2017-12-15 ENCOUNTER — APPOINTMENT (OUTPATIENT)
Dept: PLASTIC SURGERY | Facility: CLINIC | Age: 47
End: 2017-12-15
Payer: MEDICAID

## 2017-12-15 PROCEDURE — 99024 POSTOP FOLLOW-UP VISIT: CPT

## 2017-12-18 ENCOUNTER — RESULT REVIEW (OUTPATIENT)
Age: 47
End: 2017-12-18

## 2017-12-18 ENCOUNTER — APPOINTMENT (OUTPATIENT)
Dept: INFUSION THERAPY | Facility: HOSPITAL | Age: 47
End: 2017-12-18

## 2017-12-18 LAB
BASOPHILS # BLD AUTO: 0 K/UL — SIGNIFICANT CHANGE UP (ref 0–0.2)
BASOPHILS NFR BLD AUTO: 0.1 % — SIGNIFICANT CHANGE UP (ref 0–2)
EOSINOPHIL # BLD AUTO: 0.1 K/UL — SIGNIFICANT CHANGE UP (ref 0–0.5)
EOSINOPHIL NFR BLD AUTO: 0.8 % — SIGNIFICANT CHANGE UP (ref 0–6)
HCT VFR BLD CALC: 29.4 % — LOW (ref 34.5–45)
HGB BLD-MCNC: 9.8 G/DL — LOW (ref 11.5–15.5)
LYMPHOCYTES # BLD AUTO: 1.1 K/UL — SIGNIFICANT CHANGE UP (ref 1–3.3)
LYMPHOCYTES # BLD AUTO: 6.3 % — LOW (ref 13–44)
MCHC RBC-ENTMCNC: 27.8 PG — SIGNIFICANT CHANGE UP (ref 27–34)
MCHC RBC-ENTMCNC: 33.4 G/DL — SIGNIFICANT CHANGE UP (ref 32–36)
MCV RBC AUTO: 83.1 FL — SIGNIFICANT CHANGE UP (ref 80–100)
MONOCYTES # BLD AUTO: 0.4 K/UL — SIGNIFICANT CHANGE UP (ref 0–0.9)
MONOCYTES NFR BLD AUTO: 2.2 % — SIGNIFICANT CHANGE UP (ref 2–14)
NEUTROPHILS # BLD AUTO: 15.5 K/UL — HIGH (ref 1.8–7.4)
NEUTROPHILS NFR BLD AUTO: 90.5 % — HIGH (ref 43–77)
PLATELET # BLD AUTO: 342 K/UL — SIGNIFICANT CHANGE UP (ref 150–400)
RBC # BLD: 3.53 M/UL — LOW (ref 3.8–5.2)
RBC # FLD: 19.5 % — HIGH (ref 10.3–14.5)
WBC # BLD: 17.2 K/UL — HIGH (ref 3.8–10.5)
WBC # FLD AUTO: 17.2 K/UL — HIGH (ref 3.8–10.5)

## 2017-12-19 DIAGNOSIS — Z51.11 ENCOUNTER FOR ANTINEOPLASTIC CHEMOTHERAPY: ICD-10-CM

## 2017-12-19 DIAGNOSIS — R11.2 NAUSEA WITH VOMITING, UNSPECIFIED: ICD-10-CM

## 2017-12-19 DIAGNOSIS — Z51.89 ENCOUNTER FOR OTHER SPECIFIED AFTERCARE: ICD-10-CM

## 2017-12-26 ENCOUNTER — APPOINTMENT (OUTPATIENT)
Dept: NEPHROLOGY | Facility: CLINIC | Age: 47
End: 2017-12-26
Payer: MEDICAID

## 2017-12-26 VITALS
WEIGHT: 152 LBS | HEIGHT: 62.28 IN | DIASTOLIC BLOOD PRESSURE: 78 MMHG | OXYGEN SATURATION: 99 % | BODY MASS INDEX: 27.62 KG/M2 | HEART RATE: 90 BPM | SYSTOLIC BLOOD PRESSURE: 121 MMHG

## 2017-12-26 PROCEDURE — 99214 OFFICE O/P EST MOD 30 MIN: CPT

## 2018-01-03 ENCOUNTER — RESULT REVIEW (OUTPATIENT)
Age: 48
End: 2018-01-03

## 2018-01-03 ENCOUNTER — LABORATORY RESULT (OUTPATIENT)
Age: 48
End: 2018-01-03

## 2018-01-03 ENCOUNTER — APPOINTMENT (OUTPATIENT)
Dept: HEMATOLOGY ONCOLOGY | Facility: CLINIC | Age: 48
End: 2018-01-03
Payer: MEDICAID

## 2018-01-03 VITALS
TEMPERATURE: 97.5 F | DIASTOLIC BLOOD PRESSURE: 83 MMHG | OXYGEN SATURATION: 100 % | WEIGHT: 157.63 LBS | BODY MASS INDEX: 28.57 KG/M2 | SYSTOLIC BLOOD PRESSURE: 136 MMHG | RESPIRATION RATE: 16 BRPM | HEART RATE: 86 BPM

## 2018-01-03 LAB
BASOPHILS # BLD AUTO: 0 K/UL — SIGNIFICANT CHANGE UP (ref 0–0.2)
BASOPHILS NFR BLD AUTO: 0.4 % — SIGNIFICANT CHANGE UP (ref 0–2)
EOSINOPHIL # BLD AUTO: 0.1 K/UL — SIGNIFICANT CHANGE UP (ref 0–0.5)
EOSINOPHIL NFR BLD AUTO: 0.6 % — SIGNIFICANT CHANGE UP (ref 0–6)
HCT VFR BLD CALC: 30.1 % — LOW (ref 34.5–45)
HGB BLD-MCNC: 10.1 G/DL — LOW (ref 11.5–15.5)
LYMPHOCYTES # BLD AUTO: 1.5 K/UL — SIGNIFICANT CHANGE UP (ref 1–3.3)
LYMPHOCYTES # BLD AUTO: 12.5 % — LOW (ref 13–44)
MCHC RBC-ENTMCNC: 28.1 PG — SIGNIFICANT CHANGE UP (ref 27–34)
MCHC RBC-ENTMCNC: 33.7 G/DL — SIGNIFICANT CHANGE UP (ref 32–36)
MCV RBC AUTO: 83.3 FL — SIGNIFICANT CHANGE UP (ref 80–100)
MONOCYTES # BLD AUTO: 0.7 K/UL — SIGNIFICANT CHANGE UP (ref 0–0.9)
MONOCYTES NFR BLD AUTO: 6.1 % — SIGNIFICANT CHANGE UP (ref 2–14)
NEUTROPHILS # BLD AUTO: 9.7 K/UL — HIGH (ref 1.8–7.4)
NEUTROPHILS NFR BLD AUTO: 80.4 % — HIGH (ref 43–77)
PLATELET # BLD AUTO: 213 K/UL — SIGNIFICANT CHANGE UP (ref 150–400)
RBC # BLD: 3.62 M/UL — LOW (ref 3.8–5.2)
RBC # FLD: 19.9 % — HIGH (ref 10.3–14.5)
WBC # BLD: 12 K/UL — HIGH (ref 3.8–10.5)
WBC # FLD AUTO: 12 K/UL — HIGH (ref 3.8–10.5)

## 2018-01-03 PROCEDURE — 99215 OFFICE O/P EST HI 40 MIN: CPT

## 2018-01-08 ENCOUNTER — APPOINTMENT (OUTPATIENT)
Dept: INFUSION THERAPY | Facility: HOSPITAL | Age: 48
End: 2018-01-08

## 2018-01-08 ENCOUNTER — RESULT REVIEW (OUTPATIENT)
Age: 48
End: 2018-01-08

## 2018-01-08 ENCOUNTER — LABORATORY RESULT (OUTPATIENT)
Age: 48
End: 2018-01-08

## 2018-01-08 LAB
ANISOCYTOSIS BLD QL: SLIGHT — SIGNIFICANT CHANGE UP
DACRYOCYTES BLD QL SMEAR: SLIGHT — SIGNIFICANT CHANGE UP
ELLIPTOCYTES BLD QL SMEAR: SLIGHT — SIGNIFICANT CHANGE UP
EOSINOPHIL # BLD AUTO: 0.1 K/UL — SIGNIFICANT CHANGE UP (ref 0–0.5)
HCT VFR BLD CALC: 31 % — LOW (ref 34.5–45)
HGB BLD-MCNC: 10.4 G/DL — LOW (ref 11.5–15.5)
HYPOCHROMIA BLD QL: SLIGHT — SIGNIFICANT CHANGE UP
LYMPHOCYTES # BLD AUTO: 1.2 K/UL — SIGNIFICANT CHANGE UP (ref 1–3.3)
LYMPHOCYTES # BLD AUTO: 9 % — LOW (ref 13–44)
MACROCYTES BLD QL: SLIGHT — SIGNIFICANT CHANGE UP
MCHC RBC-ENTMCNC: 28.7 PG — SIGNIFICANT CHANGE UP (ref 27–34)
MCHC RBC-ENTMCNC: 33.7 G/DL — SIGNIFICANT CHANGE UP (ref 32–36)
MCV RBC AUTO: 85.3 FL — SIGNIFICANT CHANGE UP (ref 80–100)
MICROCYTES BLD QL: SLIGHT — SIGNIFICANT CHANGE UP
MONOCYTES # BLD AUTO: 0.9 K/UL — SIGNIFICANT CHANGE UP (ref 0–0.9)
MONOCYTES NFR BLD AUTO: 8 % — SIGNIFICANT CHANGE UP (ref 2–14)
NEUTROPHILS # BLD AUTO: 20.4 K/UL — HIGH (ref 1.8–7.4)
NEUTROPHILS NFR BLD AUTO: 81 % — HIGH (ref 43–77)
NEUTS BAND # BLD: 2 % — SIGNIFICANT CHANGE UP (ref 0–8)
PLAT MORPH BLD: NORMAL — SIGNIFICANT CHANGE UP
PLATELET # BLD AUTO: 326 K/UL — SIGNIFICANT CHANGE UP (ref 150–400)
POIKILOCYTOSIS BLD QL AUTO: SLIGHT — SIGNIFICANT CHANGE UP
POLYCHROMASIA BLD QL SMEAR: SLIGHT — SIGNIFICANT CHANGE UP
RBC # BLD: 3.63 M/UL — LOW (ref 3.8–5.2)
RBC # FLD: 21.2 % — HIGH (ref 10.3–14.5)
RBC BLD AUTO: ABNORMAL
SCHISTOCYTES BLD QL AUTO: SLIGHT — SIGNIFICANT CHANGE UP
WBC # BLD: 22.5 K/UL — HIGH (ref 3.8–10.5)
WBC # FLD AUTO: 22.5 K/UL — HIGH (ref 3.8–10.5)

## 2018-01-19 ENCOUNTER — MOBILE ON CALL (OUTPATIENT)
Age: 48
End: 2018-01-19

## 2018-01-22 ENCOUNTER — OUTPATIENT (OUTPATIENT)
Dept: OUTPATIENT SERVICES | Facility: HOSPITAL | Age: 48
LOS: 1 days | Discharge: HOME | End: 2018-01-22

## 2018-01-22 ENCOUNTER — APPOINTMENT (OUTPATIENT)
Dept: SURGICAL ONCOLOGY | Facility: CLINIC | Age: 48
End: 2018-01-22
Payer: MEDICAID

## 2018-01-22 VITALS
BODY MASS INDEX: 28.89 KG/M2 | HEART RATE: 88 BPM | WEIGHT: 157 LBS | RESPIRATION RATE: 16 BRPM | SYSTOLIC BLOOD PRESSURE: 133 MMHG | OXYGEN SATURATION: 100 % | HEIGHT: 62 IN | DIASTOLIC BLOOD PRESSURE: 86 MMHG

## 2018-01-22 DIAGNOSIS — Z90.12 ACQUIRED ABSENCE OF LEFT BREAST AND NIPPLE: Chronic | ICD-10-CM

## 2018-01-22 DIAGNOSIS — C50.912 MALIGNANT NEOPLASM OF UNSPECIFIED SITE OF LEFT FEMALE BREAST: ICD-10-CM

## 2018-01-22 PROCEDURE — 99214 OFFICE O/P EST MOD 30 MIN: CPT

## 2018-01-24 ENCOUNTER — APPOINTMENT (OUTPATIENT)
Dept: HEMATOLOGY ONCOLOGY | Facility: CLINIC | Age: 48
End: 2018-01-24
Payer: MEDICAID

## 2018-01-24 VITALS
HEART RATE: 84 BPM | SYSTOLIC BLOOD PRESSURE: 127 MMHG | DIASTOLIC BLOOD PRESSURE: 82 MMHG | OXYGEN SATURATION: 100 % | WEIGHT: 156.53 LBS | BODY MASS INDEX: 28.63 KG/M2 | RESPIRATION RATE: 16 BRPM | TEMPERATURE: 98.1 F

## 2018-01-24 DIAGNOSIS — Z79.899 OTHER LONG TERM (CURRENT) DRUG THERAPY: ICD-10-CM

## 2018-01-24 PROCEDURE — 99215 OFFICE O/P EST HI 40 MIN: CPT

## 2018-01-24 RX ORDER — METOCLOPRAMIDE 10 MG/1
10 TABLET ORAL
Qty: 30 | Refills: 0 | Status: DISCONTINUED | COMMUNITY
Start: 2017-10-23 | End: 2018-01-24

## 2018-01-24 RX ORDER — DEXAMETHASONE 4 MG/1
4 TABLET ORAL TWICE DAILY
Qty: 24 | Refills: 0 | Status: DISCONTINUED | COMMUNITY
Start: 2017-10-23 | End: 2018-01-24

## 2018-01-26 ENCOUNTER — APPOINTMENT (OUTPATIENT)
Dept: PLASTIC SURGERY | Facility: CLINIC | Age: 48
End: 2018-01-26
Payer: MEDICAID

## 2018-01-26 PROCEDURE — 99213 OFFICE O/P EST LOW 20 MIN: CPT

## 2018-01-29 ENCOUNTER — APPOINTMENT (OUTPATIENT)
Dept: OBGYN | Facility: CLINIC | Age: 48
End: 2018-01-29
Payer: MEDICAID

## 2018-01-29 ENCOUNTER — OUTPATIENT (OUTPATIENT)
Dept: OUTPATIENT SERVICES | Facility: HOSPITAL | Age: 48
LOS: 1 days | End: 2018-01-29
Payer: MEDICAID

## 2018-01-29 ENCOUNTER — APPOINTMENT (OUTPATIENT)
Dept: INFUSION THERAPY | Facility: HOSPITAL | Age: 48
End: 2018-01-29

## 2018-01-29 ENCOUNTER — RESULT REVIEW (OUTPATIENT)
Age: 48
End: 2018-01-29

## 2018-01-29 VITALS
HEIGHT: 62 IN | SYSTOLIC BLOOD PRESSURE: 145 MMHG | DIASTOLIC BLOOD PRESSURE: 86 MMHG | BODY MASS INDEX: 28.71 KG/M2 | WEIGHT: 156 LBS

## 2018-01-29 DIAGNOSIS — Z00.00 ENCOUNTER FOR GENERAL ADULT MEDICAL EXAMINATION WITHOUT ABNORMAL FINDINGS: ICD-10-CM

## 2018-01-29 DIAGNOSIS — Z90.12 ACQUIRED ABSENCE OF LEFT BREAST AND NIPPLE: Chronic | ICD-10-CM

## 2018-01-29 DIAGNOSIS — R19.00 INTRA-ABDOMINAL AND PELVIC SWELLING, MASS AND LUMP, UNSPECIFIED SITE: ICD-10-CM

## 2018-01-29 PROCEDURE — 99203 OFFICE O/P NEW LOW 30 MIN: CPT

## 2018-01-29 RX ORDER — OMEPRAZOLE 40 MG/1
40 CAPSULE, DELAYED RELEASE ORAL
Qty: 30 | Refills: 4 | Status: DISCONTINUED | COMMUNITY
Start: 2017-11-13 | End: 2018-01-29

## 2018-01-29 RX ORDER — SILVER SULFADIAZINE 10 MG/G
1 CREAM TOPICAL DAILY
Qty: 10 | Refills: 0 | Status: DISCONTINUED | COMMUNITY
Start: 2017-10-12 | End: 2018-01-29

## 2018-01-30 LAB
C TRACH RRNA SPEC QL NAA+PROBE: SIGNIFICANT CHANGE UP
C TRACH+GC RRNA SPEC QL PROBE: SIGNIFICANT CHANGE UP
HPV HIGH+LOW RISK DNA PNL CVX: SIGNIFICANT CHANGE UP
N GONORRHOEA RRNA SPEC QL NAA+PROBE: SIGNIFICANT CHANGE UP

## 2018-01-30 PROCEDURE — G0463: CPT

## 2018-01-30 PROCEDURE — 87624 HPV HI-RISK TYP POOLED RSLT: CPT

## 2018-01-31 ENCOUNTER — FORM ENCOUNTER (OUTPATIENT)
Age: 48
End: 2018-01-31

## 2018-01-31 DIAGNOSIS — R19.00 INTRA-ABDOMINAL AND PELVIC SWELLING, MASS AND LUMP, UNSPECIFIED SITE: ICD-10-CM

## 2018-01-31 DIAGNOSIS — N95.1 MENOPAUSAL AND FEMALE CLIMACTERIC STATES: ICD-10-CM

## 2018-02-01 ENCOUNTER — APPOINTMENT (OUTPATIENT)
Dept: ULTRASOUND IMAGING | Facility: IMAGING CENTER | Age: 48
End: 2018-02-01
Payer: MEDICAID

## 2018-02-01 ENCOUNTER — OUTPATIENT (OUTPATIENT)
Dept: OUTPATIENT SERVICES | Facility: HOSPITAL | Age: 48
LOS: 1 days | End: 2018-02-01
Payer: MEDICAID

## 2018-02-01 DIAGNOSIS — C50.912 MALIGNANT NEOPLASM OF UNSPECIFIED SITE OF LEFT FEMALE BREAST: ICD-10-CM

## 2018-02-01 DIAGNOSIS — Z90.12 ACQUIRED ABSENCE OF LEFT BREAST AND NIPPLE: Chronic | ICD-10-CM

## 2018-02-01 LAB — CYTOLOGY SPEC DOC CYTO: SIGNIFICANT CHANGE UP

## 2018-02-01 PROCEDURE — 93971 EXTREMITY STUDY: CPT

## 2018-02-01 PROCEDURE — 93971 EXTREMITY STUDY: CPT | Mod: 26,RT

## 2018-02-02 ENCOUNTER — OUTPATIENT (OUTPATIENT)
Dept: OUTPATIENT SERVICES | Facility: HOSPITAL | Age: 48
LOS: 1 days | End: 2018-02-02
Payer: MEDICAID

## 2018-02-02 ENCOUNTER — APPOINTMENT (OUTPATIENT)
Dept: ULTRASOUND IMAGING | Facility: HOSPITAL | Age: 48
End: 2018-02-02
Payer: MEDICAID

## 2018-02-02 DIAGNOSIS — R19.00 INTRA-ABDOMINAL AND PELVIC SWELLING, MASS AND LUMP, UNSPECIFIED SITE: ICD-10-CM

## 2018-02-02 DIAGNOSIS — Z90.12 ACQUIRED ABSENCE OF LEFT BREAST AND NIPPLE: Chronic | ICD-10-CM

## 2018-02-02 PROCEDURE — 76830 TRANSVAGINAL US NON-OB: CPT | Mod: 26

## 2018-02-02 PROCEDURE — 76856 US EXAM PELVIC COMPLETE: CPT

## 2018-02-02 PROCEDURE — 76856 US EXAM PELVIC COMPLETE: CPT | Mod: 26

## 2018-02-02 PROCEDURE — 76830 TRANSVAGINAL US NON-OB: CPT

## 2018-02-05 ENCOUNTER — APPOINTMENT (OUTPATIENT)
Dept: HEMATOLOGY ONCOLOGY | Facility: CLINIC | Age: 48
End: 2018-02-05
Payer: SUBSIDIZED

## 2018-02-05 VITALS
DIASTOLIC BLOOD PRESSURE: 95 MMHG | HEART RATE: 84 BPM | SYSTOLIC BLOOD PRESSURE: 152 MMHG | TEMPERATURE: 98.2 F | OXYGEN SATURATION: 100 % | RESPIRATION RATE: 16 BRPM | HEIGHT: 62.13 IN | WEIGHT: 156.53 LBS | BODY MASS INDEX: 28.44 KG/M2

## 2018-02-05 PROCEDURE — 99215 OFFICE O/P EST HI 40 MIN: CPT | Mod: Q0

## 2018-02-14 ENCOUNTER — FORM ENCOUNTER (OUTPATIENT)
Age: 48
End: 2018-02-14

## 2018-02-15 ENCOUNTER — APPOINTMENT (OUTPATIENT)
Dept: ULTRASOUND IMAGING | Facility: IMAGING CENTER | Age: 48
End: 2018-02-15
Payer: MEDICAID

## 2018-02-15 ENCOUNTER — OUTPATIENT (OUTPATIENT)
Dept: OUTPATIENT SERVICES | Facility: HOSPITAL | Age: 48
LOS: 1 days | End: 2018-02-15
Payer: MEDICAID

## 2018-02-15 DIAGNOSIS — C50.912 MALIGNANT NEOPLASM OF UNSPECIFIED SITE OF LEFT FEMALE BREAST: ICD-10-CM

## 2018-02-15 DIAGNOSIS — Z90.12 ACQUIRED ABSENCE OF LEFT BREAST AND NIPPLE: Chronic | ICD-10-CM

## 2018-02-15 PROCEDURE — 93971 EXTREMITY STUDY: CPT

## 2018-02-15 PROCEDURE — 93971 EXTREMITY STUDY: CPT | Mod: 26,RT

## 2018-02-20 ENCOUNTER — CHART COPY (OUTPATIENT)
Age: 48
End: 2018-02-20

## 2018-02-21 ENCOUNTER — APPOINTMENT (OUTPATIENT)
Dept: INFUSION THERAPY | Facility: HOSPITAL | Age: 48
End: 2018-02-21

## 2018-02-26 ENCOUNTER — APPOINTMENT (OUTPATIENT)
Dept: OBGYN | Facility: CLINIC | Age: 48
End: 2018-02-26
Payer: MEDICAID

## 2018-02-26 ENCOUNTER — OUTPATIENT (OUTPATIENT)
Dept: OUTPATIENT SERVICES | Facility: HOSPITAL | Age: 48
LOS: 1 days | End: 2018-02-26
Payer: MEDICAID

## 2018-02-26 VITALS
BODY MASS INDEX: 28.52 KG/M2 | SYSTOLIC BLOOD PRESSURE: 127 MMHG | DIASTOLIC BLOOD PRESSURE: 73 MMHG | TEMPERATURE: 98.2 F | HEART RATE: 79 BPM | WEIGHT: 155 LBS | HEIGHT: 62 IN

## 2018-02-26 DIAGNOSIS — Z00.00 ENCOUNTER FOR GENERAL ADULT MEDICAL EXAMINATION WITHOUT ABNORMAL FINDINGS: ICD-10-CM

## 2018-02-26 DIAGNOSIS — Z90.12 ACQUIRED ABSENCE OF LEFT BREAST AND NIPPLE: Chronic | ICD-10-CM

## 2018-02-26 DIAGNOSIS — Z71.89 OTHER SPECIFIED COUNSELING: ICD-10-CM

## 2018-02-26 PROCEDURE — G0463: CPT

## 2018-02-26 PROCEDURE — 99212 OFFICE O/P EST SF 10 MIN: CPT

## 2018-02-28 DIAGNOSIS — Z71.89 OTHER SPECIFIED COUNSELING: ICD-10-CM

## 2018-02-28 DIAGNOSIS — N83.209 UNSPECIFIED OVARIAN CYST, UNSPECIFIED SIDE: ICD-10-CM

## 2018-03-16 ENCOUNTER — OUTPATIENT (OUTPATIENT)
Dept: OUTPATIENT SERVICES | Facility: HOSPITAL | Age: 48
LOS: 1 days | Discharge: HOME | End: 2018-03-16

## 2018-03-16 DIAGNOSIS — Z90.12 ACQUIRED ABSENCE OF LEFT BREAST AND NIPPLE: Chronic | ICD-10-CM

## 2018-03-16 DIAGNOSIS — C50.912 MALIGNANT NEOPLASM OF UNSPECIFIED SITE OF LEFT FEMALE BREAST: ICD-10-CM

## 2018-03-21 ENCOUNTER — APPOINTMENT (OUTPATIENT)
Dept: HEMATOLOGY ONCOLOGY | Facility: CLINIC | Age: 48
End: 2018-03-21

## 2018-03-26 ENCOUNTER — APPOINTMENT (OUTPATIENT)
Dept: INFUSION THERAPY | Facility: HOSPITAL | Age: 48
End: 2018-03-26

## 2018-03-26 ENCOUNTER — APPOINTMENT (OUTPATIENT)
Dept: HEMATOLOGY ONCOLOGY | Facility: CLINIC | Age: 48
End: 2018-03-26
Payer: MEDICAID

## 2018-03-26 VITALS
TEMPERATURE: 98.5 F | SYSTOLIC BLOOD PRESSURE: 151 MMHG | WEIGHT: 153.22 LBS | DIASTOLIC BLOOD PRESSURE: 92 MMHG | RESPIRATION RATE: 16 BRPM | OXYGEN SATURATION: 100 % | BODY MASS INDEX: 28.02 KG/M2 | HEART RATE: 75 BPM

## 2018-03-26 PROCEDURE — 99215 OFFICE O/P EST HI 40 MIN: CPT

## 2018-03-26 RX ORDER — TAMOXIFEN CITRATE 20 MG/1
20 TABLET, FILM COATED ORAL DAILY
Qty: 90 | Refills: 0 | Status: DISCONTINUED | COMMUNITY
Start: 2018-01-24 | End: 2018-03-26

## 2018-03-28 ENCOUNTER — OUTPATIENT (OUTPATIENT)
Dept: OUTPATIENT SERVICES | Facility: HOSPITAL | Age: 48
LOS: 1 days | End: 2018-03-28
Payer: MEDICAID

## 2018-03-28 ENCOUNTER — APPOINTMENT (OUTPATIENT)
Dept: ULTRASOUND IMAGING | Facility: HOSPITAL | Age: 48
End: 2018-03-28
Payer: MEDICAID

## 2018-03-28 DIAGNOSIS — R19.00 INTRA-ABDOMINAL AND PELVIC SWELLING, MASS AND LUMP, UNSPECIFIED SITE: ICD-10-CM

## 2018-03-28 DIAGNOSIS — N83.209 UNSPECIFIED OVARIAN CYST, UNSPECIFIED SIDE: ICD-10-CM

## 2018-03-28 DIAGNOSIS — Z90.12 ACQUIRED ABSENCE OF LEFT BREAST AND NIPPLE: Chronic | ICD-10-CM

## 2018-03-28 PROCEDURE — 76830 TRANSVAGINAL US NON-OB: CPT | Mod: 26

## 2018-03-28 PROCEDURE — 76856 US EXAM PELVIC COMPLETE: CPT | Mod: 26

## 2018-03-28 PROCEDURE — 76830 TRANSVAGINAL US NON-OB: CPT

## 2018-03-28 PROCEDURE — 76856 US EXAM PELVIC COMPLETE: CPT

## 2018-03-30 ENCOUNTER — APPOINTMENT (OUTPATIENT)
Dept: HEMATOLOGY ONCOLOGY | Facility: CLINIC | Age: 48
End: 2018-03-30

## 2018-04-02 ENCOUNTER — OUTPATIENT (OUTPATIENT)
Dept: OUTPATIENT SERVICES | Facility: HOSPITAL | Age: 48
LOS: 1 days | End: 2018-04-02
Payer: MEDICAID

## 2018-04-02 ENCOUNTER — APPOINTMENT (OUTPATIENT)
Dept: OBGYN | Facility: CLINIC | Age: 48
End: 2018-04-02
Payer: MEDICAID

## 2018-04-02 VITALS
HEIGHT: 62 IN | BODY MASS INDEX: 27.97 KG/M2 | HEART RATE: 76 BPM | WEIGHT: 152 LBS | DIASTOLIC BLOOD PRESSURE: 92 MMHG | SYSTOLIC BLOOD PRESSURE: 159 MMHG

## 2018-04-02 DIAGNOSIS — Z90.12 ACQUIRED ABSENCE OF LEFT BREAST AND NIPPLE: Chronic | ICD-10-CM

## 2018-04-02 DIAGNOSIS — N83.209 UNSPECIFIED OVARIAN CYST, UNSPECIFIED SIDE: ICD-10-CM

## 2018-04-02 DIAGNOSIS — Z00.00 ENCOUNTER FOR GENERAL ADULT MEDICAL EXAMINATION WITHOUT ABNORMAL FINDINGS: ICD-10-CM

## 2018-04-02 PROCEDURE — G0463: CPT

## 2018-04-02 PROCEDURE — 99213 OFFICE O/P EST LOW 20 MIN: CPT

## 2018-04-03 ENCOUNTER — APPOINTMENT (OUTPATIENT)
Dept: NEPHROLOGY | Facility: CLINIC | Age: 48
End: 2018-04-03
Payer: MEDICAID

## 2018-04-03 VITALS
WEIGHT: 150 LBS | DIASTOLIC BLOOD PRESSURE: 84 MMHG | OXYGEN SATURATION: 98 % | HEIGHT: 62 IN | SYSTOLIC BLOOD PRESSURE: 141 MMHG | HEART RATE: 77 BPM | BODY MASS INDEX: 27.6 KG/M2

## 2018-04-03 VITALS — DIASTOLIC BLOOD PRESSURE: 80 MMHG | SYSTOLIC BLOOD PRESSURE: 136 MMHG

## 2018-04-03 PROCEDURE — 99213 OFFICE O/P EST LOW 20 MIN: CPT

## 2018-04-04 DIAGNOSIS — N83.209 UNSPECIFIED OVARIAN CYST, UNSPECIFIED SIDE: ICD-10-CM

## 2018-04-13 ENCOUNTER — OUTPATIENT (OUTPATIENT)
Dept: OUTPATIENT SERVICES | Facility: HOSPITAL | Age: 48
LOS: 1 days | Discharge: HOME | End: 2018-04-13

## 2018-04-13 DIAGNOSIS — C50.912 MALIGNANT NEOPLASM OF UNSPECIFIED SITE OF LEFT FEMALE BREAST: ICD-10-CM

## 2018-04-13 DIAGNOSIS — Z90.12 ACQUIRED ABSENCE OF LEFT BREAST AND NIPPLE: Chronic | ICD-10-CM

## 2018-04-18 ENCOUNTER — APPOINTMENT (OUTPATIENT)
Dept: INFUSION THERAPY | Facility: HOSPITAL | Age: 48
End: 2018-04-18

## 2018-04-20 ENCOUNTER — APPOINTMENT (OUTPATIENT)
Dept: PLASTIC SURGERY | Facility: CLINIC | Age: 48
End: 2018-04-20
Payer: MEDICAID

## 2018-04-20 PROCEDURE — 99214 OFFICE O/P EST MOD 30 MIN: CPT

## 2018-04-25 ENCOUNTER — RESULT REVIEW (OUTPATIENT)
Age: 48
End: 2018-04-25

## 2018-04-25 ENCOUNTER — APPOINTMENT (OUTPATIENT)
Dept: HEMATOLOGY ONCOLOGY | Facility: CLINIC | Age: 48
End: 2018-04-25

## 2018-04-25 ENCOUNTER — APPOINTMENT (OUTPATIENT)
Dept: SURGICAL ONCOLOGY | Facility: CLINIC | Age: 48
End: 2018-04-25
Payer: MEDICAID

## 2018-04-25 ENCOUNTER — APPOINTMENT (OUTPATIENT)
Dept: INFUSION THERAPY | Facility: HOSPITAL | Age: 48
End: 2018-04-25

## 2018-04-25 VITALS
BODY MASS INDEX: 27.6 KG/M2 | DIASTOLIC BLOOD PRESSURE: 94 MMHG | HEIGHT: 62 IN | RESPIRATION RATE: 16 BRPM | OXYGEN SATURATION: 99 % | SYSTOLIC BLOOD PRESSURE: 172 MMHG | WEIGHT: 150 LBS | HEART RATE: 72 BPM

## 2018-04-25 LAB
BASOPHILS # BLD AUTO: 0.1 K/UL — SIGNIFICANT CHANGE UP (ref 0–0.2)
BASOPHILS NFR BLD AUTO: 0.9 % — SIGNIFICANT CHANGE UP (ref 0–2)
EOSINOPHIL # BLD AUTO: 0.3 K/UL — SIGNIFICANT CHANGE UP (ref 0–0.5)
EOSINOPHIL NFR BLD AUTO: 2.8 % — SIGNIFICANT CHANGE UP (ref 0–6)
HCT VFR BLD CALC: 36.6 % — SIGNIFICANT CHANGE UP (ref 34.5–45)
HGB BLD-MCNC: 12.6 G/DL — SIGNIFICANT CHANGE UP (ref 11.5–15.5)
LYMPHOCYTES # BLD AUTO: 29.6 % — SIGNIFICANT CHANGE UP (ref 13–44)
LYMPHOCYTES # BLD AUTO: 3 K/UL — SIGNIFICANT CHANGE UP (ref 1–3.3)
MCHC RBC-ENTMCNC: 29.7 PG — SIGNIFICANT CHANGE UP (ref 27–34)
MCHC RBC-ENTMCNC: 34.4 G/DL — SIGNIFICANT CHANGE UP (ref 32–36)
MCV RBC AUTO: 86.5 FL — SIGNIFICANT CHANGE UP (ref 80–100)
MONOCYTES # BLD AUTO: 0.6 K/UL — SIGNIFICANT CHANGE UP (ref 0–0.9)
MONOCYTES NFR BLD AUTO: 5.6 % — SIGNIFICANT CHANGE UP (ref 2–14)
NEUTROPHILS # BLD AUTO: 6.3 K/UL — SIGNIFICANT CHANGE UP (ref 1.8–7.4)
NEUTROPHILS NFR BLD AUTO: 61.1 % — SIGNIFICANT CHANGE UP (ref 43–77)
PLATELET # BLD AUTO: 242 K/UL — SIGNIFICANT CHANGE UP (ref 150–400)
RBC # BLD: 4.23 M/UL — SIGNIFICANT CHANGE UP (ref 3.8–5.2)
RBC # FLD: 12.1 % — SIGNIFICANT CHANGE UP (ref 10.3–14.5)
WBC # BLD: 10.3 K/UL — SIGNIFICANT CHANGE UP (ref 3.8–10.5)
WBC # FLD AUTO: 10.3 K/UL — SIGNIFICANT CHANGE UP (ref 3.8–10.5)

## 2018-04-25 PROCEDURE — 99214 OFFICE O/P EST MOD 30 MIN: CPT

## 2018-04-26 LAB
ALBUMIN SERPL ELPH-MCNC: 4.2 G/DL
ALP BLD-CCNC: 70 U/L
ALT SERPL-CCNC: 18 U/L
ANION GAP SERPL CALC-SCNC: 17 MMOL/L
AST SERPL-CCNC: 18 U/L
BILIRUB SERPL-MCNC: 0.4 MG/DL
BUN SERPL-MCNC: 15 MG/DL
CALCIUM SERPL-MCNC: 9.8 MG/DL
CHLORIDE SERPL-SCNC: 104 MMOL/L
CO2 SERPL-SCNC: 25 MMOL/L
CREAT SERPL-MCNC: 0.77 MG/DL
ESTRADIOL SERPL-MCNC: 8 PG/ML
FSH SERPL-MCNC: 2.8 IU/L
GLUCOSE SERPL-MCNC: 111 MG/DL
POTASSIUM SERPL-SCNC: 4 MMOL/L
PROT SERPL-MCNC: 7 G/DL
SODIUM SERPL-SCNC: 146 MMOL/L

## 2018-04-27 ENCOUNTER — APPOINTMENT (OUTPATIENT)
Dept: HEMATOLOGY ONCOLOGY | Facility: CLINIC | Age: 48
End: 2018-04-27
Payer: SUBSIDIZED

## 2018-04-27 VITALS
RESPIRATION RATE: 16 BRPM | WEIGHT: 151.46 LBS | OXYGEN SATURATION: 100 % | DIASTOLIC BLOOD PRESSURE: 91 MMHG | SYSTOLIC BLOOD PRESSURE: 162 MMHG | HEIGHT: 62.44 IN | BODY MASS INDEX: 27.17 KG/M2 | TEMPERATURE: 98.2 F | HEART RATE: 67 BPM

## 2018-04-27 PROCEDURE — 99215 OFFICE O/P EST HI 40 MIN: CPT

## 2018-05-01 ENCOUNTER — APPOINTMENT (OUTPATIENT)
Dept: VASCULAR SURGERY | Facility: CLINIC | Age: 48
End: 2018-05-01
Payer: MEDICAID

## 2018-05-01 VITALS
WEIGHT: 151 LBS | SYSTOLIC BLOOD PRESSURE: 164 MMHG | TEMPERATURE: 98 F | HEIGHT: 62.44 IN | BODY MASS INDEX: 27.09 KG/M2 | DIASTOLIC BLOOD PRESSURE: 97 MMHG

## 2018-05-01 PROCEDURE — 99213 OFFICE O/P EST LOW 20 MIN: CPT

## 2018-05-22 ENCOUNTER — OUTPATIENT (OUTPATIENT)
Dept: OUTPATIENT SERVICES | Facility: HOSPITAL | Age: 48
LOS: 1 days | Discharge: HOME | End: 2018-05-22

## 2018-05-22 DIAGNOSIS — C50.912 MALIGNANT NEOPLASM OF UNSPECIFIED SITE OF LEFT FEMALE BREAST: ICD-10-CM

## 2018-05-22 DIAGNOSIS — Z90.12 ACQUIRED ABSENCE OF LEFT BREAST AND NIPPLE: Chronic | ICD-10-CM

## 2018-05-25 ENCOUNTER — APPOINTMENT (OUTPATIENT)
Dept: HEMATOLOGY ONCOLOGY | Facility: CLINIC | Age: 48
End: 2018-05-25
Payer: MEDICAID

## 2018-05-25 ENCOUNTER — APPOINTMENT (OUTPATIENT)
Dept: INFUSION THERAPY | Facility: HOSPITAL | Age: 48
End: 2018-05-25

## 2018-05-25 VITALS
WEIGHT: 152.34 LBS | DIASTOLIC BLOOD PRESSURE: 98 MMHG | SYSTOLIC BLOOD PRESSURE: 158 MMHG | TEMPERATURE: 97.7 F | OXYGEN SATURATION: 100 % | HEART RATE: 68 BPM | RESPIRATION RATE: 16 BRPM | BODY MASS INDEX: 27.47 KG/M2

## 2018-05-25 PROCEDURE — 99215 OFFICE O/P EST HI 40 MIN: CPT

## 2018-05-30 ENCOUNTER — FORM ENCOUNTER (OUTPATIENT)
Age: 48
End: 2018-05-30

## 2018-05-31 ENCOUNTER — OUTPATIENT (OUTPATIENT)
Dept: OUTPATIENT SERVICES | Facility: HOSPITAL | Age: 48
LOS: 1 days | End: 2018-05-31
Payer: MEDICAID

## 2018-05-31 ENCOUNTER — APPOINTMENT (OUTPATIENT)
Dept: CT IMAGING | Facility: IMAGING CENTER | Age: 48
End: 2018-05-31
Payer: COMMERCIAL

## 2018-05-31 DIAGNOSIS — Z90.12 ACQUIRED ABSENCE OF LEFT BREAST AND NIPPLE: Chronic | ICD-10-CM

## 2018-05-31 DIAGNOSIS — I72.8 ANEURYSM OF OTHER SPECIFIED ARTERIES: ICD-10-CM

## 2018-05-31 PROCEDURE — 74175 CTA ABDOMEN W/CONTRAST: CPT

## 2018-05-31 PROCEDURE — 74175 CTA ABDOMEN W/CONTRAST: CPT | Mod: 26

## 2018-06-05 ENCOUNTER — APPOINTMENT (OUTPATIENT)
Dept: VASCULAR SURGERY | Facility: CLINIC | Age: 48
End: 2018-06-05
Payer: MEDICAID

## 2018-06-05 VITALS
BODY MASS INDEX: 27.27 KG/M2 | SYSTOLIC BLOOD PRESSURE: 172 MMHG | WEIGHT: 152 LBS | TEMPERATURE: 97.9 F | HEIGHT: 62.44 IN | HEART RATE: 78 BPM | DIASTOLIC BLOOD PRESSURE: 102 MMHG

## 2018-06-05 PROCEDURE — 99213 OFFICE O/P EST LOW 20 MIN: CPT

## 2018-06-11 ENCOUNTER — FORM ENCOUNTER (OUTPATIENT)
Age: 48
End: 2018-06-11

## 2018-06-12 ENCOUNTER — APPOINTMENT (OUTPATIENT)
Dept: RADIOLOGY | Facility: IMAGING CENTER | Age: 48
End: 2018-06-12
Payer: COMMERCIAL

## 2018-06-12 ENCOUNTER — OUTPATIENT (OUTPATIENT)
Dept: OUTPATIENT SERVICES | Facility: HOSPITAL | Age: 48
LOS: 1 days | End: 2018-06-12
Payer: MEDICAID

## 2018-06-12 DIAGNOSIS — Z90.12 ACQUIRED ABSENCE OF LEFT BREAST AND NIPPLE: Chronic | ICD-10-CM

## 2018-06-12 DIAGNOSIS — C50.912 MALIGNANT NEOPLASM OF UNSPECIFIED SITE OF LEFT FEMALE BREAST: ICD-10-CM

## 2018-06-12 PROCEDURE — 77080 DXA BONE DENSITY AXIAL: CPT | Mod: 26

## 2018-06-12 PROCEDURE — 77080 DXA BONE DENSITY AXIAL: CPT

## 2018-06-26 ENCOUNTER — OUTPATIENT (OUTPATIENT)
Dept: OUTPATIENT SERVICES | Facility: HOSPITAL | Age: 48
LOS: 1 days | Discharge: HOME | End: 2018-06-26

## 2018-06-26 DIAGNOSIS — Z90.12 ACQUIRED ABSENCE OF LEFT BREAST AND NIPPLE: Chronic | ICD-10-CM

## 2018-06-26 DIAGNOSIS — C50.912 MALIGNANT NEOPLASM OF UNSPECIFIED SITE OF LEFT FEMALE BREAST: ICD-10-CM

## 2018-06-29 ENCOUNTER — APPOINTMENT (OUTPATIENT)
Dept: HEMATOLOGY ONCOLOGY | Facility: CLINIC | Age: 48
End: 2018-06-29

## 2018-06-29 ENCOUNTER — APPOINTMENT (OUTPATIENT)
Dept: INFUSION THERAPY | Facility: HOSPITAL | Age: 48
End: 2018-06-29

## 2018-07-23 ENCOUNTER — OUTPATIENT (OUTPATIENT)
Dept: OUTPATIENT SERVICES | Facility: HOSPITAL | Age: 48
LOS: 1 days | Discharge: HOME | End: 2018-07-23

## 2018-07-23 DIAGNOSIS — Z90.12 ACQUIRED ABSENCE OF LEFT BREAST AND NIPPLE: Chronic | ICD-10-CM

## 2018-07-23 DIAGNOSIS — C50.912 MALIGNANT NEOPLASM OF UNSPECIFIED SITE OF LEFT FEMALE BREAST: ICD-10-CM

## 2018-07-23 PROBLEM — I10 ESSENTIAL (PRIMARY) HYPERTENSION: Chronic | Status: ACTIVE | Noted: 2017-09-18

## 2018-07-25 ENCOUNTER — APPOINTMENT (OUTPATIENT)
Dept: SURGICAL ONCOLOGY | Facility: CLINIC | Age: 48
End: 2018-07-25
Payer: MEDICAID

## 2018-07-25 VITALS
DIASTOLIC BLOOD PRESSURE: 89 MMHG | WEIGHT: 153 LBS | TEMPERATURE: 98.5 F | HEART RATE: 71 BPM | BODY MASS INDEX: 27.45 KG/M2 | SYSTOLIC BLOOD PRESSURE: 156 MMHG | HEIGHT: 62.44 IN

## 2018-07-25 PROCEDURE — 99214 OFFICE O/P EST MOD 30 MIN: CPT

## 2018-07-31 ENCOUNTER — APPOINTMENT (OUTPATIENT)
Dept: INFUSION THERAPY | Facility: HOSPITAL | Age: 48
End: 2018-07-31

## 2018-07-31 ENCOUNTER — OUTPATIENT (OUTPATIENT)
Dept: OUTPATIENT SERVICES | Facility: HOSPITAL | Age: 48
LOS: 1 days | End: 2018-07-31
Payer: MEDICAID

## 2018-07-31 ENCOUNTER — APPOINTMENT (OUTPATIENT)
Dept: MAMMOGRAPHY | Facility: IMAGING CENTER | Age: 48
End: 2018-07-31
Payer: MEDICAID

## 2018-07-31 ENCOUNTER — APPOINTMENT (OUTPATIENT)
Dept: ULTRASOUND IMAGING | Facility: IMAGING CENTER | Age: 48
End: 2018-07-31
Payer: MEDICAID

## 2018-07-31 DIAGNOSIS — Z90.12 ACQUIRED ABSENCE OF LEFT BREAST AND NIPPLE: Chronic | ICD-10-CM

## 2018-07-31 DIAGNOSIS — C50.912 MALIGNANT NEOPLASM OF UNSPECIFIED SITE OF LEFT FEMALE BREAST: ICD-10-CM

## 2018-07-31 PROCEDURE — G0279: CPT | Mod: 26

## 2018-07-31 PROCEDURE — 76641 ULTRASOUND BREAST COMPLETE: CPT | Mod: 26,RT

## 2018-07-31 PROCEDURE — G0279: CPT

## 2018-07-31 PROCEDURE — 76641 ULTRASOUND BREAST COMPLETE: CPT

## 2018-07-31 PROCEDURE — 77065 DX MAMMO INCL CAD UNI: CPT | Mod: 26,RT

## 2018-07-31 PROCEDURE — 77065 DX MAMMO INCL CAD UNI: CPT

## 2018-08-17 ENCOUNTER — OUTPATIENT (OUTPATIENT)
Dept: OUTPATIENT SERVICES | Facility: HOSPITAL | Age: 48
LOS: 1 days | Discharge: HOME | End: 2018-08-17

## 2018-08-17 DIAGNOSIS — Z90.12 ACQUIRED ABSENCE OF LEFT BREAST AND NIPPLE: Chronic | ICD-10-CM

## 2018-08-17 DIAGNOSIS — C50.912 MALIGNANT NEOPLASM OF UNSPECIFIED SITE OF LEFT FEMALE BREAST: ICD-10-CM

## 2018-08-27 ENCOUNTER — APPOINTMENT (OUTPATIENT)
Dept: INFUSION THERAPY | Facility: HOSPITAL | Age: 48
End: 2018-08-27

## 2018-08-27 ENCOUNTER — LABORATORY RESULT (OUTPATIENT)
Age: 48
End: 2018-08-27

## 2018-08-27 ENCOUNTER — APPOINTMENT (OUTPATIENT)
Dept: HEMATOLOGY ONCOLOGY | Facility: CLINIC | Age: 48
End: 2018-08-27
Payer: MEDICAID

## 2018-08-27 VITALS
DIASTOLIC BLOOD PRESSURE: 98 MMHG | HEART RATE: 69 BPM | WEIGHT: 149.91 LBS | BODY MASS INDEX: 27.03 KG/M2 | OXYGEN SATURATION: 99 % | TEMPERATURE: 98.3 F | RESPIRATION RATE: 16 BRPM | SYSTOLIC BLOOD PRESSURE: 164 MMHG

## 2018-08-27 PROCEDURE — 99215 OFFICE O/P EST HI 40 MIN: CPT

## 2018-09-17 ENCOUNTER — OUTPATIENT (OUTPATIENT)
Dept: OUTPATIENT SERVICES | Facility: HOSPITAL | Age: 48
LOS: 1 days | Discharge: HOME | End: 2018-09-17

## 2018-09-17 DIAGNOSIS — Z90.12 ACQUIRED ABSENCE OF LEFT BREAST AND NIPPLE: Chronic | ICD-10-CM

## 2018-09-17 DIAGNOSIS — C50.912 MALIGNANT NEOPLASM OF UNSPECIFIED SITE OF LEFT FEMALE BREAST: ICD-10-CM

## 2018-09-18 LAB
25(OH)D3 SERPL-MCNC: 26 NG/ML
ESTRADIOL SERPL-MCNC: <5 PG/ML
FSH SERPL-MCNC: 4.9 IU/L

## 2018-09-24 ENCOUNTER — APPOINTMENT (OUTPATIENT)
Dept: INFUSION THERAPY | Facility: HOSPITAL | Age: 48
End: 2018-09-24

## 2018-10-12 ENCOUNTER — OUTPATIENT (OUTPATIENT)
Dept: OUTPATIENT SERVICES | Facility: HOSPITAL | Age: 48
LOS: 1 days | Discharge: HOME | End: 2018-10-12

## 2018-10-12 DIAGNOSIS — Z90.12 ACQUIRED ABSENCE OF LEFT BREAST AND NIPPLE: Chronic | ICD-10-CM

## 2018-10-12 DIAGNOSIS — C50.912 MALIGNANT NEOPLASM OF UNSPECIFIED SITE OF LEFT FEMALE BREAST: ICD-10-CM

## 2018-10-22 ENCOUNTER — APPOINTMENT (OUTPATIENT)
Dept: INFUSION THERAPY | Facility: HOSPITAL | Age: 48
End: 2018-10-22

## 2018-10-29 ENCOUNTER — APPOINTMENT (OUTPATIENT)
Dept: SURGICAL ONCOLOGY | Facility: CLINIC | Age: 48
End: 2018-10-29
Payer: MEDICAID

## 2018-10-29 VITALS
DIASTOLIC BLOOD PRESSURE: 105 MMHG | HEART RATE: 96 BPM | RESPIRATION RATE: 16 BRPM | SYSTOLIC BLOOD PRESSURE: 161 MMHG | HEIGHT: 62 IN | WEIGHT: 152 LBS | BODY MASS INDEX: 27.97 KG/M2

## 2018-10-29 PROCEDURE — 99214 OFFICE O/P EST MOD 30 MIN: CPT

## 2018-11-07 ENCOUNTER — OUTPATIENT (OUTPATIENT)
Dept: OUTPATIENT SERVICES | Facility: HOSPITAL | Age: 48
LOS: 1 days | Discharge: HOME | End: 2018-11-07

## 2018-11-07 DIAGNOSIS — Z90.12 ACQUIRED ABSENCE OF LEFT BREAST AND NIPPLE: Chronic | ICD-10-CM

## 2018-11-07 DIAGNOSIS — C50.912 MALIGNANT NEOPLASM OF UNSPECIFIED SITE OF LEFT FEMALE BREAST: ICD-10-CM

## 2018-11-16 ENCOUNTER — APPOINTMENT (OUTPATIENT)
Dept: INFUSION THERAPY | Facility: HOSPITAL | Age: 48
End: 2018-11-16

## 2018-11-16 ENCOUNTER — APPOINTMENT (OUTPATIENT)
Dept: HEMATOLOGY ONCOLOGY | Facility: CLINIC | Age: 48
End: 2018-11-16

## 2018-11-26 ENCOUNTER — RESULT REVIEW (OUTPATIENT)
Age: 48
End: 2018-11-26

## 2018-11-26 ENCOUNTER — APPOINTMENT (OUTPATIENT)
Dept: HEMATOLOGY ONCOLOGY | Facility: CLINIC | Age: 48
End: 2018-11-26
Payer: MEDICAID

## 2018-11-26 VITALS
OXYGEN SATURATION: 100 % | DIASTOLIC BLOOD PRESSURE: 94 MMHG | BODY MASS INDEX: 27.98 KG/M2 | HEART RATE: 68 BPM | WEIGHT: 153 LBS | SYSTOLIC BLOOD PRESSURE: 169 MMHG | RESPIRATION RATE: 16 BRPM | TEMPERATURE: 98.2 F

## 2018-11-26 LAB
BASOPHILS # BLD AUTO: 0 K/UL — SIGNIFICANT CHANGE UP (ref 0–0.2)
BASOPHILS NFR BLD AUTO: 0.5 % — SIGNIFICANT CHANGE UP (ref 0–2)
EOSINOPHIL # BLD AUTO: 0.2 K/UL — SIGNIFICANT CHANGE UP (ref 0–0.5)
EOSINOPHIL NFR BLD AUTO: 2.1 % — SIGNIFICANT CHANGE UP (ref 0–6)
HCT VFR BLD CALC: 40.9 % — SIGNIFICANT CHANGE UP (ref 34.5–45)
HGB BLD-MCNC: 13.4 G/DL — SIGNIFICANT CHANGE UP (ref 11.5–15.5)
LYMPHOCYTES # BLD AUTO: 2.8 K/UL — SIGNIFICANT CHANGE UP (ref 1–3.3)
LYMPHOCYTES # BLD AUTO: 30.3 % — SIGNIFICANT CHANGE UP (ref 13–44)
MCHC RBC-ENTMCNC: 27.8 PG — SIGNIFICANT CHANGE UP (ref 27–34)
MCHC RBC-ENTMCNC: 32.8 G/DL — SIGNIFICANT CHANGE UP (ref 32–36)
MCV RBC AUTO: 84.7 FL — SIGNIFICANT CHANGE UP (ref 80–100)
MONOCYTES # BLD AUTO: 0.4 K/UL — SIGNIFICANT CHANGE UP (ref 0–0.9)
MONOCYTES NFR BLD AUTO: 4.6 % — SIGNIFICANT CHANGE UP (ref 2–14)
NEUTROPHILS # BLD AUTO: 5.7 K/UL — SIGNIFICANT CHANGE UP (ref 1.8–7.4)
NEUTROPHILS NFR BLD AUTO: 62.4 % — SIGNIFICANT CHANGE UP (ref 43–77)
PLATELET # BLD AUTO: 254 K/UL — SIGNIFICANT CHANGE UP (ref 150–400)
RBC # BLD: 4.83 M/UL — SIGNIFICANT CHANGE UP (ref 3.8–5.2)
RBC # FLD: 12.4 % — SIGNIFICANT CHANGE UP (ref 10.3–14.5)
WBC # BLD: 9.1 K/UL — SIGNIFICANT CHANGE UP (ref 3.8–10.5)
WBC # FLD AUTO: 9.1 K/UL — SIGNIFICANT CHANGE UP (ref 3.8–10.5)

## 2018-11-26 PROCEDURE — 99215 OFFICE O/P EST HI 40 MIN: CPT

## 2018-11-26 NOTE — OB HISTORY
[Definite:  ___ (Date)] : the last menstrual period was [unfilled] [Normal Amount/Duration] : was of a normal amount and duration [Regular Cycle Intervals] : periods have been regular [___] : Living: [unfilled]

## 2018-11-27 NOTE — CONSULT LETTER
[Dear  ___] : Dear  [unfilled], [Consult Letter:] : I had the pleasure of evaluating your patient, [unfilled]. [Please see my note below.] : Please see my note below. [Consult Closing:] : Thank you very much for allowing me to participate in the care of this patient.  If you have any questions, please do not hesitate to contact me. [Sincerely,] : Sincerely, [DrTorin  ___] : Dr. HOWARD [FreeTextEntry3] : Mercedes Bennett M.D.\par  of Medicine\par [facility]\par [facility] [place]\par [address] [place] [address]\par [address]\par Tele # [phone]; Fax [phone]

## 2018-11-27 NOTE — PHYSICAL EXAM
[Fully active, able to carry on all pre-disease performance without restriction] : Status 0 - Fully active, able to carry on all pre-disease performance without restriction [Normal] : affect appropriate [de-identified] : s/p left mastectomy with reconstruction. no CW or axillary nodule

## 2018-11-27 NOTE — ASSESSMENT
[Curative] : Goals of care discussed with patient: Curative [FreeTextEntry1] : This is a 47-year-old very pleasant premenopausal lady, who is diagnosed with stage IIB (T2, N1, M0) ER/[place] positive and HER-2/shaila negative left breast well to moderately differentiated invasive ductal carcinoma. She is status post left mastectomy and axillary lymph node dissection on 10/20/17. Intermediate oncotype. Completed TC x 4. BRCA NEGATIVE. Consented for PALLAS and on arm B C11 D1\par \par - Breast ca: Clinically, no evidence of disease. She is tolerating exemestane very well without significant side effects.  Reports good compliance. Plan to continue AI for total of 5-10 years. Continue lupron monthly. she will report vag bleeding spotting. Estradiol suppressed and FSH is low 8/2018. She is up to date with breast imaging. Follows with Dr Lees\par - Rec exercises for joint stiffness\par - Tolerable hot flashes. If worsen, will consider effexor. \par - Hair thinning- Likely 2/2 AI. Rec to start Biotin\par - Bone health: DeXA showed mild osteopenia. Continue calcium and vitamin D. \par - Non fasting lipid profile per pt request. HIgh TG 2/2 nonfasting sample. Rec to f/u with PCP\par -  better with gabapentin\par - Vit D suppls: Continue suppls. Check Vit D level today\par 	\par RTC 3m per protocol

## 2018-11-27 NOTE — HISTORY OF PRESENT ILLNESS
[Date: ____________] : Patient's last distress assessment performed on [unfilled]. [6 - Distress Level] : Distress Level: 6 [Patient given social work contact information and resource sheet] : Patient was given social work contact information and resource sheet [IIB] : IIB [de-identified] : This is a very pleasant 48- year-old premenopausal lady who was diagnosed with breast cancer and is here for a consultation today. Her oncologic history is follows:\par \par She underwent routine breast imaging on 5/4/17 which showed a spiculated lesion measuring 2.2 x 2.9 cm in the upper outer left breast. She underwent left breast 2:00 lesion core biopsy on 6/9/17 which showed invasive well-differentiated ductal carcinoma, Ej score 5/9, measuring 1.5 cm, ER 95%, WY 10% HER-2/shaila negative. She underwent a breast MRI on 7/15/17 which showed additional concerning areas for which biopsy was recommended. She underwent left breast 3:00 MRI biopsy on 7/18/17 which showed invasive well-differentiated ductal carcinoma, measuring 0.6 cm, Ej score 5/9 ER more than 90%, WY more than 90% HER-2/shaila negative. \par \par She underwent left mastectomy and left sentinel axillary lymph node dissection on 9/28/17 which showed invasive moderately differentiated ductal carcinoma, [place] grade 2/3, measuring 4 cm, 0.3 cm from the margin. Lymphovascular invasion was noted. Low/intermediate grade DCIS was noted. One sentinel lymph node was positive for metastasis measuring 0.3 cm. 10 additional lymph nodes were negative.\par \par s/p TC x4 completed in January.2018\par superficial phlebitis in the right arm after cycle 4 chemo. No s/f thrombus felt but noted to have focal superficial thrombosis on sono.therefore started on OS+AI instead of tamoxifen [de-identified] : \par GENETICS: CANCERNEXT AMBRY PANEL NEGATIVE \par  [FreeTextEntry1] : lupron started 2/2018\par exemestane 3/2018 [de-identified] : Ms. OSKAR SHARMA  is here for a follow up for left breast cancer on Lupron since 2/2018, and Exemestane since 3/2018.  \par Post mastectomy pain improved with  gabapentin.\par Reports stiffness in hip and knees, feels pain on sitting too long or bending, doesn't need pain meds- same as last visit\par LMP 11/15/17, not sexually active. tolerating Lupron monthly. no vag spotting\par Takes exemestane daily, good compliance\par no new aches/pain, + tolerable  hot flashes, no vag dryness, no excessive fatigue. Working full time\par mammogram with Dr Lees July 2018, BIRADS 3, f/u in 1 yr\par Dexa 6/2018 -1.2 Osteopenia.\par active, no change in energy, wt or appetite\par cholesterol f/b PMD- well controlled\par reports mild hair loss

## 2018-11-28 LAB — 25(OH)D3 SERPL-MCNC: 41.4 NG/ML

## 2018-12-10 ENCOUNTER — OUTPATIENT (OUTPATIENT)
Dept: OUTPATIENT SERVICES | Facility: HOSPITAL | Age: 48
LOS: 1 days | Discharge: HOME | End: 2018-12-10

## 2018-12-10 DIAGNOSIS — Z90.12 ACQUIRED ABSENCE OF LEFT BREAST AND NIPPLE: Chronic | ICD-10-CM

## 2018-12-10 DIAGNOSIS — C50.912 MALIGNANT NEOPLASM OF UNSPECIFIED SITE OF LEFT FEMALE BREAST: ICD-10-CM

## 2018-12-14 ENCOUNTER — APPOINTMENT (OUTPATIENT)
Dept: NEPHROLOGY | Facility: CLINIC | Age: 48
End: 2018-12-14
Payer: MEDICAID

## 2018-12-14 ENCOUNTER — APPOINTMENT (OUTPATIENT)
Dept: INFUSION THERAPY | Facility: HOSPITAL | Age: 48
End: 2018-12-14

## 2018-12-14 VITALS
OXYGEN SATURATION: 99 % | WEIGHT: 154.32 LBS | SYSTOLIC BLOOD PRESSURE: 159 MMHG | BODY MASS INDEX: 28.4 KG/M2 | DIASTOLIC BLOOD PRESSURE: 106 MMHG | HEIGHT: 62 IN | HEART RATE: 74 BPM

## 2018-12-14 VITALS — SYSTOLIC BLOOD PRESSURE: 156 MMHG | DIASTOLIC BLOOD PRESSURE: 100 MMHG | HEART RATE: 70 BPM

## 2018-12-14 PROCEDURE — 99214 OFFICE O/P EST MOD 30 MIN: CPT

## 2019-01-08 ENCOUNTER — OUTPATIENT (OUTPATIENT)
Dept: OUTPATIENT SERVICES | Facility: HOSPITAL | Age: 49
LOS: 1 days | Discharge: HOME | End: 2019-01-08

## 2019-01-08 DIAGNOSIS — Z90.12 ACQUIRED ABSENCE OF LEFT BREAST AND NIPPLE: Chronic | ICD-10-CM

## 2019-01-08 DIAGNOSIS — C50.912 MALIGNANT NEOPLASM OF UNSPECIFIED SITE OF LEFT FEMALE BREAST: ICD-10-CM

## 2019-01-11 ENCOUNTER — APPOINTMENT (OUTPATIENT)
Dept: INFUSION THERAPY | Facility: HOSPITAL | Age: 49
End: 2019-01-11

## 2019-02-07 ENCOUNTER — APPOINTMENT (OUTPATIENT)
Dept: INFUSION THERAPY | Facility: HOSPITAL | Age: 49
End: 2019-02-07

## 2019-02-11 ENCOUNTER — APPOINTMENT (OUTPATIENT)
Dept: SURGICAL ONCOLOGY | Facility: CLINIC | Age: 49
End: 2019-02-11
Payer: MEDICAID

## 2019-02-11 VITALS
WEIGHT: 152 LBS | HEART RATE: 70 BPM | HEIGHT: 62 IN | BODY MASS INDEX: 27.97 KG/M2 | DIASTOLIC BLOOD PRESSURE: 101 MMHG | RESPIRATION RATE: 15 BRPM | SYSTOLIC BLOOD PRESSURE: 161 MMHG

## 2019-02-11 PROCEDURE — 99214 OFFICE O/P EST MOD 30 MIN: CPT

## 2019-02-11 NOTE — ASSESSMENT
[FreeTextEntry1] : IMP:\par YAHAIRA - hx invasive ductal carcinoma left breast s/p left mastectomy 9/2017\par On Lupron and exemestane with Dr. Bennett\par \par \par PLAN:\par RTO q 4 months\par Bloodwork per Dr. Bennett\par Right mammogram/sonogram 7/2019 (script in computer)\par

## 2019-02-11 NOTE — HISTORY OF PRESENT ILLNESS
[de-identified] : Mile is a 48 year old female who presents for her breast cancer follow up.\par \par She is s/p left modified radical mastectomy with SE reconstruction (Dr. Acevedo) on 9/28/17. She returned to the OR on 9/30/17 for an evacuation of a hematoma/seroma. \par \par Final pathology:\par 1. Left SLN excision: one sentinel lymph node containing a subcapsular metastatic deposit of mammary carcinoma measuring 0.3 cm, 1/1, pN1a. \par \par 2. Left mastectomy: Invasive moderately differentiated ductal carcinoma, Ej histologic grade 2 of 3, score 6/9, invasive carcinoma measures 4 cm, pT2. Foci consistent with lymphovascular invasion are identified. DCIS, low to intermediate nuclear grade, cribriform and micropapillary type, a few foci associated with invasive carcinoma. Skin with areola and nipple were negative for malignancy. Negative margins. \par \par 3. Left axillary dissection: 0 out of 9 lymph nodes negative for metastatic carcinoma. (0/9)\par \par 4. Left internal mammary node. 1 lymph node negative for metastatic carcinoma (0/1)\par \par Completed chemotherapy under the care of Dr. Bennett 1/8/18.  Is currently on Lupron monthly and has recently started Exemestane 25mg without complaints.\par \par Right mammogram/sonogram 7/31/18 - IMPRESSION:  Essentially stable to slightly diminished hypoechoic nodule in the right  breast at 9:00, 5 cm from the nipple, measuring 6 x 6 x 5 mm and  previously measuring 8 x 5 x 6 mm in May 2017. Recommend additional  one-year ultrasound follow-up to confirm 2 years of stability.  RECOMMENDATION:  Mammography and ultrasound in one year  BI-RADS 3-Probably Benign Finding(s)\par \par Denies palpable masses, nipple discharge, skin changes, inversion of Right breast.  Denies any Left chest wall masses.   \par   \par \par Pertinent hx:\par 46 year-old female presents for initial consultation, referred by Dr. Sheba Betancourt. She was recalled from screening breast imaging in May 2017, at which time a diagnostic LEFT-sided mammogram revealed a 2.9 spiculated asymmetry in the left breast corresponding to a hypoechoic nodule on sonogram at 3:00, 8 cmfn. Biopsy of the finding was consistent with invasive ductal carcinoma. ER+/AR+/HER2(-). Oncotype 19.

## 2019-02-11 NOTE — PHYSICAL EXAM
[Normal] : supple, no neck mass and thyroid not enlarged [Normal Supraclavicular Lymph Nodes] : normal supraclavicular lymph nodes [Normal Axillary Lymph Nodes] : normal axillary lymph nodes [Normal] : oriented to person, place and time, with appropriate affect [de-identified] : Well healed Left breast SE flap. Right breast with fibrocystic changes only. No masses noted.

## 2019-02-15 ENCOUNTER — OUTPATIENT (OUTPATIENT)
Dept: OUTPATIENT SERVICES | Facility: HOSPITAL | Age: 49
LOS: 1 days | Discharge: HOME | End: 2019-02-15

## 2019-02-15 DIAGNOSIS — C50.912 MALIGNANT NEOPLASM OF UNSPECIFIED SITE OF LEFT FEMALE BREAST: ICD-10-CM

## 2019-02-15 DIAGNOSIS — Z90.12 ACQUIRED ABSENCE OF LEFT BREAST AND NIPPLE: Chronic | ICD-10-CM

## 2019-02-25 ENCOUNTER — APPOINTMENT (OUTPATIENT)
Dept: HEMATOLOGY ONCOLOGY | Facility: CLINIC | Age: 49
End: 2019-02-25
Payer: MEDICAID

## 2019-02-25 VITALS
HEART RATE: 66 BPM | DIASTOLIC BLOOD PRESSURE: 79 MMHG | SYSTOLIC BLOOD PRESSURE: 171 MMHG | RESPIRATION RATE: 16 BRPM | OXYGEN SATURATION: 100 % | BODY MASS INDEX: 27.7 KG/M2 | WEIGHT: 151.46 LBS | TEMPERATURE: 98.3 F

## 2019-02-25 PROCEDURE — 99214 OFFICE O/P EST MOD 30 MIN: CPT

## 2019-02-25 NOTE — CONSULT LETTER
[Dear  ___] : Dear  [unfilled], [Consult Letter:] : I had the pleasure of evaluating your patient, [unfilled]. [Please see my note below.] : Please see my note below. [Consult Closing:] : Thank you very much for allowing me to participate in the care of this patient.  If you have any questions, please do not hesitate to contact me. [Sincerely,] : Sincerely, [DrTorin  ___] : Dr. HOWARD [FreeTextEntry3] : Mercedes Bennett M.D.\par  of Medicine\par [facility]\par [facility] [place]\par [address] [place] [address]\par [address]\par Tele # [phone]; Fax [phone]

## 2019-02-25 NOTE — REASON FOR VISIT
[Follow-Up Visit] : a follow-up [Other: _____] : [unfilled] [FreeTextEntry2] : breast cancer ER/[place] +  HER-2 -

## 2019-02-25 NOTE — HISTORY OF PRESENT ILLNESS
[Date: ____________] : Patient's last distress assessment performed on [unfilled]. [6 - Distress Level] : Distress Level: 6 [Patient given social work contact information and resource sheet] : Patient was given social work contact information and resource sheet [IIB] : IIB [de-identified] : This is a very pleasant 48- year-old premenopausal lady who was diagnosed with breast cancer and is here for a consultation today. Her oncologic history is follows:\par \par She underwent routine breast imaging on 5/4/17 which showed a spiculated lesion measuring 2.2 x 2.9 cm in the upper outer left breast. She underwent left breast 2:00 lesion core biopsy on 6/9/17 which showed invasive well-differentiated ductal carcinoma, Ej score 5/9, measuring 1.5 cm, ER 95%, CT 10% HER-2/shaila negative. She underwent a breast MRI on 7/15/17 which showed additional concerning areas for which biopsy was recommended. She underwent left breast 3:00 MRI biopsy on 7/18/17 which showed invasive well-differentiated ductal carcinoma, measuring 0.6 cm, Ej score 5/9 ER more than 90%, CT more than 90% HER-2/shaila negative. \par \par She underwent left mastectomy and left sentinel axillary lymph node dissection on 9/28/17 which showed invasive moderately differentiated ductal carcinoma, [place] grade 2/3, measuring 4 cm, 0.3 cm from the margin. Lymphovascular invasion was noted. Low/intermediate grade DCIS was noted. One sentinel lymph node was positive for metastasis measuring 0.3 cm. 10 additional lymph nodes were negative.\par \par s/p TC x4 completed in January.2018\par superficial phlebitis in the right arm after cycle 4 chemo. No s/f thrombus felt but noted to have focal superficial thrombosis on sono.therefore started on OS+AI instead of tamoxifen [de-identified] : \par GENETICS: CANCERNEXT AMBRY PANEL NEGATIVE \par  [FreeTextEntry1] : lupron started 2/2018\par exemestane 3/2018 [de-identified] : Ms. OSKAR SHARMA  is here for a follow up for left breast cancer on Lupron since 2/2018, and Exemestane since 3/2018.  \par Takes exemestane daily, good compliance. No new aches/pain, + mild tolerable  hot flashes, no vag dryness, no excessive fatigue. Hair loss stable with biotin. She is active, no change in energy, wt or appetite. Working full time and exercising daily. LMP 11/15/17, not sexually active. Tolerating Lupron monthly. no vag spotting. Post mastectomy pain improved with  gabapentin. Reports stiffness in hip and knees, feels pain on sitting too long or bending, doesn't need pain meds- same as last visit\par mammogram with Dr Lees July 2018, BIRADS 3, f/u in 1 yr\par Dexa 6/2018 -1.2 Osteopenia.\par

## 2019-02-25 NOTE — ASSESSMENT
[Curative] : Goals of care discussed with patient: Curative [FreeTextEntry1] : This is a 47-year-old very pleasant premenopausal lady, who is diagnosed with stage IIB (T2, N1, M0) ER/[place] positive and HER-2/shaila negative left breast well to moderately differentiated invasive ductal carcinoma. She is status post left mastectomy and axillary lymph node dissection on 10/20/17. Intermediate oncotype. Completed TC x 4. BRCA NEGATIVE. Consented for PALLAS and on arm B C14 D1\par \par - Breast ca: Clinically, no evidence of disease. She is tolerating exemestane very well without significant side effects.  Reports good compliance. Plan to continue AI for total of 5-10 years. Continue lupron monthly. she will report vag bleeding spotting. Estradiol suppressed and FSH is low 8/2018. Repeat level today. She is up to date with breast imaging. Follows with Dr Lees\par - Aromatase inhibitor induced arthralgia: Mild and tolerable. Rec stretching exercises, physical therapy and ortho f/u\par - Mild hot flashes: 2/2 exemestane. Advised to wear layers and use fan prn. Consider Effexor if get worse.\par - Hair thinning- Likely 2/2 AI. Rec to start Biotin\par - Osteopenia: concern for worsening bone density due to exemestane. Rec to  continue calcium and vit D. DEXA 1-2 yrs.\par - Non fasting lipid profile per pt request. HIgh TG 2/2 nonfasting sample. Continue simvastatin. \par -  better with gabapentin\par - Vit D suppls: Continue suppls. Check Vit D level today\par 	\par RTC 3m per protocol

## 2019-02-27 ENCOUNTER — APPOINTMENT (OUTPATIENT)
Dept: ULTRASOUND IMAGING | Facility: IMAGING CENTER | Age: 49
End: 2019-02-27

## 2019-03-07 ENCOUNTER — APPOINTMENT (OUTPATIENT)
Dept: INFUSION THERAPY | Facility: HOSPITAL | Age: 49
End: 2019-03-07

## 2019-03-13 ENCOUNTER — TRANSCRIPTION ENCOUNTER (OUTPATIENT)
Age: 49
End: 2019-03-13

## 2019-03-14 LAB
ESTRADIOL SERPL-MCNC: 7 PG/ML
FSH SERPL-MCNC: 5.3 IU/L

## 2019-03-25 ENCOUNTER — RX RENEWAL (OUTPATIENT)
Age: 49
End: 2019-03-25

## 2019-03-26 ENCOUNTER — OUTPATIENT (OUTPATIENT)
Dept: OUTPATIENT SERVICES | Facility: HOSPITAL | Age: 49
LOS: 1 days | Discharge: HOME | End: 2019-03-26

## 2019-03-26 DIAGNOSIS — Z90.12 ACQUIRED ABSENCE OF LEFT BREAST AND NIPPLE: Chronic | ICD-10-CM

## 2019-03-26 DIAGNOSIS — C50.912 MALIGNANT NEOPLASM OF UNSPECIFIED SITE OF LEFT FEMALE BREAST: ICD-10-CM

## 2019-04-04 ENCOUNTER — APPOINTMENT (OUTPATIENT)
Dept: INFUSION THERAPY | Facility: HOSPITAL | Age: 49
End: 2019-04-04

## 2019-04-29 ENCOUNTER — OUTPATIENT (OUTPATIENT)
Dept: OUTPATIENT SERVICES | Facility: HOSPITAL | Age: 49
LOS: 1 days | Discharge: HOME | End: 2019-04-29

## 2019-04-29 DIAGNOSIS — Z90.12 ACQUIRED ABSENCE OF LEFT BREAST AND NIPPLE: Chronic | ICD-10-CM

## 2019-04-29 DIAGNOSIS — C50.912 MALIGNANT NEOPLASM OF UNSPECIFIED SITE OF LEFT FEMALE BREAST: ICD-10-CM

## 2019-05-02 ENCOUNTER — APPOINTMENT (OUTPATIENT)
Dept: INFUSION THERAPY | Facility: HOSPITAL | Age: 49
End: 2019-05-02

## 2019-05-20 ENCOUNTER — APPOINTMENT (OUTPATIENT)
Dept: HEMATOLOGY ONCOLOGY | Facility: CLINIC | Age: 49
End: 2019-05-20
Payer: MEDICAID

## 2019-05-20 VITALS
DIASTOLIC BLOOD PRESSURE: 83 MMHG | OXYGEN SATURATION: 98 % | WEIGHT: 149.89 LBS | HEART RATE: 82 BPM | SYSTOLIC BLOOD PRESSURE: 169 MMHG | BODY MASS INDEX: 27.42 KG/M2 | TEMPERATURE: 98.4 F | RESPIRATION RATE: 18 BRPM

## 2019-05-20 PROCEDURE — 99214 OFFICE O/P EST MOD 30 MIN: CPT

## 2019-05-24 ENCOUNTER — OUTPATIENT (OUTPATIENT)
Dept: OUTPATIENT SERVICES | Facility: HOSPITAL | Age: 49
LOS: 1 days | Discharge: HOME | End: 2019-05-24

## 2019-05-24 DIAGNOSIS — C50.912 MALIGNANT NEOPLASM OF UNSPECIFIED SITE OF LEFT FEMALE BREAST: ICD-10-CM

## 2019-05-24 DIAGNOSIS — Z90.12 ACQUIRED ABSENCE OF LEFT BREAST AND NIPPLE: Chronic | ICD-10-CM

## 2019-05-26 NOTE — ASSESSMENT
[Curative] : Goals of care discussed with patient: Curative [FreeTextEntry1] : This is a 48-year-old very pleasant premenopausal lady, who is diagnosed with stage IIB (T2, N1, M0) ER/[place] positive and HER-2/shaila negative left breast well to moderately differentiated invasive ductal carcinoma. She is status post left mastectomy and axillary lymph node dissection on 10/20/17. Intermediate oncotype. Completed TC x 4. BRCA NEGATIVE. Consented for PALLAS and on arm B C14 D1\par \par - Breast ca: Clinically, no evidence of disease. She is tolerating exemestane very well without significant side effects.  Reports good compliance. Plan to continue AI for total of 5-10 years. Continue lupron monthly. she will report vag bleeding spotting. Estradiol suppressed and FSH is low 5/2019.  She is up to date with breast imaging. Follows with Dr Lees\par - Aromatase inhibitor induced arthralgia: Mild and tolerable. Rec stretching exercises, physical therapy and ortho f/u\par - Mild hot flashes: 2/2 exemestane. Advised to wear layers and use fan prn. Consider Effexor if get worse.\par - Hair thinning- Likely 2/2 AI. Rec to start Biotin\par - Osteopenia: concern for worsening bone density due to exemestane. Rec to  continue calcium and vit D. DEXA 1-2 yrs.\par - High cholesterol: concern for worsening cholesterol due to AI. Pt is on zocor. Lipid profile annually.\par - Low Vitamin D: concern for worsening bone loss due to exemestane and low vit D. Discussed to increase Vit D intake\par - Sore throat: no fever. IMPROVING \par - vAG DISCHARGE: see gyn\par -  better with gabapentin\par 	\par RTC 3m per protocol

## 2019-05-26 NOTE — PHYSICAL EXAM
[Fully active, able to carry on all pre-disease performance without restriction] : Status 0 - Fully active, able to carry on all pre-disease performance without restriction [Normal] : affect appropriate [de-identified] : s/p left mastectomy with reconstruction. no CW or axillary nodule. No abnormalities in the right breast  [de-identified] : mild conjunctival injection

## 2019-05-26 NOTE — CONSULT LETTER
[Dear  ___] : Dear  [unfilled], [Consult Letter:] : I had the pleasure of evaluating your patient, [unfilled]. [Please see my note below.] : Please see my note below. [Consult Closing:] : Thank you very much for allowing me to participate in the care of this patient.  If you have any questions, please do not hesitate to contact me. [Sincerely,] : Sincerely, [DrTorin  ___] : Dr. HOWARD [FreeTextEntry3] : Mercedes Bennett M.D.\par  of Medicine\par [facility]\par [facility]\par [address] [place] [address]\par [address]\par Tele # [phone]; Fax [phone]

## 2019-05-26 NOTE — HISTORY OF PRESENT ILLNESS
[Date: ____________] : Patient's last distress assessment performed on [unfilled]. [6 - Distress Level] : Distress Level: 6 [Patient given social work contact information and resource sheet] : Patient was given social work contact information and resource sheet [IIB] : IIB [de-identified] : This is a very pleasant 48- year-old premenopausal lady who was diagnosed with breast cancer and is here for a consultation today. Her oncologic history is follows:\par \par She underwent routine breast imaging on 5/4/17 which showed a spiculated lesion measuring 2.2 x 2.9 cm in the upper outer left breast. She underwent left breast 2:00 lesion core biopsy on 6/9/17 which showed invasive well-differentiated ductal carcinoma, Ej score 5/9, measuring 1.5 cm, ER 95%, [place] 10% HER-2/shaila negative. She underwent a breast MRI on 7/15/17 which showed additional concerning areas for which biopsy was recommended. She underwent left breast 3:00 MRI biopsy on 7/18/17 which showed invasive well-differentiated ductal carcinoma, measuring 0.6 cm, Ej score 5/9 ER more than 90%, NC more than 90% HER-2/shaila negative. \par \par She underwent left mastectomy and left sentinel axillary lymph node dissection on 9/28/17 which showed invasive moderately differentiated ductal carcinoma, Ennice grade 2/3, measuring 4 cm, 0.3 cm from the margin. Lymphovascular invasion was noted. Low/intermediate grade DCIS was noted. One sentinel lymph node was positive for metastasis measuring 0.3 cm. 10 additional lymph nodes were negative.\par \par s/p TC x4 completed in January.2018\par superficial phlebitis in the right arm after cycle 4 chemo. No s/f thrombus felt but noted to have focal superficial thrombosis on sono.therefore started on OS+AI instead of tamoxifen [de-identified] : \par GENETICS: CANCERNEXT AMBRY PANEL NEGATIVE \par  [de-identified] : Ms. OSKAR SHARMA  is here for a follow up for left breast cancer on Lupron since 2/2018, and Exemestane since 3/2018.  \par Takes exemestane daily, good compliance. She reports stiffness in hip and knees, feels pain on sitting too long or bending, doesn't need pain meds- same as last visit. No new aches/pain, + mild tolerable  hot flashes, no vag dryness, no excessive fatigue. Hair loss stable with biotin. She is active, no change in energy, wt or appetite. Working full time and exercising daily. LMP 11/15/17, not sexually active. Tolerating Lupron monthly. no vag spotting. Post mastectomy pain improved with  gabapentin\par mammogram with Dr Lees July 2018, BIRADS 3, f/u in 1 yr\par Dexa 6/2018 -1.2 Osteopenia. ShE TAKES CA+ VIT d\par \par She has pink eye, headaches, no nasal discharge or fever. Using eye drops and sore throat spray. Strep throat was negative. She was seen in urgent care this weekend. \par She has thin yellow vaginal discharge. No itching. No bleeding. Rec to see GYN\par  [FreeTextEntry1] : lupron started 2/2018\par exemestane 3/2018

## 2019-05-30 ENCOUNTER — APPOINTMENT (OUTPATIENT)
Dept: INFUSION THERAPY | Facility: HOSPITAL | Age: 49
End: 2019-05-30

## 2019-06-14 ENCOUNTER — APPOINTMENT (OUTPATIENT)
Dept: NEPHROLOGY | Facility: CLINIC | Age: 49
End: 2019-06-14
Payer: MEDICAID

## 2019-06-14 VITALS
HEART RATE: 80 BPM | BODY MASS INDEX: 27.59 KG/M2 | DIASTOLIC BLOOD PRESSURE: 90 MMHG | SYSTOLIC BLOOD PRESSURE: 158 MMHG | HEIGHT: 62 IN | WEIGHT: 149.91 LBS

## 2019-06-14 PROCEDURE — 99214 OFFICE O/P EST MOD 30 MIN: CPT

## 2019-06-14 NOTE — HISTORY OF PRESENT ILLNESS
[FreeTextEntry1] : 49 yo female with HTN,  left renal artery aneurysm, breast cancer\par She is on exemestane with maryan\par No new issues. Her father was hospitalized with stents\par BPs have been higher recently. She was only taking 50mg losartan\par Has not seen Dr Florian or IR regarding her renal artery aneurysm  bc she needs to work. No recent CTA\par She is working now as home health aide

## 2019-06-14 NOTE — ASSESSMENT
[FreeTextEntry1] : 47 yo female with breast cancer, HTN, borderline DM, hyperlipidemia with left renal artery aneurysm\par Breast cancer following with Cedric closely\par HTN: BP suboptimal control\par Increase losartan dose 100mg as directed last time and she had not complied. She agrees to increase now\par Need a lower Na diet now. She understands\par Renal artery aneurysm : will need to be evaluated with Albina Lorenzana and IR team in future\par She may need angio with possible intervention

## 2019-06-14 NOTE — REASON FOR VISIT
[Follow-Up] : a follow-up visit [Family Member] : family member [FreeTextEntry1] : hypertension and left renal artery aneurysm

## 2019-06-14 NOTE — PHYSICAL EXAM
[General Appearance - Alert] : alert [General Appearance - In No Acute Distress] : in no acute distress [Sclera] : the sclera and conjunctiva were normal [Neck Appearance] : the appearance of the neck was normal [Auscultation Breath Sounds / Voice Sounds] : lungs were clear to auscultation bilaterally [Heart Rate And Rhythm] : heart rate was normal and rhythm regular [Heart Sounds] : normal S1 and S2 [Heart Sounds Gallop] : no gallops [Murmurs] : no murmurs [Heart Sounds Pericardial Friction Rub] : no pericardial rub [Edema] : there was no peripheral edema [Bowel Sounds] : normal bowel sounds [Abdomen Soft] : soft [No CVA Tenderness] : no ~M costovertebral angle tenderness [Involuntary Movements] : no involuntary movements were seen [] : no rash [No Focal Deficits] : no focal deficits [Oriented To Time, Place, And Person] : oriented to person, place, and time [Affect] : the affect was normal [Mood] : the mood was normal

## 2019-06-18 LAB
25(OH)D3 SERPL-MCNC: 26.2 NG/ML
ESTRADIOL SERPL-MCNC: 6 PG/ML
FSH SERPL-MCNC: 4.9 IU/L

## 2019-06-19 ENCOUNTER — APPOINTMENT (OUTPATIENT)
Dept: SURGICAL ONCOLOGY | Facility: CLINIC | Age: 49
End: 2019-06-19
Payer: MEDICAID

## 2019-06-19 VITALS
HEIGHT: 62 IN | RESPIRATION RATE: 16 BRPM | DIASTOLIC BLOOD PRESSURE: 103 MMHG | WEIGHT: 149 LBS | HEART RATE: 67 BPM | SYSTOLIC BLOOD PRESSURE: 177 MMHG | OXYGEN SATURATION: 100 % | TEMPERATURE: 98.4 F | BODY MASS INDEX: 27.42 KG/M2

## 2019-06-19 VITALS — SYSTOLIC BLOOD PRESSURE: 168 MMHG | DIASTOLIC BLOOD PRESSURE: 95 MMHG

## 2019-06-19 PROCEDURE — 99214 OFFICE O/P EST MOD 30 MIN: CPT

## 2019-06-19 NOTE — ASSESSMENT
[FreeTextEntry1] : IMP:\par YAHAIRA - hx T2N1a invasive ductal carcinoma left breast s/p left mastectomy 9/2017\par S/p chemo; Now On Lupron and exemestane with Dr. Bennett\par BRCA Negative\par Osteopenia\par \par PLAN:\par RTO  4 months; then q 6 months\par Bloodwork per Dr. Bennett\par Right mammogram/sonogram 7/2019 (script in computer)\par Calcium and Vitamin D Supplements for Osteopenia \par

## 2019-06-19 NOTE — PHYSICAL EXAM
[Normal] : supple, no neck mass and thyroid not enlarged [Normal Supraclavicular Lymph Nodes] : normal supraclavicular lymph nodes [Normal Axillary Lymph Nodes] : normal axillary lymph nodes [Normal] : oriented to person, place and time, with appropriate affect [de-identified] : Well healed Left breast SE flap. Right breast with fibrocystic changes only. No masses noted.

## 2019-06-19 NOTE — HISTORY OF PRESENT ILLNESS
[de-identified] : Mlie is a 48 year old female who presents for her 4 month breast cancer follow up.\par \par She is s/p left modified radical mastectomy with SE reconstruction (Dr. Acevedo) on 9/28/17. She returned to the OR on 9/30/17 for an evacuation of a hematoma/seroma. \par \par Final pathology:\par 1.) Left SLN excision: one sentinel lymph node containing a subcapsular metastatic deposit of mammary carcinoma measuring 0.3 cm, 1/1, pN1a. \par 2.) Left mastectomy: Invasive moderately differentiated ductal carcinoma, Ej histologic grade 2 of 3, score 6/9, invasive carcinoma measures 4 cm, pT2. Foci consistent with lymphovascular invasion are identified. DCIS, low to intermediate nuclear grade, cribriform and micropapillary type, a few foci associated with invasive carcinoma. Skin with areola and nipple were negative for malignancy. Negative margins. \par 3.) Left axillary dissection: 0 out of 9 lymph nodes negative for metastatic carcinoma. (0/9)\par 4.) Left internal mammary node. 1 lymph node negative for metastatic carcinoma (0/1)\par \par Completed chemotherapy under the care of Dr. Bennett 1/8/18.  Is currently on Lupron monthly and has recently started Exemestane 25mg without complaints.  BRCA Negative. Osteopenia - on Calcium and Vitamin D Supplements. \par \par Right mammogram/sonogram 7/31/18 - IMPRESSION:  Essentially stable to slightly diminished hypoechoic nodule in the right  breast at 9:00, 5 cm from the nipple, measuring 6 x 6 x 5 mm and  previously measuring 8 x 5 x 6 mm in May 2017. Recommend additional  one-year ultrasound follow-up to confirm 2 years of stability.  RECOMMENDATION:  Mammography and ultrasound in one year  BI-RADS 3-Probably Benign Finding(s)\par \par Denies palpable masses, nipple discharge, skin changes, inversion of Right breast.  Denies any Left chest wall masses.   \par   \par \par Pertinent hx:\par 46 year-old female presents for initial consultation, referred by Dr. Sheba Betancourt. She was recalled from screening breast imaging in May 2017, at which time a diagnostic LEFT-sided mammogram revealed a 2.9 spiculated asymmetry in the left breast corresponding to a hypoechoic nodule on sonogram at 3:00, 8 cmfn. Biopsy of the finding was consistent with invasive ductal carcinoma. ER+/CO+/HER2(-). Oncotype 19.

## 2019-06-22 ENCOUNTER — OUTPATIENT (OUTPATIENT)
Dept: OUTPATIENT SERVICES | Facility: HOSPITAL | Age: 49
LOS: 1 days | Discharge: HOME | End: 2019-06-22

## 2019-06-22 DIAGNOSIS — C50.912 MALIGNANT NEOPLASM OF UNSPECIFIED SITE OF LEFT FEMALE BREAST: ICD-10-CM

## 2019-06-22 DIAGNOSIS — Z90.12 ACQUIRED ABSENCE OF LEFT BREAST AND NIPPLE: Chronic | ICD-10-CM

## 2019-06-27 ENCOUNTER — APPOINTMENT (OUTPATIENT)
Dept: INFUSION THERAPY | Facility: HOSPITAL | Age: 49
End: 2019-06-27

## 2019-07-20 ENCOUNTER — OUTPATIENT (OUTPATIENT)
Dept: OUTPATIENT SERVICES | Facility: HOSPITAL | Age: 49
LOS: 1 days | Discharge: HOME | End: 2019-07-20

## 2019-07-20 DIAGNOSIS — Z90.12 ACQUIRED ABSENCE OF LEFT BREAST AND NIPPLE: Chronic | ICD-10-CM

## 2019-07-20 DIAGNOSIS — C50.912 MALIGNANT NEOPLASM OF UNSPECIFIED SITE OF LEFT FEMALE BREAST: ICD-10-CM

## 2019-07-25 ENCOUNTER — APPOINTMENT (OUTPATIENT)
Dept: INFUSION THERAPY | Facility: HOSPITAL | Age: 49
End: 2019-07-25

## 2019-08-11 NOTE — OB HISTORY
[Normal Amount/Duration] : was of a normal amount and duration [Definite:  ___ (Date)] : the last menstrual period was [unfilled] [Regular Cycle Intervals] : periods have been regular [___] : Living: [unfilled]

## 2019-08-12 ENCOUNTER — APPOINTMENT (OUTPATIENT)
Dept: HEMATOLOGY ONCOLOGY | Facility: CLINIC | Age: 49
End: 2019-08-12
Payer: MEDICAID

## 2019-08-12 ENCOUNTER — RESULT REVIEW (OUTPATIENT)
Age: 49
End: 2019-08-12

## 2019-08-12 ENCOUNTER — APPOINTMENT (OUTPATIENT)
Dept: HEMATOLOGY ONCOLOGY | Facility: CLINIC | Age: 49
End: 2019-08-12

## 2019-08-12 VITALS
OXYGEN SATURATION: 100 % | DIASTOLIC BLOOD PRESSURE: 94 MMHG | BODY MASS INDEX: 27.25 KG/M2 | TEMPERATURE: 98.1 F | SYSTOLIC BLOOD PRESSURE: 176 MMHG | WEIGHT: 148.99 LBS | RESPIRATION RATE: 16 BRPM | HEART RATE: 62 BPM

## 2019-08-12 LAB
BASOPHILS # BLD AUTO: 0.1 K/UL — SIGNIFICANT CHANGE UP (ref 0–0.2)
BASOPHILS NFR BLD AUTO: 0.5 % — SIGNIFICANT CHANGE UP (ref 0–2)
EOSINOPHIL # BLD AUTO: 0.1 K/UL — SIGNIFICANT CHANGE UP (ref 0–0.5)
EOSINOPHIL NFR BLD AUTO: 1.4 % — SIGNIFICANT CHANGE UP (ref 0–6)
HCT VFR BLD CALC: 41.3 % — SIGNIFICANT CHANGE UP (ref 34.5–45)
HGB BLD-MCNC: 14.1 G/DL — SIGNIFICANT CHANGE UP (ref 11.5–15.5)
LYMPHOCYTES # BLD AUTO: 3.2 K/UL — SIGNIFICANT CHANGE UP (ref 1–3.3)
LYMPHOCYTES # BLD AUTO: 33.9 % — SIGNIFICANT CHANGE UP (ref 13–44)
MCHC RBC-ENTMCNC: 29.9 PG — SIGNIFICANT CHANGE UP (ref 27–34)
MCHC RBC-ENTMCNC: 34.2 G/DL — SIGNIFICANT CHANGE UP (ref 32–36)
MCV RBC AUTO: 87.2 FL — SIGNIFICANT CHANGE UP (ref 80–100)
MONOCYTES # BLD AUTO: 0.5 K/UL — SIGNIFICANT CHANGE UP (ref 0–0.9)
MONOCYTES NFR BLD AUTO: 5 % — SIGNIFICANT CHANGE UP (ref 2–14)
NEUTROPHILS # BLD AUTO: 5.5 K/UL — SIGNIFICANT CHANGE UP (ref 1.8–7.4)
NEUTROPHILS NFR BLD AUTO: 59.1 % — SIGNIFICANT CHANGE UP (ref 43–77)
PLATELET # BLD AUTO: 231 K/UL — SIGNIFICANT CHANGE UP (ref 150–400)
RBC # BLD: 4.74 M/UL — SIGNIFICANT CHANGE UP (ref 3.8–5.2)
RBC # FLD: 12.4 % — SIGNIFICANT CHANGE UP (ref 10.3–14.5)
WBC # BLD: 9.4 K/UL — SIGNIFICANT CHANGE UP (ref 3.8–10.5)
WBC # FLD AUTO: 9.4 K/UL — SIGNIFICANT CHANGE UP (ref 3.8–10.5)

## 2019-08-12 PROCEDURE — 99215 OFFICE O/P EST HI 40 MIN: CPT

## 2019-08-12 RX ORDER — GABAPENTIN 300 MG/1
300 CAPSULE ORAL 3 TIMES DAILY
Qty: 90 | Refills: 3 | Status: DISCONTINUED | COMMUNITY
Start: 2017-11-13 | End: 2019-08-12

## 2019-08-12 NOTE — CONSULT LETTER
[Dear  ___] : Dear  [unfilled], [Consult Letter:] : I had the pleasure of evaluating your patient, [unfilled]. [Please see my note below.] : Please see my note below. [Consult Closing:] : Thank you very much for allowing me to participate in the care of this patient.  If you have any questions, please do not hesitate to contact me. [Sincerely,] : Sincerely, [DrTorin  ___] : Dr. HOWARD [FreeTextEntry3] : Mercedes Bennett M.D.\par  of Medicine\par [facility]\par [facility] [place]\par [address] [place] [address]\par [address]\par Tele # [phone]; Fax [phone]

## 2019-08-12 NOTE — ASSESSMENT
[Curative] : Goals of care discussed with patient: Curative [FreeTextEntry1] : This is a 48-year-old very pleasant premenopausal lady, who is diagnosed with stage IIB (T2, N1, M0) ER/MI positive and HER-2/shaila negative left breast well to moderately differentiated invasive ductal carcinoma. She is status post left mastectomy and axillary lymph node dissection on 10/20/17. Intermediate oncotype. Completed TC x 4. BRCA NEGATIVE. Consented for PALLAS and on arm B C18\par \par - Breast ca: Clinically, no evidence of disease. She is tolerating exemestane very well without significant side effects.  Reports good compliance. Plan to continue AI for total of 5-10 years. Continue lupron monthly. she will report vag bleeding spotting. Estradiol suppressed and FSH is low 5/2019.  She is up to date with breast imaging. Follows with Dr Lees\par - Aromatase inhibitor induced arthralgia: Mild and tolerable. Rec stretching exercises, physical therapy and ortho f/u\par - Hot flashes: 2/2 exemestane: Discussed to start effexor. S/e reviewed. She hasn’t been using gabapentin as she feels drowsy with it. \par - Hair thinning- Likely 2/2 AI. Stable with Biotin\par - Osteopenia: concern for worsening bone density due to exemestane. Rec to  continue calcium and vit D. DEXA 1-2 yrs.\par - High cholesterol: concern for worsening cholesterol due to AI. Pt is on zocor. Lipid profile annually.\par - Low Vitamin D: concern for worsening bone loss due to exemestane and low vit D. Discussed to increase Vit D intake. Check level today\par - Post mastectomy pain- resolved\par 	\par RTC 3m per protocol

## 2019-08-12 NOTE — HISTORY OF PRESENT ILLNESS
[Date: ____________] : Patient's last distress assessment performed on [unfilled]. [6 - Distress Level] : Distress Level: 6 [Patient given social work contact information and resource sheet] : Patient was given social work contact information and resource sheet [IIB] : IIB [de-identified] : This is a very pleasant 48- year-old premenopausal lady who was diagnosed with breast cancer and is here for a consultation today. Her oncologic history is follows:\par \par She underwent routine breast imaging on 5/4/17 which showed a spiculated lesion measuring 2.2 x 2.9 cm in the upper outer left breast. She underwent left breast 2:00 lesion core biopsy on 6/9/17 which showed invasive well-differentiated ductal carcinoma, Ej score 5/9, measuring 1.5 cm, ER 95%, KY 10% HER-2/shaila negative. She underwent a breast MRI on 7/15/17 which showed additional concerning areas for which biopsy was recommended. She underwent left breast 3:00 MRI biopsy on 7/18/17 which showed invasive well-differentiated ductal carcinoma, measuring 0.6 cm, Ej score 5/9 ER more than 90%, KY more than 90% HER-2/shaila negative. \par \par She underwent left mastectomy and left sentinel axillary lymph node dissection on 9/28/17 which showed invasive moderately differentiated ductal carcinoma, Dickens grade 2/3, measuring 4 cm, 0.3 cm from the margin. Lymphovascular invasion was noted. Low/intermediate grade DCIS was noted. One sentinel lymph node was positive for metastasis measuring 0.3 cm. 10 additional lymph nodes were negative.\par \par s/p TC x4 completed in January.2018\par superficial phlebitis in the right arm after cycle 4 chemo. No s/f thrombus felt but noted to have focal superficial thrombosis on sono.therefore started on OS+AI instead of tamoxifen [de-identified] : \par GENETICS: CANCERNEXT AMBRY PANEL NEGATIVE \par  [FreeTextEntry1] : lupron started 2/2018\par exemestane 3/2018 [de-identified] : Ms. OSKAR SHARMA  is here for a follow up for left breast cancer on Lupron since 2/2018, and Exemestane since 3/2018.  \par Takes exemestane daily, good compliance. She reports stiffness in hip and knees, doesn't need pain meds- same as last visit. No new aches/pain, no vag dryness, no excessive fatigue. Hair loss stable with biotin. She reports hot flashes are worse, wakes her from sleep. She is active, no change in energy, wt or appetite. Working full time and exercising daily. LMP 11/15/17, not sexually active. Tolerating Lupron monthly. no vag spotting. Post mastectomy pain resolved and she isnt taking gabapen.  is resolved. \par mammogram with Dr Lees July 2018, BIRADS 3, f/u in 1 yr OVERDUE. Scheduled for next week\par Dexa 6/2018 -1.2 Osteopenia. Takes calcium and vit D\par

## 2019-08-12 NOTE — PHYSICAL EXAM
[Fully active, able to carry on all pre-disease performance without restriction] : Status 0 - Fully active, able to carry on all pre-disease performance without restriction [Normal] : PERRL, EOMI, no conjunctival infection, anicteric [de-identified] : s/p left mastectomy with reconstruction. no CW or axillary nodule. No abnormalities in the right breast

## 2019-08-20 ENCOUNTER — OUTPATIENT (OUTPATIENT)
Dept: OUTPATIENT SERVICES | Facility: HOSPITAL | Age: 49
LOS: 1 days | Discharge: HOME | End: 2019-08-20

## 2019-08-20 DIAGNOSIS — Z90.12 ACQUIRED ABSENCE OF LEFT BREAST AND NIPPLE: Chronic | ICD-10-CM

## 2019-08-20 DIAGNOSIS — C50.912 MALIGNANT NEOPLASM OF UNSPECIFIED SITE OF LEFT FEMALE BREAST: ICD-10-CM

## 2019-08-20 LAB
25(OH)D3 SERPL-MCNC: 30.7 NG/ML
ALBUMIN SERPL ELPH-MCNC: 4.5 G/DL
ALP BLD-CCNC: 98 U/L
ALT SERPL-CCNC: 22 U/L
ANION GAP SERPL CALC-SCNC: 14 MMOL/L
AST SERPL-CCNC: 17 U/L
BILIRUB SERPL-MCNC: 0.4 MG/DL
BUN SERPL-MCNC: 11 MG/DL
CALCIUM SERPL-MCNC: 9.7 MG/DL
CHLORIDE SERPL-SCNC: 106 MMOL/L
CO2 SERPL-SCNC: 26 MMOL/L
CREAT SERPL-MCNC: 0.68 MG/DL
GLUCOSE SERPL-MCNC: 105 MG/DL
POTASSIUM SERPL-SCNC: 4.2 MMOL/L
PROT SERPL-MCNC: 7.7 G/DL
SODIUM SERPL-SCNC: 146 MMOL/L

## 2019-08-22 ENCOUNTER — APPOINTMENT (OUTPATIENT)
Dept: INFUSION THERAPY | Facility: HOSPITAL | Age: 49
End: 2019-08-22

## 2019-08-25 ENCOUNTER — FORM ENCOUNTER (OUTPATIENT)
Age: 49
End: 2019-08-25

## 2019-08-26 ENCOUNTER — APPOINTMENT (OUTPATIENT)
Dept: MAMMOGRAPHY | Facility: IMAGING CENTER | Age: 49
End: 2019-08-26
Payer: MEDICAID

## 2019-08-26 ENCOUNTER — APPOINTMENT (OUTPATIENT)
Dept: ULTRASOUND IMAGING | Facility: IMAGING CENTER | Age: 49
End: 2019-08-26
Payer: MEDICAID

## 2019-08-26 ENCOUNTER — OUTPATIENT (OUTPATIENT)
Dept: OUTPATIENT SERVICES | Facility: HOSPITAL | Age: 49
LOS: 1 days | End: 2019-08-26
Payer: MEDICAID

## 2019-08-26 DIAGNOSIS — Z90.12 ACQUIRED ABSENCE OF LEFT BREAST AND NIPPLE: Chronic | ICD-10-CM

## 2019-08-26 DIAGNOSIS — C50.912 MALIGNANT NEOPLASM OF UNSPECIFIED SITE OF LEFT FEMALE BREAST: ICD-10-CM

## 2019-08-26 PROCEDURE — G0279: CPT

## 2019-08-26 PROCEDURE — 76641 ULTRASOUND BREAST COMPLETE: CPT

## 2019-08-26 PROCEDURE — G0279: CPT | Mod: 26

## 2019-08-26 PROCEDURE — 77065 DX MAMMO INCL CAD UNI: CPT

## 2019-08-26 PROCEDURE — 76641 ULTRASOUND BREAST COMPLETE: CPT | Mod: 26,RT

## 2019-08-26 PROCEDURE — 77065 DX MAMMO INCL CAD UNI: CPT | Mod: 26,RT

## 2019-09-19 ENCOUNTER — APPOINTMENT (OUTPATIENT)
Dept: INFUSION THERAPY | Facility: HOSPITAL | Age: 49
End: 2019-09-19

## 2019-09-27 ENCOUNTER — APPOINTMENT (OUTPATIENT)
Dept: NEPHROLOGY | Facility: CLINIC | Age: 49
End: 2019-09-27
Payer: MEDICAID

## 2019-09-27 VITALS — HEART RATE: 64 BPM | SYSTOLIC BLOOD PRESSURE: 168 MMHG | DIASTOLIC BLOOD PRESSURE: 88 MMHG

## 2019-09-27 PROCEDURE — 99214 OFFICE O/P EST MOD 30 MIN: CPT | Mod: 25

## 2019-09-27 PROCEDURE — 36415 COLL VENOUS BLD VENIPUNCTURE: CPT

## 2019-09-27 NOTE — REASON FOR VISIT
[Follow-Up] : a follow-up visit [FreeTextEntry1] : hypertension and left renal artery aneurysm [Family Member] : family member

## 2019-09-27 NOTE — HISTORY OF PRESENT ILLNESS
[FreeTextEntry1] : 48 yo female with HTN,  left renal artery aneurysm, breast cancer\par She is on exemestane with maryan\par No new issues.  She was taking her blood pressures at home ranges been in the 130s to 140s over 80s.  She brought her blood pressure cuff in today to match\par Has not seen Dr Florian or IR regarding her renal artery aneurysm  bc she needs to work. No recent CTA\par She is working now as home health aide\par She does have muscle cramps in legs almost every night

## 2019-09-27 NOTE — ASSESSMENT
[FreeTextEntry1] : 50 yo female with breast cancer, HTN, borderline DM, hyperlipidemia with left renal artery aneurysm\par Breast cancer following with Cedric closely\par HTN: Has reactive component.  Blood pressure at home is more controlled however does still need better control\par Continue losartan dose 100mg.  Add amlodipine 2.5 mg daily at bedtime\par Need a lower Na diet now. \par Renal artery aneurysm : will need to be evaluated with Albina Lorenzana and IR team in future\par Check labs today including magnesium for leg cramps.  Increase her hydration.  Recent labs in the past show hypernatremia consistent with free water deficit.\par She may need angio with possible intervention

## 2019-09-27 NOTE — PHYSICAL EXAM
[General Appearance - In No Acute Distress] : in no acute distress [General Appearance - Alert] : alert [Sclera] : the sclera and conjunctiva were normal [Neck Appearance] : the appearance of the neck was normal [Heart Rate And Rhythm] : heart rate was normal and rhythm regular [Auscultation Breath Sounds / Voice Sounds] : lungs were clear to auscultation bilaterally [Heart Sounds] : normal S1 and S2 [Heart Sounds Pericardial Friction Rub] : no pericardial rub [Murmurs] : no murmurs [Edema] : there was no peripheral edema [Bowel Sounds] : normal bowel sounds [Abdomen Soft] : soft [No CVA Tenderness] : no ~M costovertebral angle tenderness [Involuntary Movements] : no involuntary movements were seen [] : no rash [Oriented To Time, Place, And Person] : oriented to person, place, and time [No Focal Deficits] : no focal deficits [Mood] : the mood was normal [Affect] : the affect was normal

## 2019-09-30 LAB
ALBUMIN SERPL ELPH-MCNC: 4.8 G/DL
ANION GAP SERPL CALC-SCNC: 15 MMOL/L
APPEARANCE: ABNORMAL
BACTERIA: NEGATIVE
BILIRUBIN URINE: NEGATIVE
BLOOD URINE: NORMAL
BUN SERPL-MCNC: 10 MG/DL
CALCIUM SERPL-MCNC: 10.4 MG/DL
CHLORIDE SERPL-SCNC: 103 MMOL/L
CO2 SERPL-SCNC: 26 MMOL/L
COLOR: NORMAL
CREAT SERPL-MCNC: 0.65 MG/DL
CREAT SPEC-SCNC: 60 MG/DL
GLUCOSE QUALITATIVE U: NEGATIVE
GLUCOSE SERPL-MCNC: 93 MG/DL
HYALINE CASTS: 0 /LPF
KETONES URINE: NEGATIVE
LEUKOCYTE ESTERASE URINE: NEGATIVE
MAGNESIUM SERPL-MCNC: 2.4 MG/DL
MICROALBUMIN 24H UR DL<=1MG/L-MCNC: 7.4 MG/DL
MICROALBUMIN/CREAT 24H UR-RTO: 124 MG/G
MICROSCOPIC-UA: NORMAL
NITRITE URINE: NEGATIVE
PH URINE: 7.5
PHOSPHATE SERPL-MCNC: 3.8 MG/DL
POTASSIUM SERPL-SCNC: 4.2 MMOL/L
PROTEIN URINE: NORMAL
RED BLOOD CELLS URINE: 8 /HPF
SODIUM SERPL-SCNC: 144 MMOL/L
SPECIFIC GRAVITY URINE: 1.01
SQUAMOUS EPITHELIAL CELLS: 1 /HPF
UROBILINOGEN URINE: NORMAL
WHITE BLOOD CELLS URINE: 0 /HPF

## 2019-10-09 ENCOUNTER — APPOINTMENT (OUTPATIENT)
Dept: SURGICAL ONCOLOGY | Facility: CLINIC | Age: 49
End: 2019-10-09
Payer: MEDICAID

## 2019-10-09 VITALS
OXYGEN SATURATION: 94 % | RESPIRATION RATE: 16 BRPM | HEIGHT: 62 IN | SYSTOLIC BLOOD PRESSURE: 162 MMHG | WEIGHT: 148 LBS | DIASTOLIC BLOOD PRESSURE: 85 MMHG | BODY MASS INDEX: 27.23 KG/M2 | HEART RATE: 61 BPM | TEMPERATURE: 98.3 F

## 2019-10-09 DIAGNOSIS — Z08 ENCOUNTER FOR FOLLOW-UP EXAMINATION AFTER COMPLETED TREATMENT FOR MALIGNANT NEOPLASM: ICD-10-CM

## 2019-10-09 DIAGNOSIS — Z85.3 ENCOUNTER FOR FOLLOW-UP EXAMINATION AFTER COMPLETED TREATMENT FOR MALIGNANT NEOPLASM: ICD-10-CM

## 2019-10-09 PROCEDURE — 99214 OFFICE O/P EST MOD 30 MIN: CPT

## 2019-10-09 NOTE — HISTORY OF PRESENT ILLNESS
[de-identified] : Mile is a 49 year old female who presents for her 4 month breast cancer follow up.\par \par She is s/p left modified radical mastectomy with SE reconstruction (Dr. Acevedo) on 9/28/17. She returned to the OR on 9/30/17 for an evacuation of a hematoma/seroma. \par \par Final pathology:\par 1.) Left SLN excision: one sentinel lymph node containing a subcapsular metastatic deposit of mammary carcinoma measuring 0.3 cm, 1/1, pN1a. \par 2.) Left mastectomy: Invasive moderately differentiated ductal carcinoma, Ej histologic grade 2 of 3, score 6/9, invasive carcinoma measures 4 cm, pT2. Foci consistent with lymphovascular invasion are identified. DCIS, low to intermediate nuclear grade, cribriform and micropapillary type, a few foci associated with invasive carcinoma. Skin with areola and nipple were negative for malignancy. Negative margins. \par 3.) Left axillary dissection: 0 out of 9 lymph nodes negative for metastatic carcinoma. (0/9)\par 4.) Left internal mammary node. 1 lymph node negative for metastatic carcinoma (0/1)\par \par Completed chemotherapy under the care of Dr. Bennett 1/8/18.  Is currently on Lupron monthly and has recently started Exemestane 25mg without complaints.  BRCA Negative. Osteopenia - on Calcium and Vitamin D Supplements. \par \par Right mammo/sono Aug 2019 notable for a 6mm hypoechoic mass at 9:00 (Birads 3).  She denies palpable masses, nipple discharge, skin changes, inversion of Right breast.  Denies any Left chest wall masses.    \par \par Pertinent hx:\par 46 year-old female presents for initial consultation, referred by Dr. Sheba Betancourt. She was recalled from screening breast imaging in May 2017, at which time a diagnostic LEFT-sided mammogram revealed a 2.9 spiculated asymmetry in the left breast corresponding to a hypoechoic nodule on sonogram at 3:00, 8 cmfn. Biopsy of the finding was consistent with invasive ductal carcinoma. ER+/KS+/HER2(-). Oncotype 19.

## 2019-10-09 NOTE — ASSESSMENT
[FreeTextEntry1] : IMP:\par YAHAIRA - hx T2N1a invasive ductal carcinoma left breast s/p left mastectomy 9/2017\par S/p chemo; Now On Lupron and exemestane with Dr. Bennett\par BRCA Negative\par Right breast hypochoic mass 9:00 stable (Birads 3)\par \par PLAN:\par RTO  q 6 months\par Right breast sono Feb 2020\par Bloodwork per Dr. Bennett\par \par

## 2019-10-09 NOTE — REASON FOR VISIT
[Breast Cancer] : breast cancer [Follow-Up Visit] : a follow-up visit for [Family Member] : family member

## 2019-10-09 NOTE — PHYSICAL EXAM
[Normal] : supple, no neck mass and thyroid not enlarged [Normal Supraclavicular Lymph Nodes] : normal supraclavicular lymph nodes [Normal Axillary Lymph Nodes] : normal axillary lymph nodes [Normal] : oriented to person, place and time, with appropriate affect [de-identified] : Well healed Left breast SE flap. Right breast with fibrocystic changes only and no masses.  [FreeTextEntry1] : Deferred

## 2019-10-14 ENCOUNTER — OUTPATIENT (OUTPATIENT)
Dept: OUTPATIENT SERVICES | Facility: HOSPITAL | Age: 49
LOS: 1 days | Discharge: HOME | End: 2019-10-14

## 2019-10-14 DIAGNOSIS — C50.912 MALIGNANT NEOPLASM OF UNSPECIFIED SITE OF LEFT FEMALE BREAST: ICD-10-CM

## 2019-10-14 DIAGNOSIS — Z90.12 ACQUIRED ABSENCE OF LEFT BREAST AND NIPPLE: Chronic | ICD-10-CM

## 2019-10-17 ENCOUNTER — APPOINTMENT (OUTPATIENT)
Dept: INFUSION THERAPY | Facility: HOSPITAL | Age: 49
End: 2019-10-17

## 2019-10-25 NOTE — H&P PST ADULT - PRO ANTICIPATED DISCH DISP
Patient's partner tested positive for Gonorrhea. He would like a full STI screening and [facility] all at once instead of coming twice. He works close by Wed-Fri.    home

## 2019-11-04 ENCOUNTER — RESULT REVIEW (OUTPATIENT)
Age: 49
End: 2019-11-04

## 2019-11-04 ENCOUNTER — APPOINTMENT (OUTPATIENT)
Dept: HEMATOLOGY ONCOLOGY | Facility: CLINIC | Age: 49
End: 2019-11-04
Payer: MEDICAID

## 2019-11-04 ENCOUNTER — APPOINTMENT (OUTPATIENT)
Dept: HEMATOLOGY ONCOLOGY | Facility: CLINIC | Age: 49
End: 2019-11-04

## 2019-11-04 VITALS
DIASTOLIC BLOOD PRESSURE: 91 MMHG | TEMPERATURE: 98.1 F | SYSTOLIC BLOOD PRESSURE: 152 MMHG | RESPIRATION RATE: 18 BRPM | BODY MASS INDEX: 27.3 KG/M2 | WEIGHT: 149.25 LBS | OXYGEN SATURATION: 100 % | HEART RATE: 65 BPM

## 2019-11-04 LAB
BASOPHILS # BLD AUTO: 0.1 K/UL — SIGNIFICANT CHANGE UP (ref 0–0.2)
BASOPHILS NFR BLD AUTO: 0.7 % — SIGNIFICANT CHANGE UP (ref 0–2)
EOSINOPHIL # BLD AUTO: 0.2 K/UL — SIGNIFICANT CHANGE UP (ref 0–0.5)
EOSINOPHIL NFR BLD AUTO: 1.7 % — SIGNIFICANT CHANGE UP (ref 0–6)
HCT VFR BLD CALC: 43.2 % — SIGNIFICANT CHANGE UP (ref 34.5–45)
HGB BLD-MCNC: 14.5 G/DL — SIGNIFICANT CHANGE UP (ref 11.5–15.5)
LYMPHOCYTES # BLD AUTO: 29.8 % — SIGNIFICANT CHANGE UP (ref 13–44)
LYMPHOCYTES # BLD AUTO: 3 K/UL — SIGNIFICANT CHANGE UP (ref 1–3.3)
MCHC RBC-ENTMCNC: 29.6 PG — SIGNIFICANT CHANGE UP (ref 27–34)
MCHC RBC-ENTMCNC: 33.5 G/DL — SIGNIFICANT CHANGE UP (ref 32–36)
MCV RBC AUTO: 88.4 FL — SIGNIFICANT CHANGE UP (ref 80–100)
MONOCYTES # BLD AUTO: 0.5 K/UL — SIGNIFICANT CHANGE UP (ref 0–0.9)
MONOCYTES NFR BLD AUTO: 4.9 % — SIGNIFICANT CHANGE UP (ref 2–14)
NEUTROPHILS # BLD AUTO: 6.3 K/UL — SIGNIFICANT CHANGE UP (ref 1.8–7.4)
NEUTROPHILS NFR BLD AUTO: 62.8 % — SIGNIFICANT CHANGE UP (ref 43–77)
PLATELET # BLD AUTO: 254 K/UL — SIGNIFICANT CHANGE UP (ref 150–400)
RBC # BLD: 4.89 M/UL — SIGNIFICANT CHANGE UP (ref 3.8–5.2)
RBC # FLD: 13.5 % — SIGNIFICANT CHANGE UP (ref 10.3–14.5)
WBC # BLD: 10 K/UL — SIGNIFICANT CHANGE UP (ref 3.8–10.5)
WBC # FLD AUTO: 10 K/UL — SIGNIFICANT CHANGE UP (ref 3.8–10.5)

## 2019-11-04 PROCEDURE — 99214 OFFICE O/P EST MOD 30 MIN: CPT

## 2019-11-04 RX ORDER — VENLAFAXINE HYDROCHLORIDE 37.5 MG/1
37.5 CAPSULE, EXTENDED RELEASE ORAL DAILY
Qty: 90 | Refills: 1 | Status: DISCONTINUED | COMMUNITY
Start: 2019-08-12 | End: 2019-11-04

## 2019-11-04 NOTE — PHYSICAL EXAM
[de-identified] : s/p left mastectomy with reconstruction. no CW or axillary nodule. No abnormalities in the right breast

## 2019-11-04 NOTE — CONSULT LETTER
[FreeTextEntry3] : Mercedes Bennett M.D.\par  of Medicine\par [facility]\par [facility] [place]\par [address] [place] [address]\par [address]\par Tele # [phone]; Fax [phone]

## 2019-11-04 NOTE — ASSESSMENT
[FreeTextEntry1] : This is a 48-year-old very pleasant premenopausal lady, who is diagnosed with stage IIB (T2, N1, M0) ER/NE positive and HER-2/shaila negative left breast well to moderately differentiated invasive ductal carcinoma. She is status post left mastectomy and axillary lymph node dissection on 10/20/17. Intermediate oncotype. Completed TC x 4. BRCA NEGATIVE. Consented for PALLAS and on arm B C21\par \par - Breast ca: Clinically, no evidence of disease. She is tolerating exemestane very well without significant side effects.  Reports good compliance. Plan to continue AI for total of 5-10 years. Continue lupron monthly. she will report vag bleeding spotting. Estradiol suppressed and FSH is low 5/2019. Doesnt want BSO. She is up to date with breast imaging. Follows with Dr Lees.\par - Aromatase inhibitor induced arthralgia: Mild and tolerable. Rec stretching exercises, physical therapy and ortho f/u\par - Hot flashes: 2/2 exemestane: Advised to wear layers and use fan prn. She could not tolerate effexor or gabpaneitn\par - Hair thinning- Likely 2/2 AI. Stable with Biotin\par - Osteopenia: concern for worsening bone density due to exemestane. Rec to  continue calcium and vit D. DEXA 1-2 yrs.\par - High cholesterol: concern for worsening cholesterol due to AI. Pt is on zocor. Lipid profile annually.\par - Low Vitamin D: concern for worsening bone loss due to exemestane and low vit D. Discussed to increase Vit D intake. Check level today\par - Post mastectomy pain- resolved\par 	\par RTC 3m per protocol

## 2019-11-04 NOTE — HISTORY OF PRESENT ILLNESS
[de-identified] : This is a very pleasant 48- year-old premenopausal lady who was diagnosed with breast cancer and is here for a consultation today. Her oncologic history is follows:\par \par She underwent routine breast imaging on 5/4/17 which showed a spiculated lesion measuring 2.2 x 2.9 cm in the upper outer left breast. She underwent left breast 2:00 lesion core biopsy on 6/9/17 which showed invasive well-differentiated ductal carcinoma, Ej score 5/9, measuring 1.5 cm, ER 95%, [place] 10% HER-2/shaila negative. She underwent a breast MRI on 7/15/17 which showed additional concerning areas for which biopsy was recommended. She underwent left breast 3:00 MRI biopsy on 7/18/17 which showed invasive well-differentiated ductal carcinoma, measuring 0.6 cm, Ej score 5/9 ER more than 90%, [place] more than 90% HER-2/shaila negative. \par \par She underwent left mastectomy and left sentinel axillary lymph node dissection on 9/28/17 which showed invasive moderately differentiated ductal carcinoma, [place] grade 2/3, measuring 4 cm, 0.3 cm from the margin. Lymphovascular invasion was noted. Low/intermediate grade DCIS was noted. One sentinel lymph node was positive for metastasis measuring 0.3 cm. 10 additional lymph nodes were negative.\par \par s/p TC x4 completed in January.2018\par superficial phlebitis in the right arm after cycle 4 chemo. No s/f thrombus felt but noted to have focal superficial thrombosis on sono.therefore started on OS+AI instead of tamoxifen [de-identified] : \par GENETICS: CANCERNEXT AMBRY PANEL NEGATIVE \par  [FreeTextEntry1] : lupron started 2/2018\par exemestane 3/2018 [de-identified] : Ms. OSKAR SHARMA  is here for a follow up for left breast cancer on Lupron since 2/2018, and Exemestane since 3/2018.  \par Takes exemestane daily, good compliance. She reports stiffness in hip and knees, doesn't need pain meds- same as last visit. No new aches/pain, no vag dryness, no excessive fatigue. Hair loss stable with biotin. She was prescribed effexor for hot flashes but felt very tired and sleepy therefore arent using it. Hot flashes are still present but tolerable. Cold weather is helping her. She is active, no change in energy, wt or appetite. Working full time and exercising daily. LMP 11/15/17. not sexually active. Tolerating Lupron monthly. no vag spotting. Post mastectomy pain resolved and she isnt taking gabapen.  is resolved. FSh 4-5 range. Doesnt want BSO. \par mammogram with Dr Lees 8/2019\par Dexa 6/2018 -1.2 Osteopenia. Takes calcium and vit D\par Dx with mild proteinuria and glaucoma, f/b PMD and opthal

## 2019-11-05 ENCOUNTER — OUTPATIENT (OUTPATIENT)
Dept: OUTPATIENT SERVICES | Facility: HOSPITAL | Age: 49
LOS: 1 days | Discharge: HOME | End: 2019-11-05

## 2019-11-05 DIAGNOSIS — C50.912 MALIGNANT NEOPLASM OF UNSPECIFIED SITE OF LEFT FEMALE BREAST: ICD-10-CM

## 2019-11-05 DIAGNOSIS — Z90.12 ACQUIRED ABSENCE OF LEFT BREAST AND NIPPLE: Chronic | ICD-10-CM

## 2019-11-14 ENCOUNTER — APPOINTMENT (OUTPATIENT)
Dept: INFUSION THERAPY | Facility: HOSPITAL | Age: 49
End: 2019-11-14

## 2019-11-27 ENCOUNTER — APPOINTMENT (OUTPATIENT)
Dept: PLASTIC SURGERY | Facility: CLINIC | Age: 49
End: 2019-11-27
Payer: MEDICAID

## 2019-11-27 PROCEDURE — 99213 OFFICE O/P EST LOW 20 MIN: CPT

## 2019-12-05 ENCOUNTER — OUTPATIENT (OUTPATIENT)
Dept: OUTPATIENT SERVICES | Facility: HOSPITAL | Age: 49
LOS: 1 days | Discharge: HOME | End: 2019-12-05

## 2019-12-05 DIAGNOSIS — Z90.12 ACQUIRED ABSENCE OF LEFT BREAST AND NIPPLE: Chronic | ICD-10-CM

## 2019-12-05 DIAGNOSIS — C50.912 MALIGNANT NEOPLASM OF UNSPECIFIED SITE OF LEFT FEMALE BREAST: ICD-10-CM

## 2019-12-12 ENCOUNTER — APPOINTMENT (OUTPATIENT)
Dept: INFUSION THERAPY | Facility: HOSPITAL | Age: 49
End: 2019-12-12

## 2019-12-20 ENCOUNTER — APPOINTMENT (OUTPATIENT)
Dept: NEPHROLOGY | Facility: CLINIC | Age: 49
End: 2019-12-20
Payer: MEDICAID

## 2019-12-20 VITALS
BODY MASS INDEX: 22.62 KG/M2 | HEIGHT: 68 IN | HEART RATE: 72 BPM | OXYGEN SATURATION: 98 % | SYSTOLIC BLOOD PRESSURE: 146 MMHG | DIASTOLIC BLOOD PRESSURE: 97 MMHG | WEIGHT: 149.25 LBS

## 2019-12-20 VITALS — DIASTOLIC BLOOD PRESSURE: 80 MMHG | HEART RATE: 70 BPM | SYSTOLIC BLOOD PRESSURE: 130 MMHG

## 2019-12-20 PROCEDURE — 99213 OFFICE O/P EST LOW 20 MIN: CPT

## 2019-12-20 NOTE — ASSESSMENT
[FreeTextEntry1] : 48 yo female with breast cancer, HTN, borderline DM, hyperlipidemia with left renal artery aneurysm\par Breast cancer following with Cedric closely\par HTN: Has reactive component.  Blood pressure at home is very well controlled\par Continue losartan dose 100mg and amlodipine 2.5 mg daily at bedtime\par Need a lower Na diet now. \par Renal artery aneurysm : will need to be evaluated with Albina Lorenzana and IR team in future\par We will repeat microalbumin to creatinine ratio.

## 2019-12-20 NOTE — HISTORY OF PRESENT ILLNESS
[FreeTextEntry1] : 50 yo female with HTN,  left renal artery aneurysm, breast cancer\par She is on exemestane with maryan\par Has not seen Dr Florian or IR regarding her renal artery aneurysm. No recent CTA\par She is working now as home health aide\par She is seeing plastics for possible reconstruction repair\par Her blood pressures at home have been well controlled.  She brought a recording of elevated blood pressures been in the 100s to 120s systolic

## 2019-12-20 NOTE — PHYSICAL EXAM
[General Appearance - In No Acute Distress] : in no acute distress [General Appearance - Alert] : alert [Sclera] : the sclera and conjunctiva were normal [Neck Appearance] : the appearance of the neck was normal [Auscultation Breath Sounds / Voice Sounds] : lungs were clear to auscultation bilaterally [Heart Rate And Rhythm] : heart rate was normal and rhythm regular [Murmurs] : no murmurs [Heart Sounds] : normal S1 and S2 [Heart Sounds Pericardial Friction Rub] : no pericardial rub [Edema] : there was no peripheral edema [Bowel Sounds] : normal bowel sounds [No CVA Tenderness] : no ~M costovertebral angle tenderness [Abdomen Soft] : soft [Involuntary Movements] : no involuntary movements were seen [] : no rash [No Focal Deficits] : no focal deficits [Oriented To Time, Place, And Person] : oriented to person, place, and time [Affect] : the affect was normal [Mood] : the mood was normal

## 2020-01-07 ENCOUNTER — OUTPATIENT (OUTPATIENT)
Dept: OUTPATIENT SERVICES | Facility: HOSPITAL | Age: 50
LOS: 1 days | Discharge: HOME | End: 2020-01-07

## 2020-01-07 DIAGNOSIS — C50.912 MALIGNANT NEOPLASM OF UNSPECIFIED SITE OF LEFT FEMALE BREAST: ICD-10-CM

## 2020-01-07 DIAGNOSIS — Z90.12 ACQUIRED ABSENCE OF LEFT BREAST AND NIPPLE: Chronic | ICD-10-CM

## 2020-01-09 ENCOUNTER — APPOINTMENT (OUTPATIENT)
Dept: INFUSION THERAPY | Facility: HOSPITAL | Age: 50
End: 2020-01-09

## 2020-01-25 NOTE — OB HISTORY
[Regular Cycle Intervals] : periods have been regular [Definite:  ___ (Date)] : the last menstrual period was [unfilled] [Normal Amount/Duration] : was of a normal amount and duration [___] : Living: [unfilled]

## 2020-01-27 ENCOUNTER — APPOINTMENT (OUTPATIENT)
Dept: HEMATOLOGY ONCOLOGY | Facility: CLINIC | Age: 50
End: 2020-01-27
Payer: MEDICAID

## 2020-01-27 ENCOUNTER — RESULT REVIEW (OUTPATIENT)
Age: 50
End: 2020-01-27

## 2020-01-27 VITALS
BODY MASS INDEX: 22.36 KG/M2 | WEIGHT: 147.05 LBS | SYSTOLIC BLOOD PRESSURE: 155 MMHG | TEMPERATURE: 98.3 F | OXYGEN SATURATION: 100 % | HEART RATE: 71 BPM | RESPIRATION RATE: 16 BRPM | DIASTOLIC BLOOD PRESSURE: 90 MMHG

## 2020-01-27 DIAGNOSIS — G89.18 OTHER ACUTE POSTPROCEDURAL PAIN: ICD-10-CM

## 2020-01-27 LAB
BASOPHILS # BLD AUTO: 0 K/UL — SIGNIFICANT CHANGE UP (ref 0–0.2)
BASOPHILS NFR BLD AUTO: 0.5 % — SIGNIFICANT CHANGE UP (ref 0–2)
EOSINOPHIL # BLD AUTO: 0.1 K/UL — SIGNIFICANT CHANGE UP (ref 0–0.5)
EOSINOPHIL NFR BLD AUTO: 1.4 % — SIGNIFICANT CHANGE UP (ref 0–6)
HCT VFR BLD CALC: 41.4 % — SIGNIFICANT CHANGE UP (ref 34.5–45)
HGB BLD-MCNC: 13.9 G/DL — SIGNIFICANT CHANGE UP (ref 11.5–15.5)
LYMPHOCYTES # BLD AUTO: 2.7 K/UL — SIGNIFICANT CHANGE UP (ref 1–3.3)
LYMPHOCYTES # BLD AUTO: 26.8 % — SIGNIFICANT CHANGE UP (ref 13–44)
MCHC RBC-ENTMCNC: 29.4 PG — SIGNIFICANT CHANGE UP (ref 27–34)
MCHC RBC-ENTMCNC: 33.6 G/DL — SIGNIFICANT CHANGE UP (ref 32–36)
MCV RBC AUTO: 87.4 FL — SIGNIFICANT CHANGE UP (ref 80–100)
MONOCYTES # BLD AUTO: 0.6 K/UL — SIGNIFICANT CHANGE UP (ref 0–0.9)
MONOCYTES NFR BLD AUTO: 5.4 % — SIGNIFICANT CHANGE UP (ref 2–14)
NEUTROPHILS # BLD AUTO: 6.7 K/UL — SIGNIFICANT CHANGE UP (ref 1.8–7.4)
NEUTROPHILS NFR BLD AUTO: 65.8 % — SIGNIFICANT CHANGE UP (ref 43–77)
PLATELET # BLD AUTO: 232 K/UL — SIGNIFICANT CHANGE UP (ref 150–400)
RBC # BLD: 4.73 M/UL — SIGNIFICANT CHANGE UP (ref 3.8–5.2)
RBC # FLD: 12.4 % — SIGNIFICANT CHANGE UP (ref 10.3–14.5)
WBC # BLD: 10.2 K/UL — SIGNIFICANT CHANGE UP (ref 3.8–10.5)
WBC # FLD AUTO: 10.2 K/UL — SIGNIFICANT CHANGE UP (ref 3.8–10.5)

## 2020-01-27 PROCEDURE — 99214 OFFICE O/P EST MOD 30 MIN: CPT

## 2020-01-27 NOTE — HISTORY OF PRESENT ILLNESS
[6 - Distress Level] : Distress Level: 6 [Date: ____________] : Patient's last distress assessment performed on [unfilled]. [Patient given social work contact information and resource sheet] : Patient was given social work contact information and resource sheet [IIB] : IIB [de-identified] : This is a very pleasant 48- year-old premenopausal lady who was diagnosed with breast cancer and is here for a consultation today. Her oncologic history is follows:\par \par She underwent routine breast imaging on 5/4/17 which showed a spiculated lesion measuring 2.2 x 2.9 cm in the upper outer left breast. She underwent left breast 2:00 lesion core biopsy on 6/9/17 which showed invasive well-differentiated ductal carcinoma, Ej score 5/9, measuring 1.5 cm, ER 95%, [place] 10% HER-2/shaila negative. She underwent a breast MRI on 7/15/17 which showed additional concerning areas for which biopsy was recommended. She underwent left breast 3:00 MRI biopsy on 7/18/17 which showed invasive well-differentiated ductal carcinoma, measuring 0.6 cm, Ej score 5/9 ER more than 90%, [place] more than 90% HER-2/shaila negative. \par \par She underwent left mastectomy and left sentinel axillary lymph node dissection on 9/28/17 which showed invasive moderately differentiated ductal carcinoma, [place] grade 2/3, measuring 4 cm, 0.3 cm from the margin. Lymphovascular invasion was noted. Low/intermediate grade DCIS was noted. One sentinel lymph node was positive for metastasis measuring 0.3 cm. 10 additional lymph nodes were negative.\par \par s/p TC x4 completed in January.2018\par superficial phlebitis in the right arm after cycle 4 chemo. No s/f thrombus felt but noted to have focal superficial thrombosis on sono.therefore started on OS+AI instead of tamoxifen [de-identified] : \par GENETICS: CANCERNEXT AMBRY PANEL NEGATIVE \par  [FreeTextEntry1] : lupron started 2/2018\par exemestane 3/2018 [de-identified] : Ms. OSKAR SHARMA  is here for a follow up for left breast cancer on Lupron since 2/2018, and Exemestane since 3/2018.  \par Takes exemestane daily, good compliance. She reports stiffness in hip and knees, unchanged, takes gabapentin PRN. No new aches/pain, no vag dryness, no excessive fatigue. Hair loss stable with biotin. She was prescribed effexor for hot flashes but felt very tired and sleepy therefore arent using it. Hot flashes are still present but tolerable. Cold weather is helping her. She is active, no change in energy, wt or appetite. Working full time and exercising daily. LMP 11/15/17. not sexually active. Tolerating Lupron monthly. no vag spotting. Post mastectomy pain resolved. FSh 4-5 range. Doesnt want BSO. \par mammogram with Dr Lees 8/2019\par Dexa 6/2018 -1.2 Osteopenia. Takes calcium and vit D\par Dx with mild proteinuria and glaucoma, f/b PMD and opthal

## 2020-01-27 NOTE — ASSESSMENT
[Curative] : Goals of care discussed with patient: Curative [FreeTextEntry1] : This is a 48-year-old very pleasant premenopausal lady, who is diagnosed with stage IIB (T2, N1, M0) ER/MS positive and HER-2/shaila negative left breast well to moderately differentiated invasive ductal carcinoma. She is status post left mastectomy and axillary lymph node dissection on 10/20/17. Intermediate oncotype. Completed TC x 4. BRCA NEGATIVE. Consented for PALLAS and on arm B C21\par \par - Breast ca: Clinically, no evidence of disease. She is tolerating exemestane very well without significant side effects.  Reports good compliance. Plan to continue AI for total of 5-10 years. Continue lupron monthly. she will report vag bleeding spotting. Estradiol suppressed and FSH is low 5/2019. Doesnt want BSO. She is up to date with breast imaging. Follows with Dr Lees.\par - Aromatase inhibitor induced arthralgia: Mild and tolerable. Rec stretching exercises, physical therapy and ortho f/u. Gabapentin PRN\par - Hot flashes: 2/2 exemestane: Advised to wear layers and use fan prn. She could not tolerate effexor or gabpaneitn\par - Hair thinning- Likely 2/2 AI. Stable with Biotin\par - Osteopenia: concern for worsening bone density due to exemestane. Rec to  continue calcium and vit D. DEXA 1-2 yrs.\par - High cholesterol: concern for worsening cholesterol due to AI. Pt is on zocor. Lipid profile annually.\par - Low Vitamin D: concern for worsening bone loss due to exemestane and low vit D. Discussed to increase Vit D intake. Check level today\par - Post mastectomy pain- resolved\par 	\par RTC 3m per protocol

## 2020-01-27 NOTE — REASON FOR VISIT
[Follow-Up Visit] : a follow-up [Other: _____] : [unfilled] [FreeTextEntry2] : breast cancer ER/[place] +  HER-2 -

## 2020-01-27 NOTE — CONSULT LETTER
[Consult Letter:] : I had the pleasure of evaluating your patient, [unfilled]. [Dear  ___] : Dear  [unfilled], [Please see my note below.] : Please see my note below. [Sincerely,] : Sincerely, [Consult Closing:] : Thank you very much for allowing me to participate in the care of this patient.  If you have any questions, please do not hesitate to contact me. [DrTorin  ___] : Dr. HOWARD [FreeTextEntry3] : Mercedes Bennett M.D.\par  of Medicine\par [facility]\par [facility] [place]\par [address] [place] [address]\par [address]\par Tele # [phone]; Fax [phone]

## 2020-01-27 NOTE — PHYSICAL EXAM
[Fully active, able to carry on all pre-disease performance without restriction] : Status 0 - Fully active, able to carry on all pre-disease performance without restriction [Normal] : affect appropriate [de-identified] : s/p left mastectomy with reconstruction. no CW or axillary nodule. No abnormalities in the right breast .

## 2020-02-05 ENCOUNTER — OUTPATIENT (OUTPATIENT)
Dept: OUTPATIENT SERVICES | Facility: HOSPITAL | Age: 50
LOS: 1 days | End: 2020-02-05
Payer: MEDICAID

## 2020-02-05 VITALS
SYSTOLIC BLOOD PRESSURE: 130 MMHG | HEART RATE: 64 BPM | RESPIRATION RATE: 18 BRPM | OXYGEN SATURATION: 100 % | HEIGHT: 61.5 IN | WEIGHT: 145.51 LBS | DIASTOLIC BLOOD PRESSURE: 80 MMHG | TEMPERATURE: 98 F

## 2020-02-05 DIAGNOSIS — Z90.12 ACQUIRED ABSENCE OF LEFT BREAST AND NIPPLE: Chronic | ICD-10-CM

## 2020-02-05 DIAGNOSIS — Z85.3 PERSONAL HISTORY OF MALIGNANT NEOPLASM OF BREAST: ICD-10-CM

## 2020-02-05 DIAGNOSIS — N65.0 DEFORMITY OF RECONSTRUCTED BREAST: ICD-10-CM

## 2020-02-05 DIAGNOSIS — I10 ESSENTIAL (PRIMARY) HYPERTENSION: ICD-10-CM

## 2020-02-05 DIAGNOSIS — E78.5 HYPERLIPIDEMIA, UNSPECIFIED: ICD-10-CM

## 2020-02-05 DIAGNOSIS — Z01.818 ENCOUNTER FOR OTHER PREPROCEDURAL EXAMINATION: ICD-10-CM

## 2020-02-05 LAB
ANION GAP SERPL CALC-SCNC: 12 MMOL/L — SIGNIFICANT CHANGE UP (ref 5–17)
BUN SERPL-MCNC: 11 MG/DL — SIGNIFICANT CHANGE UP (ref 7–23)
CALCIUM SERPL-MCNC: 10.1 MG/DL — SIGNIFICANT CHANGE UP (ref 8.4–10.5)
CHLORIDE SERPL-SCNC: 104 MMOL/L — SIGNIFICANT CHANGE UP (ref 96–108)
CO2 SERPL-SCNC: 26 MMOL/L — SIGNIFICANT CHANGE UP (ref 22–31)
CREAT SERPL-MCNC: 0.69 MG/DL — SIGNIFICANT CHANGE UP (ref 0.5–1.3)
GLUCOSE SERPL-MCNC: 89 MG/DL — SIGNIFICANT CHANGE UP (ref 70–99)
HCT VFR BLD CALC: 42.4 % — SIGNIFICANT CHANGE UP (ref 34.5–45)
HGB BLD-MCNC: 13.4 G/DL — SIGNIFICANT CHANGE UP (ref 11.5–15.5)
MCHC RBC-ENTMCNC: 27.6 PG — SIGNIFICANT CHANGE UP (ref 27–34)
MCHC RBC-ENTMCNC: 31.6 GM/DL — LOW (ref 32–36)
MCV RBC AUTO: 87.2 FL — SIGNIFICANT CHANGE UP (ref 80–100)
NRBC # BLD: 0 /100 WBCS — SIGNIFICANT CHANGE UP (ref 0–0)
PLATELET # BLD AUTO: 278 K/UL — SIGNIFICANT CHANGE UP (ref 150–400)
POTASSIUM SERPL-MCNC: 4.1 MMOL/L — SIGNIFICANT CHANGE UP (ref 3.5–5.3)
POTASSIUM SERPL-SCNC: 4.1 MMOL/L — SIGNIFICANT CHANGE UP (ref 3.5–5.3)
RBC # BLD: 4.86 M/UL — SIGNIFICANT CHANGE UP (ref 3.8–5.2)
RBC # FLD: 13.2 % — SIGNIFICANT CHANGE UP (ref 10.3–14.5)
SODIUM SERPL-SCNC: 142 MMOL/L — SIGNIFICANT CHANGE UP (ref 135–145)
WBC # BLD: 11.02 K/UL — HIGH (ref 3.8–10.5)
WBC # FLD AUTO: 11.02 K/UL — HIGH (ref 3.8–10.5)

## 2020-02-05 PROCEDURE — 85027 COMPLETE CBC AUTOMATED: CPT

## 2020-02-05 PROCEDURE — G0463: CPT

## 2020-02-05 PROCEDURE — 80048 BASIC METABOLIC PNL TOTAL CA: CPT

## 2020-02-05 RX ORDER — CEFAZOLIN SODIUM 1 G
2000 VIAL (EA) INJECTION ONCE
Refills: 0 | Status: DISCONTINUED | OUTPATIENT
Start: 2020-02-17 | End: 2020-03-03

## 2020-02-05 RX ORDER — LOSARTAN POTASSIUM 100 MG/1
1 TABLET, FILM COATED ORAL
Qty: 0 | Refills: 0 | DISCHARGE

## 2020-02-05 RX ORDER — CHLORHEXIDINE GLUCONATE 213 G/1000ML
1 SOLUTION TOPICAL ONCE
Refills: 0 | Status: DISCONTINUED | OUTPATIENT
Start: 2020-02-17 | End: 2020-02-17

## 2020-02-05 RX ORDER — SODIUM CHLORIDE 9 MG/ML
3 INJECTION INTRAMUSCULAR; INTRAVENOUS; SUBCUTANEOUS EVERY 8 HOURS
Refills: 0 | Status: DISCONTINUED | OUTPATIENT
Start: 2020-02-17 | End: 2020-02-17

## 2020-02-05 RX ORDER — LIDOCAINE HCL 20 MG/ML
0.2 VIAL (ML) INJECTION ONCE
Refills: 0 | Status: DISCONTINUED | OUTPATIENT
Start: 2020-02-17 | End: 2020-02-17

## 2020-02-05 RX ORDER — ACETAMINOPHEN 500 MG
2 TABLET ORAL
Qty: 0 | Refills: 0 | DISCHARGE

## 2020-02-05 NOTE — H&P PST ADULT - HISTORY OF PRESENT ILLNESS
This is a  48 y/o female with history of left breast cancer, s/p left mastectomy with SE flap  2017, she presents today for left breast revision and right breast reduction mammoplasty and revision of abdominal scar

## 2020-02-05 NOTE — H&P PST ADULT - NSICDXPASTMEDICALHX_GEN_ALL_CORE_FT
PAST MEDICAL HISTORY:  Glaucoma both eyes    Hyperlipidemia     Hypertension     Malignant neoplasm left breast 2017   , had chemo

## 2020-02-05 NOTE — H&P PST ADULT - NSICDXPROBLEM_GEN_ALL_CORE_FT
PROBLEM DIAGNOSES  Problem: Deformity of reconstructed breast  Assessment and Plan: revision of left SE reconstructed breast  right reduction mammoplasty for symmetry  possible lipo suction  possible fat grafting  revision of abdominal scar    Problem: Hyperlipidemia  Assessment and Plan: continue statin    Problem: Hypertension  Assessment and Plan: continue meds

## 2020-02-10 ENCOUNTER — OUTPATIENT (OUTPATIENT)
Dept: OUTPATIENT SERVICES | Facility: HOSPITAL | Age: 50
LOS: 1 days | Discharge: HOME | End: 2020-02-10

## 2020-02-10 DIAGNOSIS — C50.912 MALIGNANT NEOPLASM OF UNSPECIFIED SITE OF LEFT FEMALE BREAST: ICD-10-CM

## 2020-02-10 DIAGNOSIS — Z90.12 ACQUIRED ABSENCE OF LEFT BREAST AND NIPPLE: Chronic | ICD-10-CM

## 2020-02-11 PROBLEM — C80.1 MALIGNANT (PRIMARY) NEOPLASM, UNSPECIFIED: Chronic | Status: ACTIVE | Noted: 2017-09-18

## 2020-02-11 PROBLEM — E78.5 HYPERLIPIDEMIA, UNSPECIFIED: Chronic | Status: ACTIVE | Noted: 2020-02-05

## 2020-02-11 PROBLEM — H40.9 UNSPECIFIED GLAUCOMA: Chronic | Status: ACTIVE | Noted: 2020-02-05

## 2020-02-13 ENCOUNTER — APPOINTMENT (OUTPATIENT)
Dept: INFUSION THERAPY | Facility: HOSPITAL | Age: 50
End: 2020-02-13

## 2020-02-17 ENCOUNTER — OUTPATIENT (OUTPATIENT)
Dept: OUTPATIENT SERVICES | Facility: HOSPITAL | Age: 50
LOS: 1 days | End: 2020-02-17
Payer: MEDICAID

## 2020-02-17 ENCOUNTER — RESULT REVIEW (OUTPATIENT)
Age: 50
End: 2020-02-17

## 2020-02-17 VITALS
WEIGHT: 145.51 LBS | SYSTOLIC BLOOD PRESSURE: 160 MMHG | TEMPERATURE: 98 F | DIASTOLIC BLOOD PRESSURE: 96 MMHG | HEART RATE: 73 BPM | HEIGHT: 61.5 IN | OXYGEN SATURATION: 100 % | RESPIRATION RATE: 17 BRPM

## 2020-02-17 DIAGNOSIS — N65.0 DEFORMITY OF RECONSTRUCTED BREAST: ICD-10-CM

## 2020-02-17 DIAGNOSIS — Z85.3 PERSONAL HISTORY OF MALIGNANT NEOPLASM OF BREAST: ICD-10-CM

## 2020-02-17 DIAGNOSIS — Z90.12 ACQUIRED ABSENCE OF LEFT BREAST AND NIPPLE: Chronic | ICD-10-CM

## 2020-02-17 LAB — HCG UR QL: NEGATIVE — SIGNIFICANT CHANGE UP

## 2020-02-17 PROCEDURE — 88305 TISSUE EXAM BY PATHOLOGIST: CPT

## 2020-02-17 PROCEDURE — 88305 TISSUE EXAM BY PATHOLOGIST: CPT | Mod: 26

## 2020-02-17 PROCEDURE — 15877 SUCTION LIPECTOMY TRUNK: CPT | Mod: 59

## 2020-02-17 PROCEDURE — 19380 REVJ RECONSTRUCTED BREAST: CPT | Mod: LT

## 2020-02-17 PROCEDURE — 88304 TISSUE EXAM BY PATHOLOGIST: CPT

## 2020-02-17 PROCEDURE — 88304 TISSUE EXAM BY PATHOLOGIST: CPT | Mod: 26

## 2020-02-17 PROCEDURE — 19318 BREAST REDUCTION: CPT | Mod: RT

## 2020-02-17 PROCEDURE — 19366: CPT | Mod: LT,59

## 2020-02-17 PROCEDURE — 81025 URINE PREGNANCY TEST: CPT

## 2020-02-17 RX ORDER — LATANOPROST 0.05 MG/ML
1 SOLUTION/ DROPS OPHTHALMIC; TOPICAL
Qty: 0 | Refills: 0 | DISCHARGE

## 2020-02-17 RX ORDER — EXEMESTANE 25 MG/1
1 TABLET, SUGAR COATED ORAL
Qty: 0 | Refills: 0 | DISCHARGE

## 2020-02-17 RX ORDER — LOSARTAN POTASSIUM 100 MG/1
1 TABLET, FILM COATED ORAL
Qty: 0 | Refills: 0 | DISCHARGE

## 2020-02-17 RX ORDER — SODIUM CHLORIDE 9 MG/ML
1000 INJECTION, SOLUTION INTRAVENOUS
Refills: 0 | Status: DISCONTINUED | OUTPATIENT
Start: 2020-02-17 | End: 2020-02-17

## 2020-02-17 RX ORDER — HYDROMORPHONE HYDROCHLORIDE 2 MG/ML
1 INJECTION INTRAMUSCULAR; INTRAVENOUS; SUBCUTANEOUS
Refills: 0 | Status: DISCONTINUED | OUTPATIENT
Start: 2020-02-17 | End: 2020-02-18

## 2020-02-17 RX ORDER — ONDANSETRON 8 MG/1
4 TABLET, FILM COATED ORAL DAILY
Refills: 0 | Status: DISCONTINUED | OUTPATIENT
Start: 2020-02-17 | End: 2020-02-18

## 2020-02-17 RX ORDER — PANTOPRAZOLE SODIUM 20 MG/1
40 TABLET, DELAYED RELEASE ORAL ONCE
Refills: 0 | Status: COMPLETED | OUTPATIENT
Start: 2020-02-17 | End: 2020-02-17

## 2020-02-17 RX ORDER — SIMVASTATIN 20 MG/1
1 TABLET, FILM COATED ORAL
Qty: 0 | Refills: 0 | DISCHARGE

## 2020-02-17 RX ORDER — HYDROMORPHONE HYDROCHLORIDE 2 MG/ML
0.5 INJECTION INTRAMUSCULAR; INTRAVENOUS; SUBCUTANEOUS
Refills: 0 | Status: DISCONTINUED | OUTPATIENT
Start: 2020-02-17 | End: 2020-02-18

## 2020-02-17 RX ORDER — AMLODIPINE BESYLATE 2.5 MG/1
1 TABLET ORAL
Qty: 0 | Refills: 0 | DISCHARGE

## 2020-02-17 RX ORDER — CEPHALEXIN 500 MG
1 CAPSULE ORAL
Qty: 15 | Refills: 0
Start: 2020-02-17 | End: 2020-02-21

## 2020-02-17 RX ORDER — METOCLOPRAMIDE HCL 10 MG
10 TABLET ORAL ONCE
Refills: 0 | Status: COMPLETED | OUTPATIENT
Start: 2020-02-17 | End: 2020-02-17

## 2020-02-17 RX ORDER — SODIUM CHLORIDE 9 MG/ML
1000 INJECTION, SOLUTION INTRAVENOUS
Refills: 0 | Status: DISCONTINUED | OUTPATIENT
Start: 2020-02-17 | End: 2020-03-03

## 2020-02-17 RX ADMIN — PANTOPRAZOLE SODIUM 40 MILLIGRAM(S): 20 TABLET, DELAYED RELEASE ORAL at 23:09

## 2020-02-17 RX ADMIN — SODIUM CHLORIDE 75 MILLILITER(S): 9 INJECTION, SOLUTION INTRAVENOUS at 23:18

## 2020-02-17 RX ADMIN — Medication 10 MILLIGRAM(S): at 23:10

## 2020-02-17 RX ADMIN — ONDANSETRON 4 MILLIGRAM(S): 8 TABLET, FILM COATED ORAL at 21:05

## 2020-02-17 NOTE — ASU DISCHARGE PLAN (ADULT/PEDIATRIC) - ASU DC SPECIAL INSTRUCTIONSFT
1. PLEASE FOLLOW UP WITH DR. ARTEAGA'S PA, FEI, THIS THURSDAY. CALL [phone] FOR AN APPOINTMENT.  2. PLEASE KEEP BRA INTACT, DO NOT REMOVE DRESSINGS.  3. PLEASE TAKE PAIN MEDICATIONS, PERCOCET, AND ANTIBIOTICS AS DIRECTED.  4. PLEASE KEEP RECORD OF THE DRAIN OUTPUT DAILY AND BRING THIS WITH YOU TO YOUR APPOINTMENT. PLEASE "STRIP" CLEAN THE TUBING TO PREVENT CLOGGING DAILY.

## 2020-02-17 NOTE — BRIEF OPERATIVE NOTE - NSICDXBRIEFPROCEDURE_GEN_ALL_CORE_FT
PROCEDURES:  Breast reduction 17-Feb-2020 20:30:50  Zhou Mcrae  Revision of reconstruction of left breast using fat graft 17-Feb-2020 20:29:14  Zhou Mcrae

## 2020-02-17 NOTE — ASU DISCHARGE PLAN (ADULT/PEDIATRIC) - CALL YOUR DOCTOR IF YOU HAVE ANY OF THE FOLLOWING:
Bleeding that does not stop/Pain not relieved by Medications/Wound/Surgical Site with redness, or foul smelling discharge or pus/Swelling that gets worse/Numbness, tingling, color or temperature change to extremity/Fever greater than (need to indicate Fahrenheit or Celsius)

## 2020-02-18 VITALS
RESPIRATION RATE: 16 BRPM | HEART RATE: 83 BPM | DIASTOLIC BLOOD PRESSURE: 74 MMHG | OXYGEN SATURATION: 98 % | TEMPERATURE: 98 F | SYSTOLIC BLOOD PRESSURE: 128 MMHG

## 2020-02-20 ENCOUNTER — APPOINTMENT (OUTPATIENT)
Dept: PLASTIC SURGERY | Facility: CLINIC | Age: 50
End: 2020-02-20
Payer: MEDICAID

## 2020-02-20 DIAGNOSIS — Z85.3 PERSONAL HISTORY OF MALIGNANT NEOPLASM OF BREAST: ICD-10-CM

## 2020-02-20 PROCEDURE — 99024 POSTOP FOLLOW-UP VISIT: CPT

## 2020-02-21 PROBLEM — Z85.3 HISTORY OF BREAST CANCER: Status: ACTIVE | Noted: 2018-04-20

## 2020-02-27 ENCOUNTER — APPOINTMENT (OUTPATIENT)
Dept: PLASTIC SURGERY | Facility: CLINIC | Age: 50
End: 2020-02-27
Payer: MEDICAID

## 2020-02-27 PROCEDURE — 99024 POSTOP FOLLOW-UP VISIT: CPT

## 2020-03-07 ENCOUNTER — OUTPATIENT (OUTPATIENT)
Dept: OUTPATIENT SERVICES | Facility: HOSPITAL | Age: 50
LOS: 1 days | Discharge: HOME | End: 2020-03-07

## 2020-03-07 DIAGNOSIS — Z90.12 ACQUIRED ABSENCE OF LEFT BREAST AND NIPPLE: Chronic | ICD-10-CM

## 2020-03-07 DIAGNOSIS — C50.912 MALIGNANT NEOPLASM OF UNSPECIFIED SITE OF LEFT FEMALE BREAST: ICD-10-CM

## 2020-03-12 ENCOUNTER — APPOINTMENT (OUTPATIENT)
Dept: INFUSION THERAPY | Facility: HOSPITAL | Age: 50
End: 2020-03-12

## 2020-03-17 ENCOUNTER — APPOINTMENT (OUTPATIENT)
Dept: PLASTIC SURGERY | Facility: CLINIC | Age: 50
End: 2020-03-17
Payer: MEDICAID

## 2020-03-17 VITALS
SYSTOLIC BLOOD PRESSURE: 167 MMHG | OXYGEN SATURATION: 97 % | HEART RATE: 65 BPM | DIASTOLIC BLOOD PRESSURE: 96 MMHG | TEMPERATURE: 98.1 F

## 2020-03-17 DIAGNOSIS — Z98.82 BREAST IMPLANT STATUS: ICD-10-CM

## 2020-03-17 PROCEDURE — 99024 POSTOP FOLLOW-UP VISIT: CPT

## 2020-03-18 PROBLEM — Z98.82 HISTORY OF BREAST RECONSTRUCTION: Status: ACTIVE | Noted: 2018-04-20

## 2020-04-03 ENCOUNTER — OUTPATIENT (OUTPATIENT)
Dept: OUTPATIENT SERVICES | Facility: HOSPITAL | Age: 50
LOS: 1 days | Discharge: HOME | End: 2020-04-03

## 2020-04-03 DIAGNOSIS — C50.912 MALIGNANT NEOPLASM OF UNSPECIFIED SITE OF LEFT FEMALE BREAST: ICD-10-CM

## 2020-04-03 DIAGNOSIS — Z90.12 ACQUIRED ABSENCE OF LEFT BREAST AND NIPPLE: Chronic | ICD-10-CM

## 2020-04-06 ENCOUNTER — APPOINTMENT (OUTPATIENT)
Dept: SURGICAL ONCOLOGY | Facility: CLINIC | Age: 50
End: 2020-04-06

## 2020-04-07 ENCOUNTER — APPOINTMENT (OUTPATIENT)
Dept: HEMATOLOGY ONCOLOGY | Facility: CLINIC | Age: 50
End: 2020-04-07
Payer: MEDICAID

## 2020-04-07 PROCEDURE — 99442: CPT

## 2020-04-09 ENCOUNTER — APPOINTMENT (OUTPATIENT)
Dept: INFUSION THERAPY | Facility: HOSPITAL | Age: 50
End: 2020-04-09

## 2020-04-22 ENCOUNTER — APPOINTMENT (OUTPATIENT)
Dept: PLASTIC SURGERY | Facility: CLINIC | Age: 50
End: 2020-04-22

## 2020-05-04 ENCOUNTER — APPOINTMENT (OUTPATIENT)
Dept: HEMATOLOGY ONCOLOGY | Facility: CLINIC | Age: 50
End: 2020-05-04

## 2020-05-08 ENCOUNTER — APPOINTMENT (OUTPATIENT)
Dept: INFUSION THERAPY | Facility: HOSPITAL | Age: 50
End: 2020-05-08

## 2020-05-26 ENCOUNTER — OUTPATIENT (OUTPATIENT)
Dept: OUTPATIENT SERVICES | Facility: HOSPITAL | Age: 50
LOS: 1 days | Discharge: HOME | End: 2020-05-26

## 2020-05-26 DIAGNOSIS — C50.912 MALIGNANT NEOPLASM OF UNSPECIFIED SITE OF LEFT FEMALE BREAST: ICD-10-CM

## 2020-05-26 DIAGNOSIS — Z90.12 ACQUIRED ABSENCE OF LEFT BREAST AND NIPPLE: Chronic | ICD-10-CM

## 2020-05-29 ENCOUNTER — OUTPATIENT (OUTPATIENT)
Dept: OUTPATIENT SERVICES | Facility: HOSPITAL | Age: 50
LOS: 1 days | End: 2020-05-29
Payer: MEDICAID

## 2020-05-29 ENCOUNTER — RESULT REVIEW (OUTPATIENT)
Age: 50
End: 2020-05-29

## 2020-05-29 ENCOUNTER — APPOINTMENT (OUTPATIENT)
Dept: ULTRASOUND IMAGING | Facility: IMAGING CENTER | Age: 50
End: 2020-05-29
Payer: MEDICAID

## 2020-05-29 ENCOUNTER — APPOINTMENT (OUTPATIENT)
Dept: HEMATOLOGY ONCOLOGY | Facility: CLINIC | Age: 50
End: 2020-05-29
Payer: MEDICAID

## 2020-05-29 VITALS
WEIGHT: 148.15 LBS | OXYGEN SATURATION: 99 % | BODY MASS INDEX: 22.53 KG/M2 | RESPIRATION RATE: 18 BRPM | SYSTOLIC BLOOD PRESSURE: 166 MMHG | DIASTOLIC BLOOD PRESSURE: 93 MMHG | TEMPERATURE: 98.6 F | HEART RATE: 70 BPM

## 2020-05-29 DIAGNOSIS — Z90.12 ACQUIRED ABSENCE OF LEFT BREAST AND NIPPLE: Chronic | ICD-10-CM

## 2020-05-29 DIAGNOSIS — Z08 ENCOUNTER FOR FOLLOW-UP EXAMINATION AFTER COMPLETED TREATMENT FOR MALIGNANT NEOPLASM: ICD-10-CM

## 2020-05-29 DIAGNOSIS — Z00.8 ENCOUNTER FOR OTHER GENERAL EXAMINATION: ICD-10-CM

## 2020-05-29 DIAGNOSIS — N65.0 DEFORMITY OF RECONSTRUCTED BREAST: ICD-10-CM

## 2020-05-29 PROCEDURE — 99214 OFFICE O/P EST MOD 30 MIN: CPT

## 2020-05-29 PROCEDURE — 76642 ULTRASOUND BREAST LIMITED: CPT

## 2020-05-29 PROCEDURE — 76642 ULTRASOUND BREAST LIMITED: CPT | Mod: 26,RT

## 2020-05-29 NOTE — OB HISTORY
[Definite:  ___ (Date)] : the last menstrual period was [unfilled] [Regular Cycle Intervals] : periods have been regular [Normal Amount/Duration] : was of a normal amount and duration [___] : Living: [unfilled]

## 2020-06-02 ENCOUNTER — APPOINTMENT (OUTPATIENT)
Dept: RADIOLOGY | Facility: IMAGING CENTER | Age: 50
End: 2020-06-02
Payer: MEDICAID

## 2020-06-02 ENCOUNTER — OUTPATIENT (OUTPATIENT)
Dept: OUTPATIENT SERVICES | Facility: HOSPITAL | Age: 50
LOS: 1 days | End: 2020-06-02
Payer: MEDICAID

## 2020-06-02 DIAGNOSIS — C50.912 MALIGNANT NEOPLASM OF UNSPECIFIED SITE OF LEFT FEMALE BREAST: ICD-10-CM

## 2020-06-02 DIAGNOSIS — Z90.12 ACQUIRED ABSENCE OF LEFT BREAST AND NIPPLE: Chronic | ICD-10-CM

## 2020-06-02 PROCEDURE — 77080 DXA BONE DENSITY AXIAL: CPT

## 2020-06-02 PROCEDURE — 77080 DXA BONE DENSITY AXIAL: CPT | Mod: 26

## 2020-06-05 ENCOUNTER — APPOINTMENT (OUTPATIENT)
Dept: INFUSION THERAPY | Facility: HOSPITAL | Age: 50
End: 2020-06-05

## 2020-06-13 NOTE — HISTORY OF PRESENT ILLNESS
[Date: ____________] : Patient's last distress assessment performed on [unfilled]. [6 - Distress Level] : Distress Level: 6 [Patient given social work contact information and resource sheet] : Patient was given social work contact information and resource sheet [IIB] : IIB [de-identified] : This is a very pleasant 48- year-old premenopausal lady who was diagnosed with breast cancer and is here for a consultation today. Her oncologic history is follows:\par \par She underwent routine breast imaging on 5/4/17 which showed a spiculated lesion measuring 2.2 x 2.9 cm in the upper outer left breast. She underwent left breast 2:00 lesion core biopsy on 6/9/17 which showed invasive well-differentiated ductal carcinoma, Ej score 5/9, measuring 1.5 cm, ER 95%, [place] 10% HER-2/shaila negative. She underwent a breast MRI on 7/15/17 which showed additional concerning areas for which biopsy was recommended. She underwent left breast 3:00 MRI biopsy on 7/18/17 which showed invasive well-differentiated ductal carcinoma, measuring 0.6 cm, Ej score 5/9 ER more than 90%, [place] more than 90% HER-2/shaila negative. \par \par She underwent left mastectomy and left sentinel axillary lymph node dissection on 9/28/17 which showed invasive moderately differentiated ductal carcinoma, [place] grade 2/3, measuring 4 cm, 0.3 cm from the margin. Lymphovascular invasion was noted. Low/intermediate grade DCIS was noted. One sentinel lymph node was positive for metastasis measuring 0.3 cm. 10 additional lymph nodes were negative.\par \par s/p TC x4 completed in January.2018\par superficial phlebitis in the right arm after cycle 4 chemo. No s/f thrombus felt but noted to have focal superficial thrombosis on sono.therefore started on OS+AI instead of tamoxifen [de-identified] : \par GENETICS: CANCERNEXT AMBRY PANEL NEGATIVE \par  [FreeTextEntry1] : lupron started 2/2018\par exemestane 3/2018 [de-identified] : Ms. OSKAR SHARMA  is here for a follow up for left breast cancer on Lupron since 2/2018, and Exemestane since 3/2018.  \par Takes exemestane daily, good compliance. She reports stiffness in hip and knees, unchanged, takes gabapentin PRN. No new aches/pain, no vag dryness, no excessive fatigue. Hair loss stable with biotin. She was prescribed effexor for hot flashes but felt very tired and sleepy therefore arent using it. Hot flashes are still present but tolerable. She is active, no change in energy, wt or appetite. Working full time and exercising daily. LMP 11/15/17. not sexually active. Tolerating Lupron monthly. no vag spotting. Post mastectomy pain resolved. FSh 4-5 range. Doesn't want BSO. \par mammogram with Dr Lees 6/2020\par Dexa 6/2018 -1.2 Osteopenia. Takes calcium and vit D Due for 2020 will order today.\par Dx with mild proteinuria and glaucoma, f/b PMD and opthal\par 5/29/ 2020\par  Had reconstruction surgery 2/2020 no pain presently, Mild redness to rt breast around nipple rt friction from bra, no nipple discharge or s/s of infection.  starting to get feeling back.  Takes exemestane daily, reports ongoing hot flashes  tolerable doesn't use effexor due to s/e, also reports hair thinning but stable continues to biotin. Switched to Lupron q 3 months during COVID pandemic Tolerating well, No vag bleeding

## 2020-06-13 NOTE — CONSULT LETTER
[Dear  ___] : Dear  [unfilled], [Consult Letter:] : I had the pleasure of evaluating your patient, [unfilled]. [Please see my note below.] : Please see my note below. [Consult Closing:] : Thank you very much for allowing me to participate in the care of this patient.  If you have any questions, please do not hesitate to contact me. [Sincerely,] : Sincerely, [DrTorin  ___] : Dr. HOWARD [FreeTextEntry3] : Mercedes Bennett M.D.\par  of Medicine\par [facility]\par [facility] [place]\par [address] [place] [address]\par [address]\par Tele # [phone]; Fax [phone]

## 2020-06-13 NOTE — REASON FOR VISIT
[Follow-Up Visit] : a follow-up [Other: _____] : [unfilled] [FreeTextEntry2] : breast cancer ER/[place] +  HER-2 -

## 2020-06-13 NOTE — PHYSICAL EXAM
[Fully active, able to carry on all pre-disease performance without restriction] : Status 0 - Fully active, able to carry on all pre-disease performance without restriction [Normal] : affect appropriate [de-identified] : s/p left mastectomy with reconstruction. no CW or axillary nodule. s/p rt breast reductiom mammoplasty for symmetry. Mild periareolar erythema noted to suture and to sutre line under breast most likely 2/2 bra. No dischage or pain

## 2020-06-13 NOTE — ASSESSMENT
[Curative] : Goals of care discussed with patient: Curative [FreeTextEntry1] : This is a 48-year-old very pleasant premenopausal lady, who is diagnosed with stage IIB (T2, N1, M0) ER/[place] positive and HER-2/shaila negative left breast well to moderately differentiated invasive ductal carcinoma. She is status post left mastectomy and axillary lymph node dissection on 10/20/17. Intermediate oncotype. Completed TC x 4. BRCA NEGATIVE. Consented for PALLAS and on arm B C21\par \par - Breast ca: Clinically, no evidence of disease. She is tolerating exemestane very well without significant side effects.  Reports good compliance. Plan to continue AI for total of 5-10 years. Continue lupron monthly. she will report vag bleeding spotting. Estradiol suppressed and FSH is low 5/2019. Doesn't want BSO. She is up to date with breast imaging. Follows with Dr Lees.\par - Aromatase inhibitor induced arthralgia: Mild and tolerable. Rec stretching exercises, physical therapy and ortho f/u. Gabapentin PRN\par - Hot flashes: 2/2 exemestane: Advised to wear layers and use fan prn. She could not tolerate effexor, but takes gabapentin prn.\par - Hair thinning- Likely 2/2 AI. Stable with Biotin\par - Osteopenia: concern for worsening bone density due to exemestane. Rec to  continue calcium and vit D. DEXA 1-2 yrs. ordered today for 6/2020\par - High cholesterol: concern for worsening cholesterol due to AI. Pt is on zocor. Lipid profile annually.\par - Low Vitamin D: concern for worsening bone loss due to exemestane and low vit D. Discussed to increase Vit D intake. Check level today\par - Post mastectomy pain- resolved\par - BW as per PALLAS protocol \par 	\par RTC 6m per protocol

## 2020-06-17 ENCOUNTER — OUTPATIENT (OUTPATIENT)
Dept: OUTPATIENT SERVICES | Facility: HOSPITAL | Age: 50
LOS: 1 days | End: 2020-06-17
Payer: MEDICAID

## 2020-06-17 ENCOUNTER — APPOINTMENT (OUTPATIENT)
Dept: OBGYN | Facility: CLINIC | Age: 50
End: 2020-06-17
Payer: MEDICAID

## 2020-06-17 ENCOUNTER — RESULT REVIEW (OUTPATIENT)
Age: 50
End: 2020-06-17

## 2020-06-17 VITALS
OXYGEN SATURATION: 100 % | RESPIRATION RATE: 18 BRPM | BODY MASS INDEX: 27.23 KG/M2 | SYSTOLIC BLOOD PRESSURE: 139 MMHG | HEIGHT: 62 IN | DIASTOLIC BLOOD PRESSURE: 84 MMHG | WEIGHT: 148 LBS | TEMPERATURE: 98.8 F | HEART RATE: 66 BPM

## 2020-06-17 DIAGNOSIS — Z90.12 ACQUIRED ABSENCE OF LEFT BREAST AND NIPPLE: Chronic | ICD-10-CM

## 2020-06-17 DIAGNOSIS — Z13.71 ENCOUNTER FOR NONPROCREATIVE SCREENING FOR GENETIC DISEASE CARRIER STATUS: ICD-10-CM

## 2020-06-17 DIAGNOSIS — Z78.0 ASYMPTOMATIC MENOPAUSAL STATE: ICD-10-CM

## 2020-06-17 DIAGNOSIS — Z01.419 ENCOUNTER FOR GYNECOLOGICAL EXAMINATION (GENERAL) (ROUTINE) W/OUT ABNORMAL FINDINGS: ICD-10-CM

## 2020-06-17 DIAGNOSIS — Z11.3 ENCOUNTER FOR SCREENING FOR INFECTIONS WITH A PREDOMINANTLY SEXUAL MODE OF TRANSMISSION: ICD-10-CM

## 2020-06-17 DIAGNOSIS — Z86.79 PERSONAL HISTORY OF OTHER DISEASES OF THE CIRCULATORY SYSTEM: ICD-10-CM

## 2020-06-17 DIAGNOSIS — Z00.00 ENCOUNTER FOR GENERAL ADULT MEDICAL EXAMINATION WITHOUT ABNORMAL FINDINGS: ICD-10-CM

## 2020-06-17 PROCEDURE — 99396 PREV VISIT EST AGE 40-64: CPT

## 2020-06-17 PROCEDURE — 87624 HPV HI-RISK TYP POOLED RSLT: CPT

## 2020-06-17 PROCEDURE — G0463: CPT

## 2020-06-17 PROCEDURE — 88175 CYTOPATH C/V AUTO FLUID REDO: CPT

## 2020-06-17 NOTE — COUNSELING
[Nutrition] : nutrition [Exercise] : exercise [Vitamins/Supplements] : vitamins/supplements [STD (testing, results, tx)] : STD (testing, results, tx)

## 2020-06-17 NOTE — PHYSICAL EXAM
[Awake] : awake [Alert] : not alert [Acute Distress] : no acute distress [Soft] : not soft [Tender] : non tender [Distended] : not distended [Scar] : a scar was noted [Suprapubic] : in the suprapubic area [Soft, Nontender] : the abdomen was soft and nontender [Depressed Mood] : not depressed [Oriented x3] : oriented to person, place, and time [Flat Affect] : affect not flat [Normal] : uterus [Labia Majora] : labia major [Labia Minora] : labia minora [Cystocele] : a cystocele [Rectocele] : a rectocele [Discharge] : had no discharge [Pap Obtained] : a Pap smear was performed [Retroversion] : retroverted [Tenderness] : nontender [Enlarged ___ wks] : not enlarged

## 2020-06-17 NOTE — HISTORY OF PRESENT ILLNESS
[Good] : being in good health [___ Month(s) Ago] : [unfilled] month(s) ago [Healthy Diet] : a healthy diet [Regular Exercise] : regular exercise [Last Mammogram ___] : Last Mammogram was [unfilled] [Last Bone Density ___] : Last bone density studies [unfilled] [Last Pap ___] : Last cervical pap smear was [unfilled] [Postmenopausal] : is postmenopausal [Pregnancy History] : pregnancy history: [Approximately ___ (Month)] : the LMP was approximately [unfilled] month(s) ago [Post-Menopause, No Sxs] : post-menopausal, currently without symptoms [Weight Concerns] : no concerns with her weight [Menstrual Problems] : reports normal menses [Contraception] : does not use contraception [Sexually Active] : is not sexually active

## 2020-06-17 NOTE — CHIEF COMPLAINT
[Initial Visit] : initial GYN visit [FreeTextEntry1] : Annual \par \par  ~ 3 years, denies hx/o postmenopausal bleeding.\par  \par (+) Left Breast Ductal Carcinoma(07/18/2017), s/p Chemotherapy completed (01/2018),  s/p B/L Breast surgery (02/17/2020); Taking Exemestane / Lupron treatment since (2018)

## 2020-06-17 NOTE — PROCEDURE
[Cervical Pap Smear] : cervical Pap smear [Tolerated Well] : the patient tolerated the procedure well [Liquid Base] : liquid base [GC & Chlamydia via Pap] : GC & Chlamydia via Pap [No Complications] : there were no complications

## 2020-06-25 ENCOUNTER — APPOINTMENT (OUTPATIENT)
Dept: NEPHROLOGY | Facility: CLINIC | Age: 50
End: 2020-06-25

## 2020-07-08 ENCOUNTER — OUTPATIENT (OUTPATIENT)
Dept: OUTPATIENT SERVICES | Facility: HOSPITAL | Age: 50
LOS: 1 days | Discharge: HOME | End: 2020-07-08

## 2020-07-08 DIAGNOSIS — C50.912 MALIGNANT NEOPLASM OF UNSPECIFIED SITE OF LEFT FEMALE BREAST: ICD-10-CM

## 2020-07-08 DIAGNOSIS — Z90.12 ACQUIRED ABSENCE OF LEFT BREAST AND NIPPLE: Chronic | ICD-10-CM

## 2020-07-09 ENCOUNTER — APPOINTMENT (OUTPATIENT)
Dept: INFUSION THERAPY | Facility: HOSPITAL | Age: 50
End: 2020-07-09

## 2020-07-10 DIAGNOSIS — C79.51 SECONDARY MALIGNANT NEOPLASM OF BONE: ICD-10-CM

## 2020-07-28 ENCOUNTER — APPOINTMENT (OUTPATIENT)
Dept: NEPHROLOGY | Facility: CLINIC | Age: 50
End: 2020-07-28
Payer: MEDICAID

## 2020-07-28 VITALS
WEIGHT: 149 LBS | DIASTOLIC BLOOD PRESSURE: 93 MMHG | BODY MASS INDEX: 27.25 KG/M2 | SYSTOLIC BLOOD PRESSURE: 153 MMHG | HEART RATE: 83 BPM | OXYGEN SATURATION: 98 %

## 2020-07-28 VITALS — SYSTOLIC BLOOD PRESSURE: 146 MMHG | DIASTOLIC BLOOD PRESSURE: 88 MMHG

## 2020-07-28 PROCEDURE — 99213 OFFICE O/P EST LOW 20 MIN: CPT

## 2020-07-28 RX ORDER — LOSARTAN POTASSIUM 100 MG/1
100 TABLET, FILM COATED ORAL DAILY
Qty: 90 | Refills: 3 | Status: ACTIVE | COMMUNITY
Start: 1900-01-01 | End: 1900-01-01

## 2020-07-28 NOTE — ASSESSMENT
[FreeTextEntry1] : 51 yo female with breast cancer, HTN, borderline DM, hyperlipidemia with left renal artery aneurysm\par Breast cancer following with Cedric closely\par HTN: Has reactive component.  Blood pressure at home is very well controlled\par Continue losartan dose 100mg and amlodipine 2.5 mg daily at bedtime\par Need a lower Na diet now. \par Renal artery aneurysm : will need to be evaluated with Albina Lorenzana - advised she call for appt for repeat imaging

## 2020-07-28 NOTE — HISTORY OF PRESENT ILLNESS
[FreeTextEntry1] : 49 yo female with HTN,  left renal artery aneurysm, breast cancer\par She is on exemestane with maryan\par Has not seen Dr Florian or IR regarding her renal artery aneurysm. No recent CTA\par She is still working\par She had reconstruction repair\par Her blood pressures at home have been controlled.

## 2020-07-28 NOTE — PHYSICAL EXAM
[General Appearance - Alert] : alert [General Appearance - In No Acute Distress] : in no acute distress [Sclera] : the sclera and conjunctiva were normal [Neck Appearance] : the appearance of the neck was normal [Auscultation Breath Sounds / Voice Sounds] : lungs were clear to auscultation bilaterally [Heart Rate And Rhythm] : heart rate was normal and rhythm regular [Heart Sounds] : normal S1 and S2 [Murmurs] : no murmurs [Heart Sounds Pericardial Friction Rub] : no pericardial rub [Edema] : there was no peripheral edema [Bowel Sounds] : normal bowel sounds [Abdomen Soft] : soft [No CVA Tenderness] : no ~M costovertebral angle tenderness [Involuntary Movements] : no involuntary movements were seen [] : no rash [No Focal Deficits] : no focal deficits [Oriented To Time, Place, And Person] : oriented to person, place, and time [Affect] : the affect was normal [Mood] : the mood was normal

## 2020-08-11 ENCOUNTER — APPOINTMENT (OUTPATIENT)
Dept: VASCULAR SURGERY | Facility: CLINIC | Age: 50
End: 2020-08-11
Payer: MEDICAID

## 2020-08-11 VITALS
WEIGHT: 149 LBS | HEART RATE: 69 BPM | BODY MASS INDEX: 27.42 KG/M2 | HEIGHT: 62 IN | DIASTOLIC BLOOD PRESSURE: 93 MMHG | SYSTOLIC BLOOD PRESSURE: 157 MMHG

## 2020-08-11 VITALS — TEMPERATURE: 98 F

## 2020-08-11 PROCEDURE — 99213 OFFICE O/P EST LOW 20 MIN: CPT

## 2020-08-11 PROCEDURE — 93975 VASCULAR STUDY: CPT

## 2020-08-11 NOTE — PHYSICAL EXAM
[Abdominal Aorta] : Normal abdominal aorta [2+] : left 2+ [No Rash or Lesion] : No rash or lesion [Alert] : alert [Calm] : calm [JVD] : no jugular venous distention  [Ankle Swelling (On Exam)] : not present [Varicose Veins Of Lower Extremities] : not present [] : not present [Skin Ulcer] : no ulcer [de-identified] : appears well

## 2020-08-11 NOTE — HISTORY OF PRESENT ILLNESS
[FreeTextEntry1] : 49 yo female with history of breast ca s/p excision chemo and reconstruction, htn now controlled presents for follow up of left sided renal artery aneurysm\par pt denies any history of left sided flank pain, hematuria or worsening of htn.

## 2020-08-11 NOTE — ASSESSMENT
[FreeTextEntry1] : 49 yo female with history of breast ca s/p excision chemo and reconstruction, htn now controlled presents for follow up of left sided renal artery aneurysm\par \par renal duplex difficult to visualize renal artery aneurysm \par will arrange for cta of the abdomen and pelvis \par \par pt to follow up after ct scan completed

## 2020-08-25 ENCOUNTER — OUTPATIENT (OUTPATIENT)
Dept: OUTPATIENT SERVICES | Facility: HOSPITAL | Age: 50
LOS: 1 days | End: 2020-08-25
Payer: MEDICAID

## 2020-08-25 ENCOUNTER — RESULT REVIEW (OUTPATIENT)
Age: 50
End: 2020-08-25

## 2020-08-25 ENCOUNTER — APPOINTMENT (OUTPATIENT)
Dept: CT IMAGING | Facility: IMAGING CENTER | Age: 50
End: 2020-08-25
Payer: MEDICAID

## 2020-08-25 DIAGNOSIS — Z90.12 ACQUIRED ABSENCE OF LEFT BREAST AND NIPPLE: Chronic | ICD-10-CM

## 2020-08-25 DIAGNOSIS — Z00.8 ENCOUNTER FOR OTHER GENERAL EXAMINATION: ICD-10-CM

## 2020-08-25 DIAGNOSIS — I72.2 ANEURYSM OF RENAL ARTERY: ICD-10-CM

## 2020-08-25 PROCEDURE — 74174 CTA ABD&PLVS W/CONTRAST: CPT | Mod: 26

## 2020-08-25 PROCEDURE — 74174 CTA ABD&PLVS W/CONTRAST: CPT

## 2020-08-25 PROCEDURE — 82565 ASSAY OF CREATININE: CPT

## 2020-09-05 NOTE — REASON FOR VISIT
urine sent to lab at this time, 22 guage IV places in right AC at this time, 
Normal saline IV fluids inprogress at this time [Other: _____] : [unfilled] [FreeTextEntry2] : breast cancer ER/MO +  HER-2 -

## 2020-09-11 ENCOUNTER — NON-APPOINTMENT (OUTPATIENT)
Age: 50
End: 2020-09-11

## 2020-09-25 ENCOUNTER — OUTPATIENT (OUTPATIENT)
Dept: OUTPATIENT SERVICES | Facility: HOSPITAL | Age: 50
LOS: 1 days | Discharge: HOME | End: 2020-09-25

## 2020-09-25 DIAGNOSIS — C50.912 MALIGNANT NEOPLASM OF UNSPECIFIED SITE OF LEFT FEMALE BREAST: ICD-10-CM

## 2020-09-25 DIAGNOSIS — Z90.12 ACQUIRED ABSENCE OF LEFT BREAST AND NIPPLE: Chronic | ICD-10-CM

## 2020-10-02 ENCOUNTER — APPOINTMENT (OUTPATIENT)
Dept: INFUSION THERAPY | Facility: HOSPITAL | Age: 50
End: 2020-10-02

## 2020-10-02 ENCOUNTER — APPOINTMENT (OUTPATIENT)
Dept: HEMATOLOGY ONCOLOGY | Facility: CLINIC | Age: 50
End: 2020-10-02
Payer: MEDICAID

## 2020-10-02 ENCOUNTER — RESULT REVIEW (OUTPATIENT)
Age: 50
End: 2020-10-02

## 2020-10-02 ENCOUNTER — APPOINTMENT (OUTPATIENT)
Dept: HEMATOLOGY ONCOLOGY | Facility: CLINIC | Age: 50
End: 2020-10-02

## 2020-10-02 VITALS
HEART RATE: 64 BPM | WEIGHT: 149.69 LBS | BODY MASS INDEX: 27.2 KG/M2 | TEMPERATURE: 98 F | SYSTOLIC BLOOD PRESSURE: 149 MMHG | OXYGEN SATURATION: 100 % | HEIGHT: 62.01 IN | DIASTOLIC BLOOD PRESSURE: 88 MMHG | RESPIRATION RATE: 16 BRPM

## 2020-10-02 LAB
BASOPHILS # BLD AUTO: 0.05 K/UL — SIGNIFICANT CHANGE UP (ref 0–0.2)
BASOPHILS NFR BLD AUTO: 0.5 % — SIGNIFICANT CHANGE UP (ref 0–2)
EOSINOPHIL # BLD AUTO: 0.12 K/UL — SIGNIFICANT CHANGE UP (ref 0–0.5)
EOSINOPHIL NFR BLD AUTO: 1.3 % — SIGNIFICANT CHANGE UP (ref 0–6)
HCT VFR BLD CALC: 39 % — SIGNIFICANT CHANGE UP (ref 34.5–45)
HGB BLD-MCNC: 12.4 G/DL — SIGNIFICANT CHANGE UP (ref 11.5–15.5)
IMM GRANULOCYTES NFR BLD AUTO: 0.5 % — SIGNIFICANT CHANGE UP (ref 0–1.5)
LYMPHOCYTES # BLD AUTO: 2.84 K/UL — SIGNIFICANT CHANGE UP (ref 1–3.3)
LYMPHOCYTES # BLD AUTO: 29.6 % — SIGNIFICANT CHANGE UP (ref 13–44)
MCHC RBC-ENTMCNC: 28.4 PG — SIGNIFICANT CHANGE UP (ref 27–34)
MCHC RBC-ENTMCNC: 31.8 G/DL — LOW (ref 32–36)
MCV RBC AUTO: 89.4 FL — SIGNIFICANT CHANGE UP (ref 80–100)
MONOCYTES # BLD AUTO: 0.45 K/UL — SIGNIFICANT CHANGE UP (ref 0–0.9)
MONOCYTES NFR BLD AUTO: 4.7 % — SIGNIFICANT CHANGE UP (ref 2–14)
NEUTROPHILS # BLD AUTO: 6.08 K/UL — SIGNIFICANT CHANGE UP (ref 1.8–7.4)
NEUTROPHILS NFR BLD AUTO: 63.4 % — SIGNIFICANT CHANGE UP (ref 43–77)
NRBC # BLD: 0 /100 WBCS — SIGNIFICANT CHANGE UP (ref 0–0)
PLATELET # BLD AUTO: 236 K/UL — SIGNIFICANT CHANGE UP (ref 150–400)
RBC # BLD: 4.36 M/UL — SIGNIFICANT CHANGE UP (ref 3.8–5.2)
RBC # FLD: 13.7 % — SIGNIFICANT CHANGE UP (ref 10.3–14.5)
WBC # BLD: 9.59 K/UL — SIGNIFICANT CHANGE UP (ref 3.8–10.5)
WBC # FLD AUTO: 9.59 K/UL — SIGNIFICANT CHANGE UP (ref 3.8–10.5)

## 2020-10-02 PROCEDURE — 99215 OFFICE O/P EST HI 40 MIN: CPT

## 2020-10-03 NOTE — CONSULT LETTER
[Dear  ___] : Dear  [unfilled], [Consult Letter:] : I had the pleasure of evaluating your patient, [unfilled]. [Please see my note below.] : Please see my note below. [Consult Closing:] : Thank you very much for allowing me to participate in the care of this patient.  If you have any questions, please do not hesitate to contact me. [Sincerely,] : Sincerely, [DrTorin  ___] : Dr. HOWARD [FreeTextEntry3] : Mercedes Bennett M.D.\par  of Medicine\par [facility]\par [facility]\par [address] [place] [address]\par [address]\par Tele # [phone]; Fax [phone]

## 2020-10-03 NOTE — ASSESSMENT
[Curative] : Goals of care discussed with patient: Curative [FreeTextEntry1] : This is a 48-year-old very pleasant premenopausal lady, who is diagnosed with stage IIB (T2, N1, M0) ER/NE positive and HER-2/shaila negative left breast well to moderately differentiated invasive ductal carcinoma. She is status post left mastectomy and axillary lymph node dissection on 10/20/17. Intermediate oncotype. Completed TC x 4. BRCA NEGATIVE. Consented for PALLAS and on arm B C21\par \par - Breast ca:  She is tolerating exemestane very well without significant side effects.  Reports good compliance. Plan to continue AI for total of 5-10 years. Continue lupron monthly. she will report vag bleeding spotting. Estradiol suppressed and FSH is low 5/2019. Doesn't want BSO. She is up to date with breast imaging. Follows with Dr Lees.\par - Aromatase inhibitor induced arthralgia: Mild and tolerable. Rec stretching exercises, physical therapy and ortho f/u. Reminded to restart Gabapentin PRN\par - Gen body pain- check bone scan to r/o mets\par - Hot flashes: 2/2 exemestane: Advised to wear layers and use fan prn. She could not tolerate effexor, but takes gabapentin prn.\par - Hair thinning- Likely 2/2 AI. Stable with Biotin\par - Osteoporosis: concern for worsening bone density due to exemestane. Rec to  continue calcium and vit D. DEXA 1-2 yrs. Started prolia 7/2020, next 1/2021. dental PRN\par - High cholesterol: concern for worsening cholesterol due to AI. Pt is on zocor. Lipid profile annually.\par - Low Vitamin D: concern for worsening bone loss due to exemestane and low vit D. Discussed to increase Vit D intake. Check level today\par - Post mastectomy pain- resolved\par - BW as per PALLAS protocol \par 	\par RTC 3m per protocol\par lupron qm \par prolia 1/2021

## 2020-10-03 NOTE — REASON FOR VISIT
[Follow-Up Visit] : a follow-up [Other: _____] : [unfilled] [FreeTextEntry2] : breast cancer ER/TX +  HER-2 -

## 2020-10-03 NOTE — PHYSICAL EXAM
[Fully active, able to carry on all pre-disease performance without restriction] : Status 0 - Fully active, able to carry on all pre-disease performance without restriction [Normal] : affect appropriate [de-identified] : s/p left mastectomy with reconstruction. no CW or axillary nodule. s/p rt breast reduction mammoplasty for symmetry. Mild periareolar erythema 2/2 surgery- chronic

## 2020-10-03 NOTE — HISTORY OF PRESENT ILLNESS
[Date: ____________] : Patient's last distress assessment performed on [unfilled]. [6 - Distress Level] : Distress Level: 6 [Patient given social work contact information and resource sheet] : Patient was given social work contact information and resource sheet [IIB] : IIB [de-identified] : This is a very pleasant 48- year-old premenopausal lady who was diagnosed with breast cancer and is here for a consultation today. Her oncologic history is follows:\par \par She underwent routine breast imaging on 5/4/17 which showed a spiculated lesion measuring 2.2 x 2.9 cm in the upper outer left breast. She underwent left breast 2:00 lesion core biopsy on 6/9/17 which showed invasive well-differentiated ductal carcinoma, Ej score 5/9, measuring 1.5 cm, ER 95%, AK 10% HER-2/shaila negative. She underwent a breast MRI on 7/15/17 which showed additional concerning areas for which biopsy was recommended. She underwent left breast 3:00 MRI biopsy on 7/18/17 which showed invasive well-differentiated ductal carcinoma, measuring 0.6 cm, Ej score 5/9 ER more than 90%, AK more than 90% HER-2/shaila negative. \par \par She underwent left mastectomy and left sentinel axillary lymph node dissection on 9/28/17 which showed invasive moderately differentiated ductal carcinoma, Al[place] grade 2/3, measuring 4 cm, 0.3 cm from the margin. Lymphovascular invasion was noted. Low/intermediate grade DCIS was noted. One sentinel lymph node was positive for metastasis measuring 0.3 cm. 10 additional lymph nodes were negative.\par \par s/p TC x4 completed in January.2018\par superficial phlebitis in the right arm after cycle 4 chemo. No s/f thrombus felt but noted to have focal superficial thrombosis on sono.therefore started on OS+AI instead of tamoxifen [de-identified] : \par GENETICS: CANCERNEXT AMBRY PANEL NEGATIVE \par  [FreeTextEntry1] : lupron started 2/2018\par exemestane 3/2018 [de-identified] : Ms. OSKAR SHARMA  is here for a follow up for left breast cancer on Lupron since 2/2018, and Exemestane since 3/2018.  \par Takes exemestane daily, good compliance. \par Today, she is c/o gen body pain. Hip pain has gotten worse x 1 m. + FATIGUE X 1 M. Will order bone scan. She was on gabapentin for insomnia, hot flashes and pain which she stopped 3 m ago. Discussed to restart gabapentin. She was prescribed effexor for hot flashes but felt very tired and sleepy therefore arent using it. Hot flashes are still present but tolerable. She is active, no change in energy, wt or appetite.  No vag dryness, no excessive fatigue. Hair loss stable with biotin. \par Working full time and exercising daily. LMP 11/15/17. not sexually active. Tolerating Lupron monthly. no vag spotting. FSh 4-5 range. Doesn't want BSO. \par mammogram with Dr Lees 6/2020. She has left mastectomy and right reduction. \par Dexa 6/2020 OSTEOPOROSIS, started prolia in july 2020. Next dose 1/2021 Takes calcium and vit D \par Tolerating monthly lupron w/o issues\par ct a/p 8/2020: Stable renal artery aneyrsym

## 2020-10-09 ENCOUNTER — APPOINTMENT (OUTPATIENT)
Dept: INTERVENTIONAL RADIOLOGY/VASCULAR | Facility: CLINIC | Age: 50
End: 2020-10-09
Payer: MEDICAID

## 2020-10-09 DIAGNOSIS — I72.8 ANEURYSM OF OTHER SPECIFIED ARTERIES: ICD-10-CM

## 2020-10-09 PROCEDURE — 99203 OFFICE O/P NEW LOW 30 MIN: CPT | Mod: 95

## 2020-10-09 RX ORDER — DENOSUMAB 60 MG/ML
60 INJECTION SUBCUTANEOUS
Refills: 0 | Status: ACTIVE | COMMUNITY

## 2020-10-09 RX ORDER — MULTIVITAMIN
TABLET ORAL DAILY
Refills: 0 | Status: ACTIVE | COMMUNITY
Start: 2020-10-09

## 2020-10-09 RX ORDER — CHROMIUM 200 MCG
TABLET ORAL
Refills: 0 | Status: DISCONTINUED | COMMUNITY
End: 2020-10-09

## 2020-10-09 RX ORDER — ASPIRIN 81 MG
81 TABLET, DELAYED RELEASE (ENTERIC COATED) ORAL DAILY
Refills: 0 | Status: ACTIVE | COMMUNITY

## 2020-10-13 LAB
25(OH)D3 SERPL-MCNC: 40.8 NG/ML
ALBUMIN SERPL ELPH-MCNC: 4.3 G/DL
ALP BLD-CCNC: 55 U/L
ALT SERPL-CCNC: 24 U/L
ANION GAP SERPL CALC-SCNC: 12 MMOL/L
AST SERPL-CCNC: 18 U/L
BILIRUB SERPL-MCNC: 0.5 MG/DL
BUN SERPL-MCNC: 13 MG/DL
CALCIUM SERPL-MCNC: 9.1 MG/DL
CHLORIDE SERPL-SCNC: 106 MMOL/L
CO2 SERPL-SCNC: 25 MMOL/L
CREAT SERPL-MCNC: 0.66 MG/DL
GLUCOSE SERPL-MCNC: 112 MG/DL
POTASSIUM SERPL-SCNC: 4.1 MMOL/L
PROT SERPL-MCNC: 7 G/DL
SODIUM SERPL-SCNC: 143 MMOL/L

## 2020-10-15 ENCOUNTER — APPOINTMENT (OUTPATIENT)
Dept: NUCLEAR MEDICINE | Facility: IMAGING CENTER | Age: 50
End: 2020-10-15
Payer: MEDICAID

## 2020-10-15 ENCOUNTER — OUTPATIENT (OUTPATIENT)
Dept: OUTPATIENT SERVICES | Facility: HOSPITAL | Age: 50
LOS: 1 days | End: 2020-10-15
Payer: MEDICAID

## 2020-10-15 ENCOUNTER — RESULT REVIEW (OUTPATIENT)
Age: 50
End: 2020-10-15

## 2020-10-15 DIAGNOSIS — C50.919 MALIGNANT NEOPLASM OF UNSPECIFIED SITE OF UNSPECIFIED FEMALE BREAST: ICD-10-CM

## 2020-10-15 DIAGNOSIS — Z90.12 ACQUIRED ABSENCE OF LEFT BREAST AND NIPPLE: Chronic | ICD-10-CM

## 2020-10-15 PROCEDURE — 78830 RP LOCLZJ TUM SPECT W/CT 1: CPT | Mod: 26

## 2020-10-15 PROCEDURE — 78306 BONE IMAGING WHOLE BODY: CPT | Mod: 26

## 2020-10-15 PROCEDURE — 78830 RP LOCLZJ TUM SPECT W/CT 1: CPT

## 2020-10-15 PROCEDURE — A9561: CPT

## 2020-10-15 PROCEDURE — 78306 BONE IMAGING WHOLE BODY: CPT

## 2020-10-26 ENCOUNTER — OUTPATIENT (OUTPATIENT)
Dept: OUTPATIENT SERVICES | Facility: HOSPITAL | Age: 50
LOS: 1 days | Discharge: HOME | End: 2020-10-26

## 2020-10-26 DIAGNOSIS — C50.912 MALIGNANT NEOPLASM OF UNSPECIFIED SITE OF LEFT FEMALE BREAST: ICD-10-CM

## 2020-10-26 DIAGNOSIS — Z90.12 ACQUIRED ABSENCE OF LEFT BREAST AND NIPPLE: Chronic | ICD-10-CM

## 2020-10-30 ENCOUNTER — APPOINTMENT (OUTPATIENT)
Dept: INFUSION THERAPY | Facility: HOSPITAL | Age: 50
End: 2020-10-30

## 2020-11-20 ENCOUNTER — OUTPATIENT (OUTPATIENT)
Dept: OUTPATIENT SERVICES | Facility: HOSPITAL | Age: 50
LOS: 1 days | Discharge: HOME | End: 2020-11-20

## 2020-11-20 ENCOUNTER — NON-APPOINTMENT (OUTPATIENT)
Age: 50
End: 2020-11-20

## 2020-11-20 DIAGNOSIS — Z90.12 ACQUIRED ABSENCE OF LEFT BREAST AND NIPPLE: Chronic | ICD-10-CM

## 2020-11-27 ENCOUNTER — APPOINTMENT (OUTPATIENT)
Dept: INFUSION THERAPY | Facility: HOSPITAL | Age: 50
End: 2020-11-27

## 2020-11-30 ENCOUNTER — NON-APPOINTMENT (OUTPATIENT)
Age: 50
End: 2020-11-30

## 2020-12-14 ENCOUNTER — NON-APPOINTMENT (OUTPATIENT)
Age: 50
End: 2020-12-14

## 2020-12-17 DIAGNOSIS — Z01.818 ENCOUNTER FOR OTHER PREPROCEDURAL EXAMINATION: ICD-10-CM

## 2020-12-18 ENCOUNTER — APPOINTMENT (OUTPATIENT)
Dept: INFUSION THERAPY | Facility: HOSPITAL | Age: 50
End: 2020-12-18

## 2020-12-20 ENCOUNTER — OUTPATIENT (OUTPATIENT)
Dept: OUTPATIENT SERVICES | Facility: HOSPITAL | Age: 50
LOS: 1 days | Discharge: HOME | End: 2020-12-20

## 2020-12-20 DIAGNOSIS — C50.912 MALIGNANT NEOPLASM OF UNSPECIFIED SITE OF LEFT FEMALE BREAST: ICD-10-CM

## 2020-12-20 DIAGNOSIS — Z90.12 ACQUIRED ABSENCE OF LEFT BREAST AND NIPPLE: Chronic | ICD-10-CM

## 2020-12-21 ENCOUNTER — APPOINTMENT (OUTPATIENT)
Dept: DISASTER EMERGENCY | Facility: CLINIC | Age: 50
End: 2020-12-21

## 2020-12-23 PROBLEM — Z01.419 ENCOUNTER FOR CERVICAL PAP SMEAR WITH PELVIC EXAM: Status: RESOLVED | Noted: 2020-06-17 | Resolved: 2020-12-23

## 2020-12-23 LAB — SARS-COV-2 N GENE NPH QL NAA+PROBE: DETECTED

## 2020-12-24 ENCOUNTER — APPOINTMENT (OUTPATIENT)
Dept: INFUSION THERAPY | Facility: HOSPITAL | Age: 50
End: 2020-12-24

## 2020-12-31 ENCOUNTER — APPOINTMENT (OUTPATIENT)
Dept: ULTRASOUND IMAGING | Facility: IMAGING CENTER | Age: 50
End: 2020-12-31

## 2020-12-31 ENCOUNTER — APPOINTMENT (OUTPATIENT)
Dept: MAMMOGRAPHY | Facility: IMAGING CENTER | Age: 50
End: 2020-12-31

## 2021-01-15 ENCOUNTER — APPOINTMENT (OUTPATIENT)
Dept: HEMATOLOGY ONCOLOGY | Facility: CLINIC | Age: 51
End: 2021-01-15
Payer: MEDICAID

## 2021-01-15 PROCEDURE — 99213 OFFICE O/P EST LOW 20 MIN: CPT | Mod: 95

## 2021-01-16 ENCOUNTER — OUTPATIENT (OUTPATIENT)
Dept: OUTPATIENT SERVICES | Facility: HOSPITAL | Age: 51
LOS: 1 days | Discharge: HOME | End: 2021-01-16

## 2021-01-16 DIAGNOSIS — Z90.12 ACQUIRED ABSENCE OF LEFT BREAST AND NIPPLE: Chronic | ICD-10-CM

## 2021-01-16 DIAGNOSIS — C50.912 MALIGNANT NEOPLASM OF UNSPECIFIED SITE OF LEFT FEMALE BREAST: ICD-10-CM

## 2021-01-18 NOTE — HISTORY OF PRESENT ILLNESS
[Date: ____________] : Patient's last distress assessment performed on [unfilled]. [6 - Distress Level] : Distress Level: 6 [Patient given social work contact information and resource sheet] : Patient was given social work contact information and resource sheet [IIB] : IIB [de-identified] : This is a very pleasant 48- year-old premenopausal lady who was diagnosed with breast cancer and is here for a consultation today. Her oncologic history is follows:\par \par She underwent routine breast imaging on 5/4/17 which showed a spiculated lesion measuring 2.2 x 2.9 cm in the upper outer left breast. She underwent left breast 2:00 lesion core biopsy on 6/9/17 which showed invasive well-differentiated ductal carcinoma, Ej score 5/9, measuring 1.5 cm, ER 95%, NE 10% HER-2/shaila negative. She underwent a breast MRI on 7/15/17 which showed additional concerning areas for which biopsy was recommended. She underwent left breast 3:00 MRI biopsy on 7/18/17 which showed invasive well-differentiated ductal carcinoma, measuring 0.6 cm, Ej score 5/9 ER more than 90%, NE more than 90% HER-2/shaila negative. \par \par She underwent left mastectomy and left sentinel axillary lymph node dissection on 9/28/17 which showed invasive moderately differentiated ductal carcinoma, [place] grade 2/3, measuring 4 cm, 0.3 cm from the margin. Lymphovascular invasion was noted. Low/intermediate grade DCIS was noted. One sentinel lymph node was positive for metastasis measuring 0.3 cm. 10 additional lymph nodes were negative.\par \par s/p TC x4 completed in January.2018\par superficial phlebitis in the right arm after cycle 4 chemo. No s/f thrombus felt but noted to have focal superficial thrombosis on sono.therefore started on OS+AI instead of tamoxifen [de-identified] : \par GENETICS: CANCERNEXT AMBRY PANEL NEGATIVE \par  [FreeTextEntry1] : lupron started 2/2018\par exemestane 3/2018 [de-identified] : Ms. OSKAR SHARMA  is here for a follow up for left breast cancer on Lupron since 2/2018, and Exemestane since 3/2018.  \par Persistently positive for COVID since 12/3. She has no fever, cough, SOB. Ok to restart lupron. She wishes to switch to Q3M lupron\par Takes exemestane daily, good compliance. Bone pain resolved. Bone scan 10/2020 YAHAIRA. She is on gabapentin for insomnia, hot flashes and pain. She was prescribed effexor for hot flashes but felt very tired and sleepy therefore arent using it. Hot flashes are still present but tolerable. She is active, no change in energy, wt or appetite.  No vag dryness, no excessive fatigue. Hair loss stable with biotin. \par LMP 11/15/17. not sexually active. OnLupron monthly. no vag spotting. FSh 4-5 range. Doesn't want BSO. She wishes to switch to Q3M lupron\par mammogram with Dr Lees 6/2020. She has left mastectomy and right reduction. \par Dexa 6/2020 OSTEOPOROSIS, started prolia in july 2020. Next dose 1/2021 Takes calcium and vit D \par ct a/p 8/2020: Stable renal artery aneurysm

## 2021-01-18 NOTE — REASON FOR VISIT
[Follow-Up Visit] : a follow-up [Other: _____] : [unfilled] [Home] : at home, [unfilled] , at the time of the visit. [Medical Office: ([facility])___] : at the medical office located in  [Verbal consent obtained from patient] : the patient, [unfilled] [FreeTextEntry2] : breast cancer ER/NM +  HER-2 -

## 2021-01-18 NOTE — PHYSICAL EXAM
[Fully active, able to carry on all pre-disease performance without restriction] : Status 0 - Fully active, able to carry on all pre-disease performance without restriction [Normal] : affect appropriate [de-identified] : Constitutional: well developed, well nourished, in no acute distress. \par Eyes: no icterus seen. no conjunctival injection\par ENT: no tongue swelling, no nasal discharge\par Neck: no visible masses or goitre \par Pulmonary: no audible wheeze,  respirations unlabored\par Musculoskeletal: full range of motion, walking in the house\par Skin: no rash visible on face or upper chest [de-identified] : s/p left mastectomy with reconstruction. no CW or axillary nodule. s/p rt breast reduction mammoplasty for symmetry. Mild periareolar erythema 2/2 surgery- chronic

## 2021-01-18 NOTE — ASSESSMENT
[Curative] : Goals of care discussed with patient: Curative [FreeTextEntry1] : This is a 48-year-old very pleasant premenopausal lady, who is diagnosed with stage IIB (T2, N1, M0) ER/VA positive and HER-2/shaila negative left breast well to moderately differentiated invasive ductal carcinoma. She is status post left mastectomy and axillary lymph node dissection on 10/20/17. Intermediate oncotype. Completed TC x 4. BRCA NEGATIVE. Consented for PALLAS and on arm B C21\par \par - Breast ca:  She is tolerating exemestane very well without significant side effects.  Reports good compliance. Plan to continue AI for total of 5-10 years. On lupron monthly. She wishes to switch to Q3M lupron. she will report vag bleeding spotting. Estradiol suppressed and FSH is low 5/2019. Doesn't want BSO. She is up to date with breast imaging. Follows with Dr Lees.\par - Aromatase inhibitor induced arthralgia: Mild and tolerable. Rec stretching exercises, physical therapy and ortho f/u. Continuet Gabapentin PRN\par - Hot flashes: 2/2 exemestane: Advised to wear layers and use fan prn. She could not tolerate effexor, but takes gabapentin prn.\par - Hair thinning- Likely 2/2 AI. Stable with Biotin\par - Osteoporosis: concern for worsening bone density due to exemestane. Rec to  continue calcium and vit D. DEXA 1-2 yrs. Started prolia 7/2020, next 1/2021. dental PRN\par - High cholesterol: concern for worsening cholesterol due to AI. Pt is on zocor. Lipid profile annually.\par - Low Vitamin D: concern for worsening bone loss due to exemestane and low vit D. Discussed to increase Vit D intake. Check level today\par - Post mastectomy pain- resolved\par - BW as per PALLAS protocol \par 	\par RTC next week for prolia and lupron\par MD Q 3m per protocol\par lupron q3m

## 2021-01-18 NOTE — CONSULT LETTER
[Dear  ___] : Dear  [unfilled], [Consult Letter:] : I had the pleasure of evaluating your patient, [unfilled]. [Please see my note below.] : Please see my note below. [Consult Closing:] : Thank you very much for allowing me to participate in the care of this patient.  If you have any questions, please do not hesitate to contact me. [Sincerely,] : Sincerely, [DrTorin  ___] : Dr. HOWARD [FreeTextEntry3] : Mercedes Bennett M.D.\par  of Medicine\par [facility]\par [facility]\par [address] [place] [address]\par [address]\par Tele # [phone]; Fax [phone]

## 2021-01-22 ENCOUNTER — RESULT REVIEW (OUTPATIENT)
Age: 51
End: 2021-01-22

## 2021-01-22 ENCOUNTER — APPOINTMENT (OUTPATIENT)
Dept: HEMATOLOGY ONCOLOGY | Facility: CLINIC | Age: 51
End: 2021-01-22

## 2021-01-22 ENCOUNTER — APPOINTMENT (OUTPATIENT)
Dept: INFUSION THERAPY | Facility: HOSPITAL | Age: 51
End: 2021-01-22

## 2021-01-22 LAB
BASOPHILS # BLD AUTO: 0.05 K/UL — SIGNIFICANT CHANGE UP (ref 0–0.2)
BASOPHILS NFR BLD AUTO: 0.6 % — SIGNIFICANT CHANGE UP (ref 0–2)
EOSINOPHIL # BLD AUTO: 0.19 K/UL — SIGNIFICANT CHANGE UP (ref 0–0.5)
EOSINOPHIL NFR BLD AUTO: 2.2 % — SIGNIFICANT CHANGE UP (ref 0–6)
HCT VFR BLD CALC: 38 % — SIGNIFICANT CHANGE UP (ref 34.5–45)
HGB BLD-MCNC: 12.8 G/DL — SIGNIFICANT CHANGE UP (ref 11.5–15.5)
IMM GRANULOCYTES NFR BLD AUTO: 0.4 % — SIGNIFICANT CHANGE UP (ref 0–1.5)
LYMPHOCYTES # BLD AUTO: 2.79 K/UL — SIGNIFICANT CHANGE UP (ref 1–3.3)
LYMPHOCYTES # BLD AUTO: 32.9 % — SIGNIFICANT CHANGE UP (ref 13–44)
MCHC RBC-ENTMCNC: 29.9 PG — SIGNIFICANT CHANGE UP (ref 27–34)
MCHC RBC-ENTMCNC: 33.7 G/DL — SIGNIFICANT CHANGE UP (ref 32–36)
MCV RBC AUTO: 88.8 FL — SIGNIFICANT CHANGE UP (ref 80–100)
MONOCYTES # BLD AUTO: 0.44 K/UL — SIGNIFICANT CHANGE UP (ref 0–0.9)
MONOCYTES NFR BLD AUTO: 5.2 % — SIGNIFICANT CHANGE UP (ref 2–14)
NEUTROPHILS # BLD AUTO: 4.99 K/UL — SIGNIFICANT CHANGE UP (ref 1.8–7.4)
NEUTROPHILS NFR BLD AUTO: 58.7 % — SIGNIFICANT CHANGE UP (ref 43–77)
NRBC # BLD: 0 /100 WBCS — SIGNIFICANT CHANGE UP (ref 0–0)
PLATELET # BLD AUTO: 244 K/UL — SIGNIFICANT CHANGE UP (ref 150–400)
RBC # BLD: 4.28 M/UL — SIGNIFICANT CHANGE UP (ref 3.8–5.2)
RBC # FLD: 13.2 % — SIGNIFICANT CHANGE UP (ref 10.3–14.5)
WBC # BLD: 8.49 K/UL — SIGNIFICANT CHANGE UP (ref 3.8–10.5)
WBC # FLD AUTO: 8.49 K/UL — SIGNIFICANT CHANGE UP (ref 3.8–10.5)

## 2021-01-23 DIAGNOSIS — C79.51 SECONDARY MALIGNANT NEOPLASM OF BONE: ICD-10-CM

## 2021-01-29 ENCOUNTER — APPOINTMENT (OUTPATIENT)
Dept: NEPHROLOGY | Facility: CLINIC | Age: 51
End: 2021-01-29
Payer: MEDICAID

## 2021-01-29 VITALS — HEART RATE: 70 BPM | SYSTOLIC BLOOD PRESSURE: 148 MMHG | DIASTOLIC BLOOD PRESSURE: 80 MMHG

## 2021-01-29 VITALS
TEMPERATURE: 97.6 F | HEIGHT: 62 IN | DIASTOLIC BLOOD PRESSURE: 97 MMHG | HEART RATE: 73 BPM | BODY MASS INDEX: 28.28 KG/M2 | SYSTOLIC BLOOD PRESSURE: 153 MMHG | OXYGEN SATURATION: 98 % | WEIGHT: 153.66 LBS

## 2021-01-29 PROCEDURE — 99214 OFFICE O/P EST MOD 30 MIN: CPT

## 2021-01-29 PROCEDURE — 99072 ADDL SUPL MATRL&STAF TM PHE: CPT

## 2021-01-29 NOTE — ASSESSMENT
[FreeTextEntry1] : 49 yo female with breast cancer, HTN, borderline DM, hyperlipidemia with left renal artery aneurysm\par Breast cancer following with Cedric closely\par HTN: Has reactive component.  Blood pressure at home is very well controlled\par Continue losartan dose 100mg and amlodipine 2.5 mg daily at bedtime\par Low Na diet. \par Renal artery aneurysm : pt to followup for angio with Dr Montemayor

## 2021-01-29 NOTE — HISTORY OF PRESENT ILLNESS
[FreeTextEntry1] : 49 yo female with HTN,  left renal artery aneurysm, breast cancer\par She is on exemestane with maryan\par Saw Dr Florian and Dr Montemayor for her renal artery aneurysm. 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 Had loss of smell and numbness of right side of head. Had gone to [facility] 1 week later for CP that resolved. Now feeling better\par \par Her blood pressures at home have been controlled. 110-120/70

## 2021-02-04 LAB
25(OH)D3 SERPL-MCNC: 33.6 NG/ML
ALBUMIN SERPL ELPH-MCNC: 4.5 G/DL
ALP BLD-CCNC: 58 U/L
ALT SERPL-CCNC: 28 U/L
ANION GAP SERPL CALC-SCNC: 15 MMOL/L
AST SERPL-CCNC: 21 U/L
BILIRUB SERPL-MCNC: 0.5 MG/DL
BUN SERPL-MCNC: 10 MG/DL
CALCIUM SERPL-MCNC: 9.6 MG/DL
CHLORIDE SERPL-SCNC: 104 MMOL/L
CO2 SERPL-SCNC: 22 MMOL/L
CREAT SERPL-MCNC: 0.72 MG/DL
GLUCOSE SERPL-MCNC: 94 MG/DL
POTASSIUM SERPL-SCNC: 4.1 MMOL/L
PROT SERPL-MCNC: 7.5 G/DL
SODIUM SERPL-SCNC: 142 MMOL/L

## 2021-03-09 ENCOUNTER — APPOINTMENT (OUTPATIENT)
Dept: VASCULAR SURGERY | Facility: CLINIC | Age: 51
End: 2021-03-09
Payer: MEDICAID

## 2021-03-09 ENCOUNTER — NON-APPOINTMENT (OUTPATIENT)
Age: 51
End: 2021-03-09

## 2021-03-09 VITALS
HEART RATE: 74 BPM | HEIGHT: 62 IN | BODY MASS INDEX: 28.16 KG/M2 | SYSTOLIC BLOOD PRESSURE: 151 MMHG | WEIGHT: 153 LBS | TEMPERATURE: 97.9 F | DIASTOLIC BLOOD PRESSURE: 94 MMHG

## 2021-03-09 PROCEDURE — 99072 ADDL SUPL MATRL&STAF TM PHE: CPT

## 2021-03-09 PROCEDURE — 93976 VASCULAR STUDY: CPT

## 2021-03-09 PROCEDURE — 99213 OFFICE O/P EST LOW 20 MIN: CPT

## 2021-03-09 NOTE — PHYSICAL EXAM
[Abdominal Aorta] : Normal abdominal aorta [2+] : left 2+ [No Rash or Lesion] : No rash or lesion [Alert] : alert [Calm] : calm [JVD] : no jugular venous distention  [Ankle Swelling (On Exam)] : not present [Varicose Veins Of Lower Extremities] : not present [] : not present [Skin Ulcer] : no ulcer [de-identified] : appears well

## 2021-03-09 NOTE — HISTORY OF PRESENT ILLNESS
[FreeTextEntry1] : 49 yo female with history of breast ca s/p excision chemo and reconstruction, htn now controlled presents for follow up of left sided renal artery aneurysm\par pt denies any history of left sided flank pain, francisco hematuria or worsening of htn. \par pt reports last pcp appointment she was informed of microscopic hematuria based on routine lab work

## 2021-03-09 NOTE — ASSESSMENT
[FreeTextEntry1] : 51 yo female with history of breast ca s/p excision chemo and reconstruction, htn now controlled presents for follow up of left sided renal artery aneurysm\par \par duplex shows no change in the size of Aneurysm\par \par to f/u with IR\par

## 2021-03-16 ENCOUNTER — RESULT REVIEW (OUTPATIENT)
Age: 51
End: 2021-03-16

## 2021-03-16 ENCOUNTER — OUTPATIENT (OUTPATIENT)
Dept: OUTPATIENT SERVICES | Facility: HOSPITAL | Age: 51
LOS: 1 days | End: 2021-03-16
Payer: MEDICAID

## 2021-03-16 ENCOUNTER — APPOINTMENT (OUTPATIENT)
Dept: ULTRASOUND IMAGING | Facility: IMAGING CENTER | Age: 51
End: 2021-03-16
Payer: MEDICAID

## 2021-03-16 ENCOUNTER — APPOINTMENT (OUTPATIENT)
Dept: MAMMOGRAPHY | Facility: IMAGING CENTER | Age: 51
End: 2021-03-16
Payer: MEDICAID

## 2021-03-16 DIAGNOSIS — Z90.12 ACQUIRED ABSENCE OF LEFT BREAST AND NIPPLE: Chronic | ICD-10-CM

## 2021-03-16 DIAGNOSIS — Z00.8 ENCOUNTER FOR OTHER GENERAL EXAMINATION: ICD-10-CM

## 2021-03-16 PROCEDURE — G0279: CPT

## 2021-03-16 PROCEDURE — G0279: CPT | Mod: 26

## 2021-03-16 PROCEDURE — 76641 ULTRASOUND BREAST COMPLETE: CPT | Mod: 26,RT

## 2021-03-16 PROCEDURE — 77065 DX MAMMO INCL CAD UNI: CPT | Mod: 26,RT

## 2021-03-16 PROCEDURE — 77065 DX MAMMO INCL CAD UNI: CPT

## 2021-03-16 PROCEDURE — 76641 ULTRASOUND BREAST COMPLETE: CPT

## 2021-03-22 ENCOUNTER — NON-APPOINTMENT (OUTPATIENT)
Age: 51
End: 2021-03-22

## 2021-04-12 ENCOUNTER — OUTPATIENT (OUTPATIENT)
Dept: OUTPATIENT SERVICES | Facility: HOSPITAL | Age: 51
LOS: 1 days | Discharge: HOME | End: 2021-04-12

## 2021-04-12 DIAGNOSIS — C50.912 MALIGNANT NEOPLASM OF UNSPECIFIED SITE OF LEFT FEMALE BREAST: ICD-10-CM

## 2021-04-12 DIAGNOSIS — Z90.12 ACQUIRED ABSENCE OF LEFT BREAST AND NIPPLE: Chronic | ICD-10-CM

## 2021-04-16 ENCOUNTER — RESULT REVIEW (OUTPATIENT)
Age: 51
End: 2021-04-16

## 2021-04-16 ENCOUNTER — APPOINTMENT (OUTPATIENT)
Dept: INFUSION THERAPY | Facility: HOSPITAL | Age: 51
End: 2021-04-16

## 2021-04-16 ENCOUNTER — APPOINTMENT (OUTPATIENT)
Dept: HEMATOLOGY ONCOLOGY | Facility: CLINIC | Age: 51
End: 2021-04-16
Payer: MEDICAID

## 2021-04-16 VITALS
SYSTOLIC BLOOD PRESSURE: 143 MMHG | TEMPERATURE: 97.1 F | HEART RATE: 68 BPM | BODY MASS INDEX: 27.34 KG/M2 | HEIGHT: 62.8 IN | WEIGHT: 154.32 LBS | DIASTOLIC BLOOD PRESSURE: 86 MMHG | OXYGEN SATURATION: 98 % | RESPIRATION RATE: 16 BRPM

## 2021-04-16 LAB
BASOPHILS # BLD AUTO: 0.06 K/UL — SIGNIFICANT CHANGE UP (ref 0–0.2)
BASOPHILS NFR BLD AUTO: 0.9 % — SIGNIFICANT CHANGE UP (ref 0–2)
EOSINOPHIL # BLD AUTO: 0.17 K/UL — SIGNIFICANT CHANGE UP (ref 0–0.5)
EOSINOPHIL NFR BLD AUTO: 2.5 % — SIGNIFICANT CHANGE UP (ref 0–6)
HCT VFR BLD CALC: 41.7 % — SIGNIFICANT CHANGE UP (ref 34.5–45)
HGB BLD-MCNC: 13.7 G/DL — SIGNIFICANT CHANGE UP (ref 11.5–15.5)
IMM GRANULOCYTES NFR BLD AUTO: 0.3 % — SIGNIFICANT CHANGE UP (ref 0–1.5)
LYMPHOCYTES # BLD AUTO: 2.09 K/UL — SIGNIFICANT CHANGE UP (ref 1–3.3)
LYMPHOCYTES # BLD AUTO: 30.3 % — SIGNIFICANT CHANGE UP (ref 13–44)
MCHC RBC-ENTMCNC: 29.3 PG — SIGNIFICANT CHANGE UP (ref 27–34)
MCHC RBC-ENTMCNC: 32.9 G/DL — SIGNIFICANT CHANGE UP (ref 32–36)
MCV RBC AUTO: 89.3 FL — SIGNIFICANT CHANGE UP (ref 80–100)
MONOCYTES # BLD AUTO: 0.42 K/UL — SIGNIFICANT CHANGE UP (ref 0–0.9)
MONOCYTES NFR BLD AUTO: 6.1 % — SIGNIFICANT CHANGE UP (ref 2–14)
NEUTROPHILS # BLD AUTO: 4.13 K/UL — SIGNIFICANT CHANGE UP (ref 1.8–7.4)
NEUTROPHILS NFR BLD AUTO: 59.9 % — SIGNIFICANT CHANGE UP (ref 43–77)
NRBC # BLD: 0 /100 WBCS — SIGNIFICANT CHANGE UP (ref 0–0)
PLATELET # BLD AUTO: 226 K/UL — SIGNIFICANT CHANGE UP (ref 150–400)
RBC # BLD: 4.67 M/UL — SIGNIFICANT CHANGE UP (ref 3.8–5.2)
RBC # FLD: 12.6 % — SIGNIFICANT CHANGE UP (ref 10.3–14.5)
WBC # BLD: 6.89 K/UL — SIGNIFICANT CHANGE UP (ref 3.8–10.5)
WBC # FLD AUTO: 6.89 K/UL — SIGNIFICANT CHANGE UP (ref 3.8–10.5)

## 2021-04-16 PROCEDURE — 99214 OFFICE O/P EST MOD 30 MIN: CPT

## 2021-04-16 PROCEDURE — 99072 ADDL SUPL MATRL&STAF TM PHE: CPT

## 2021-04-19 NOTE — PHYSICAL EXAM
[Restricted in physically strenuous activity but ambulatory and able to carry out work of a light or sedentary nature] : Status 1- Restricted in physically strenuous activity but ambulatory and able to carry out work of a light or sedentary nature, e.g., light house work, office work [Normal] : affect appropriate [de-identified] : s/p left mastectomy with reconstruction No CW or axillary nodules noted, s/p right breast reduction mammoplasty for symmetry [de-identified] : She is quiet today

## 2021-04-19 NOTE — ASSESSMENT
[Curative] : Goals of care discussed with patient: Curative [FreeTextEntry1] : This is a 48-year-old very pleasant premenopausal lady, who is diagnosed with stage IIB (T2, N1, M0) ER/AK positive and HER-2/shaila negative left breast well to moderately differentiated invasive ductal carcinoma. She is status post left mastectomy and axillary lymph node dissection on 10/20/17. Intermediate oncotype. Completed TC x 4. BRCA NEGATIVE. Consented for PALLAS and on arm B C21\par \par - Breast ca:  She is tolerating exemestane very well without significant side effects.  Reports good compliance. Plan to continue AI for total of 5-10 years. On lupron monthly. She wishes to switch to Q3M lupron. she will report vag bleeding spotting. Estradiol suppressed and FSH is low 5/2019. Doesn't want BSO. She is up to date with breast imaging 3/2021 BIRADS 2  Follows with Dr Nabeel Bryant Pfizer Covid vaccine 4/13/21, 2nd dose scheduled for 5/4/21 \par  \par - Aromatase inhibitor induced arthralgia: Tolerable but has worsened post COVID infection. She does not want AI break at this time. She has not been able to return to work. Rec stretching exercises, physical therapy and ortho f/u. Continue Gabapentin PRN\par - Hot flashes: 2/2 exemestane: Advised to wear layers and use fan prn. She could not tolerate effexor, but takes gabapentin prn.\par - Hair thinning- Likely 2/2 AI. Stable with Biotin\par - Osteoporosis: concern for worsening bone density due to exemestane. Rec to  continue calcium and vit D. DEXA 1-2 yrs. Started prolia 7/2020, next 7/2021. dental PRN\par - High cholesterol: concern for worsening cholesterol due to AI. Pt is on zocor. Lipid profile annually.\par - Low Vitamin D: concern for worsening bone loss due to exemestane and low vit D. Discussed to increase Vit D intake. Check level today\par - Post mastectomy pain- resolved\par - BW as per PALLAS protocol \par 	\par -lupron q3m\par RTC 3 months for prolia and lupron\par D/w and seen in collaboration with Dr. Mercedes Bennett\par

## 2021-04-19 NOTE — REASON FOR VISIT
[Follow-Up Visit] : a follow-up [Other: _____] : [unfilled] [FreeTextEntry2] : breast cancer ER/OR +  HER-2 -

## 2021-04-19 NOTE — HISTORY OF PRESENT ILLNESS
[Date: ____________] : Patient's last distress assessment performed on [unfilled]. [6 - Distress Level] : Distress Level: 6 [Patient given social work contact information and resource sheet] : Patient was given social work contact information and resource sheet [IIB] : IIB [de-identified] : This is a very pleasant 48- year-old premenopausal lady who was diagnosed with breast cancer and is here for a consultation today. Her oncologic history is follows:\par \par She underwent routine breast imaging on 5/4/17 which showed a spiculated lesion measuring 2.2 x 2.9 cm in the upper outer left breast. She underwent left breast 2:00 lesion core biopsy on 6/9/17 which showed invasive well-differentiated ductal carcinoma, Ej score 5/9, measuring 1.5 cm, ER 95%, NM 10% HER-2/shaila negative. She underwent a breast MRI on 7/15/17 which showed additional concerning areas for which biopsy was recommended. She underwent left breast 3:00 MRI biopsy on 7/18/17 which showed invasive well-differentiated ductal carcinoma, measuring 0.6 cm, Ej score 5/9 ER more than 90%, NM more than 90% HER-2/shaila negative. \par \par She underwent left mastectomy and left sentinel axillary lymph node dissection on 9/28/17 which showed invasive moderately differentiated ductal carcinoma, [place] grade 2/3, measuring 4 cm, 0.3 cm from the margin. Lymphovascular invasion was noted. Low/intermediate grade DCIS was noted. One sentinel lymph node was positive for metastasis measuring 0.3 cm. 10 additional lymph nodes were negative.\par \par s/p TC x4 completed in January.2018\par superficial phlebitis in the right arm after cycle 4 chemo. No s/f thrombus felt but noted to have focal superficial thrombosis on sono.therefore started on OS+AI instead of tamoxifen [de-identified] : \par GENETICS: CANCERNEXT AMBRY PANEL NEGATIVE \par  [FreeTextEntry1] : lupron started 2/2018\par exemestane 3/2018 [de-identified] : Ms. OSKAR SHARMA  is here for a follow up for left breast cancer on Lupron since 2/2018, and Exemestane since 3/2018.  \par Persistently positive for COVID since 12/3. She has no fever, cough, SOB. Ok to restart lupron. She wishes to switch to Q3M lupron\par Takes exemestane daily, good compliance. Bone pain resolved. Bone scan 10/2020 YAHAIRA. She is on gabapentin for insomnia, hot flashes and pain. She was prescribed effexor for hot flashes but felt very tired and sleepy therefore was not using it. Hot flashes are still present but tolerable. She is active, no change in energy, wt or appetite.  No vag dryness, no excessive fatigue. Hair loss stable with biotin. \par LMP 11/15/17. not sexually active. On Lupron monthly. no vag spotting. FSH 4-5 range. Doesn't want BSO. She wishes to switch to Q3M lupron\par mammogram with Dr Lees 6/2020. She had left mastectomy and right reduction\par Mammo/sono right breast 3/16/21 BIRADS-2\par Dexa 6/2020 OSTEOPOROSIS, started prolia in july 2020. Next dose 7/2021 Takes calcium and vit D \par ct a/p 8/2020: Stable renal artery aneurysm \par Today 4/16/21 she is here Lupron injection next prolia 7/2021 has some bone/joint pain but tolerable. She had COVID and feels body aches is a residual effect of covid infection. She has not been able to return to work does not want AI break at this time stretch exercises rec\par First dose Pfizer Covid vaccine on 4/13/21 Tolerated well 2nd dose scheduled for 5/4/21

## 2021-04-20 LAB
ALBUMIN SERPL ELPH-MCNC: 4.7 G/DL
ALP BLD-CCNC: 57 U/L
ALT SERPL-CCNC: 24 U/L
ANION GAP SERPL CALC-SCNC: 12 MMOL/L
AST SERPL-CCNC: 20 U/L
BILIRUB SERPL-MCNC: 0.7 MG/DL
BUN SERPL-MCNC: 9 MG/DL
CALCIUM SERPL-MCNC: 9.2 MG/DL
CHLORIDE SERPL-SCNC: 105 MMOL/L
CO2 SERPL-SCNC: 25 MMOL/L
CREAT SERPL-MCNC: 0.62 MG/DL
GLUCOSE SERPL-MCNC: 110 MG/DL
POTASSIUM SERPL-SCNC: 3.9 MMOL/L
PROT SERPL-MCNC: 7.6 G/DL
SODIUM SERPL-SCNC: 142 MMOL/L

## 2021-07-14 ENCOUNTER — OUTPATIENT (OUTPATIENT)
Dept: OUTPATIENT SERVICES | Facility: HOSPITAL | Age: 51
LOS: 1 days | Discharge: HOME | End: 2021-07-14

## 2021-07-14 DIAGNOSIS — C50.912 MALIGNANT NEOPLASM OF UNSPECIFIED SITE OF LEFT FEMALE BREAST: ICD-10-CM

## 2021-07-14 DIAGNOSIS — Z90.12 ACQUIRED ABSENCE OF LEFT BREAST AND NIPPLE: Chronic | ICD-10-CM

## 2021-07-16 ENCOUNTER — APPOINTMENT (OUTPATIENT)
Dept: HEMATOLOGY ONCOLOGY | Facility: CLINIC | Age: 51
End: 2021-07-16
Payer: MEDICAID

## 2021-07-16 ENCOUNTER — RESULT REVIEW (OUTPATIENT)
Age: 51
End: 2021-07-16

## 2021-07-16 ENCOUNTER — APPOINTMENT (OUTPATIENT)
Dept: INFUSION THERAPY | Facility: HOSPITAL | Age: 51
End: 2021-07-16

## 2021-07-16 VITALS
RESPIRATION RATE: 16 BRPM | HEART RATE: 87 BPM | TEMPERATURE: 97.8 F | DIASTOLIC BLOOD PRESSURE: 82 MMHG | OXYGEN SATURATION: 97 % | BODY MASS INDEX: 26.84 KG/M2 | WEIGHT: 151.46 LBS | HEIGHT: 62.8 IN | SYSTOLIC BLOOD PRESSURE: 136 MMHG

## 2021-07-16 LAB
BASOPHILS # BLD AUTO: 0.05 K/UL — SIGNIFICANT CHANGE UP (ref 0–0.2)
BASOPHILS NFR BLD AUTO: 0.5 % — SIGNIFICANT CHANGE UP (ref 0–2)
EOSINOPHIL # BLD AUTO: 0.15 K/UL — SIGNIFICANT CHANGE UP (ref 0–0.5)
EOSINOPHIL NFR BLD AUTO: 1.5 % — SIGNIFICANT CHANGE UP (ref 0–6)
HCT VFR BLD CALC: 40.3 % — SIGNIFICANT CHANGE UP (ref 34.5–45)
HGB BLD-MCNC: 13.3 G/DL — SIGNIFICANT CHANGE UP (ref 11.5–15.5)
IMM GRANULOCYTES NFR BLD AUTO: 0.3 % — SIGNIFICANT CHANGE UP (ref 0–1.5)
LYMPHOCYTES # BLD AUTO: 2.98 K/UL — SIGNIFICANT CHANGE UP (ref 1–3.3)
LYMPHOCYTES # BLD AUTO: 29.7 % — SIGNIFICANT CHANGE UP (ref 13–44)
MCHC RBC-ENTMCNC: 29.3 PG — SIGNIFICANT CHANGE UP (ref 27–34)
MCHC RBC-ENTMCNC: 33 G/DL — SIGNIFICANT CHANGE UP (ref 32–36)
MCV RBC AUTO: 88.8 FL — SIGNIFICANT CHANGE UP (ref 80–100)
MONOCYTES # BLD AUTO: 0.46 K/UL — SIGNIFICANT CHANGE UP (ref 0–0.9)
MONOCYTES NFR BLD AUTO: 4.6 % — SIGNIFICANT CHANGE UP (ref 2–14)
NEUTROPHILS # BLD AUTO: 6.36 K/UL — SIGNIFICANT CHANGE UP (ref 1.8–7.4)
NEUTROPHILS NFR BLD AUTO: 63.4 % — SIGNIFICANT CHANGE UP (ref 43–77)
NRBC # BLD: 0 /100 WBCS — SIGNIFICANT CHANGE UP (ref 0–0)
PLATELET # BLD AUTO: 254 K/UL — SIGNIFICANT CHANGE UP (ref 150–400)
RBC # BLD: 4.54 M/UL — SIGNIFICANT CHANGE UP (ref 3.8–5.2)
RBC # FLD: 12.6 % — SIGNIFICANT CHANGE UP (ref 10.3–14.5)
WBC # BLD: 10.03 K/UL — SIGNIFICANT CHANGE UP (ref 3.8–10.5)
WBC # FLD AUTO: 10.03 K/UL — SIGNIFICANT CHANGE UP (ref 3.8–10.5)

## 2021-07-16 PROCEDURE — 99072 ADDL SUPL MATRL&STAF TM PHE: CPT

## 2021-07-16 PROCEDURE — 99215 OFFICE O/P EST HI 40 MIN: CPT

## 2021-07-16 NOTE — PHYSICAL EXAM
[Restricted in physically strenuous activity but ambulatory and able to carry out work of a light or sedentary nature] : Status 1- Restricted in physically strenuous activity but ambulatory and able to carry out work of a light or sedentary nature, e.g., light house work, office work [Normal] : affect appropriate [de-identified] : s/p left mastectomy with reconstruction, noted to have a palpable findings at 2:00 left reconstruction, rec to f/u with US, Likely fat necrosis. s/p right breast reduction mammoplasty for symmetry

## 2021-07-16 NOTE — HISTORY OF PRESENT ILLNESS
[Date: ____________] : Patient's last distress assessment performed on [unfilled]. [6 - Distress Level] : Distress Level: 6 [Patient given social work contact information and resource sheet] : Patient was given social work contact information and resource sheet [IIB] : IIB [de-identified] : This is a very pleasant 48- year-old premenopausal lady who was diagnosed with breast cancer and is here for a consultation today. Her oncologic history is follows:\par \par She underwent routine breast imaging on 5/4/17 which showed a spiculated lesion measuring 2.2 x 2.9 cm in the upper outer left breast. She underwent left breast 2:00 lesion core biopsy on 6/9/17 which showed invasive well-differentiated ductal carcinoma, Ej score 5/9, measuring 1.5 cm, ER 95%, OK 10% HER-2/shaila negative. She underwent a breast MRI on 7/15/17 which showed additional concerning areas for which biopsy was recommended. She underwent left breast 3:00 MRI biopsy on 7/18/17 which showed invasive well-differentiated ductal carcinoma, measuring 0.6 cm, Ej score 5/9 ER more than 90%, OK more than 90% HER-2/shaila negative. \par \par She underwent left mastectomy and left sentinel axillary lymph node dissection on 9/28/17 which showed invasive moderately differentiated ductal carcinoma, [place] grade 2/3, measuring 4 cm, 0.3 cm from the margin. Lymphovascular invasion was noted. Low/intermediate grade DCIS was noted. One sentinel lymph node was positive for metastasis measuring 0.3 cm. 10 additional lymph nodes were negative.\par \par s/p TC x4 completed in January.2018\par superficial phlebitis in the right arm after cycle 4 chemo. No s/f thrombus felt but noted to have focal superficial thrombosis on sono.therefore started on OS+AI instead of tamoxifen [de-identified] : \par GENETICS: CANCERNEXT AMBRY PANEL NEGATIVE \par  [FreeTextEntry1] : lupron started 2/2018\par exemestane 3/2018 [de-identified] : Ms. OSKAR SHARMA  is here for a follow up for left breast cancer on Lupron since 2/2018, and Exemestane since 3/2018, on Q3M lupron\par Takes exemestane daily, good compliance. Bone pain resolved. Bone scan 10/2020 [facility]A. She is on gabapentin for insomnia, hot flashes and pain. She was prescribed effexor for hot flashes but felt very tired and sleepy therefore was not using it. Hot flashes are still present but tolerable. She is active, no change in energy, wt or appetite.  No vag dryness, no excessive fatigue. Hair loss stable with biotin. \par LMP 11/15/17. not sexually active. On Lupron q3m, no vag spotting. FSH 4-5 range. Doesn't want BSO. Check FSH/E today. if FSH> 25, would stop lupron\par Mammo/sono right breast 3/16/21 BIRADS-2\par Dexa 6/2020 OSTEOPOROSIS, started prolia in july 2020. Next dose 7/2021 Takes calcium and vit D \par ct a/p 8/2020: Stable renal artery aneurysm \par s/p covid vaccine\par noted to have a palpable findings at 2:00 left reconstruction, rec to f/u with US, Likely fat necrosis

## 2021-07-16 NOTE — ASSESSMENT
[Curative] : Goals of care discussed with patient: Curative [FreeTextEntry1] : This is a 48-year-old very pleasant premenopausal lady, who is diagnosed with stage IIB (T2, N1, M0) ER/IN positive and HER-2/shaila negative left breast well to moderately differentiated invasive ductal carcinoma. She is status post left mastectomy and axillary lymph node dissection on 10/20/17. Intermediate oncotype. Completed TC x 4. BRCA NEGATIVE. Consented for PALLAS and on arm B\par \par - Breast ca:  She is tolerating exemestane very well without significant side effects.  Reports good compliance. Plan to continue AI for total of 5-10 years. On lupron monthly. She switched to Q3M lupron for conveienece. she will report vag bleeding spotting. Estradiol suppressed and FSH is low 5/2019. Doesn't want BSO. Check FSH/E today. if FSH> 25, would stop lupron.\par She is up to date with breast imaging 3/2021 BIRADS 2  Follows with Dr Lees . She is noted to have a palpable findings at 2:00 left reconstruction, rec to f/u with US, Likely fat necrosis\par  \par - Aromatase inhibitor induced arthralgia: Tolerable but has worsened post COVID infection. She does not want AI break at this time. She has not been able to return to work. Rec stretching exercises, physical therapy and ortho f/u. Continue Gabapentin PRN\par - Hot flashes: 2/2 exemestane: Advised to wear layers and use fan prn. She could not tolerate effexor, but takes gabapentin prn.\par - Hair thinning- Likely 2/2 AI. Stable with Biotin\par - Osteoporosis: concern for worsening bone density due to exemestane. Rec to  continue calcium and vit D. DEXA 1-2 yrs. Started prolia 7/2020, next 7/2021. dental PRN\par - High cholesterol: concern for worsening cholesterol due to AI. Pt is on zocor. Lipid profile annually.\par - Low Vitamin D: concern for worsening bone loss due to exemestane and low vit D. Discussed to increase Vit D intake. Check level today\par - Post mastectomy pain- resolved\par - BW as per PALLAS protocol \par 	\par -lupron q3m, prolia q6m\par RTC 3 months MD\par

## 2021-07-16 NOTE — REASON FOR VISIT
[Follow-Up Visit] : a follow-up [Other: _____] : [unfilled] [FreeTextEntry2] : breast cancer ER/[place] +  HER-2 -

## 2021-07-18 DIAGNOSIS — C79.51 SECONDARY MALIGNANT NEOPLASM OF BONE: ICD-10-CM

## 2021-07-19 ENCOUNTER — RESULT REVIEW (OUTPATIENT)
Age: 51
End: 2021-07-19

## 2021-07-20 LAB
ALBUMIN SERPL ELPH-MCNC: 4.8 G/DL
ALP BLD-CCNC: 61 U/L
ALT SERPL-CCNC: 28 U/L
ANION GAP SERPL CALC-SCNC: 14 MMOL/L
AST SERPL-CCNC: 21 U/L
BILIRUB SERPL-MCNC: 0.8 MG/DL
BUN SERPL-MCNC: 16 MG/DL
CALCIUM SERPL-MCNC: 9.8 MG/DL
CHLORIDE SERPL-SCNC: 104 MMOL/L
CO2 SERPL-SCNC: 24 MMOL/L
CREAT SERPL-MCNC: 0.81 MG/DL
ESTRADIOL SERPL-MCNC: 5 PG/ML
FSH SERPL-MCNC: 5.1 IU/L
GLUCOSE SERPL-MCNC: 115 MG/DL
POTASSIUM SERPL-SCNC: 4.3 MMOL/L
PROT SERPL-MCNC: 7.7 G/DL
SODIUM SERPL-SCNC: 143 MMOL/L

## 2021-07-26 ENCOUNTER — APPOINTMENT (OUTPATIENT)
Dept: ULTRASOUND IMAGING | Facility: IMAGING CENTER | Age: 51
End: 2021-07-26
Payer: MEDICAID

## 2021-07-26 ENCOUNTER — OUTPATIENT (OUTPATIENT)
Dept: OUTPATIENT SERVICES | Facility: HOSPITAL | Age: 51
LOS: 1 days | End: 2021-07-26
Payer: MEDICAID

## 2021-07-26 ENCOUNTER — RESULT REVIEW (OUTPATIENT)
Age: 51
End: 2021-07-26

## 2021-07-26 DIAGNOSIS — Z90.12 ACQUIRED ABSENCE OF LEFT BREAST AND NIPPLE: Chronic | ICD-10-CM

## 2021-07-26 DIAGNOSIS — C50.919 MALIGNANT NEOPLASM OF UNSPECIFIED SITE OF UNSPECIFIED FEMALE BREAST: ICD-10-CM

## 2021-07-26 PROCEDURE — 76642 ULTRASOUND BREAST LIMITED: CPT | Mod: 26,LT

## 2021-07-26 PROCEDURE — 76642 ULTRASOUND BREAST LIMITED: CPT

## 2021-07-29 ENCOUNTER — APPOINTMENT (OUTPATIENT)
Dept: NEPHROLOGY | Facility: CLINIC | Age: 51
End: 2021-07-29

## 2021-07-30 ENCOUNTER — RESULT REVIEW (OUTPATIENT)
Age: 51
End: 2021-07-30

## 2021-07-30 ENCOUNTER — APPOINTMENT (OUTPATIENT)
Dept: ULTRASOUND IMAGING | Facility: IMAGING CENTER | Age: 51
End: 2021-07-30
Payer: MEDICAID

## 2021-07-30 ENCOUNTER — OUTPATIENT (OUTPATIENT)
Dept: OUTPATIENT SERVICES | Facility: HOSPITAL | Age: 51
LOS: 1 days | End: 2021-07-30
Payer: MEDICAID

## 2021-07-30 DIAGNOSIS — Z00.8 ENCOUNTER FOR OTHER GENERAL EXAMINATION: ICD-10-CM

## 2021-07-30 DIAGNOSIS — Z90.12 ACQUIRED ABSENCE OF LEFT BREAST AND NIPPLE: Chronic | ICD-10-CM

## 2021-07-30 PROCEDURE — 88305 TISSUE EXAM BY PATHOLOGIST: CPT | Mod: 26

## 2021-07-30 PROCEDURE — 19083 BX BREAST 1ST LESION US IMAG: CPT | Mod: LT

## 2021-07-31 PROCEDURE — 88305 TISSUE EXAM BY PATHOLOGIST: CPT | Mod: 26

## 2021-07-31 PROCEDURE — A4648: CPT

## 2021-07-31 PROCEDURE — 88173 CYTOPATH EVAL FNA REPORT: CPT | Mod: 26

## 2021-07-31 PROCEDURE — 88305 TISSUE EXAM BY PATHOLOGIST: CPT

## 2021-07-31 PROCEDURE — 19083 BX BREAST 1ST LESION US IMAG: CPT

## 2021-07-31 PROCEDURE — 88173 CYTOPATH EVAL FNA REPORT: CPT

## 2021-10-12 ENCOUNTER — APPOINTMENT (OUTPATIENT)
Dept: NEPHROLOGY | Facility: CLINIC | Age: 51
End: 2021-10-12
Payer: MEDICAID

## 2021-10-12 VITALS
DIASTOLIC BLOOD PRESSURE: 89 MMHG | WEIGHT: 148.81 LBS | HEIGHT: 62 IN | SYSTOLIC BLOOD PRESSURE: 143 MMHG | TEMPERATURE: 97.7 F | HEART RATE: 69 BPM | OXYGEN SATURATION: 100 % | BODY MASS INDEX: 27.38 KG/M2

## 2021-10-12 VITALS — DIASTOLIC BLOOD PRESSURE: 80 MMHG | HEART RATE: 80 BPM | SYSTOLIC BLOOD PRESSURE: 130 MMHG

## 2021-10-12 DIAGNOSIS — I10 ESSENTIAL (PRIMARY) HYPERTENSION: ICD-10-CM

## 2021-10-12 DIAGNOSIS — I72.2 ANEURYSM OF RENAL ARTERY: ICD-10-CM

## 2021-10-12 PROCEDURE — 99214 OFFICE O/P EST MOD 30 MIN: CPT

## 2021-10-12 RX ORDER — AMLODIPINE BESYLATE 5 MG/1
5 TABLET ORAL DAILY
Qty: 30 | Refills: 9 | Status: ACTIVE | COMMUNITY
Start: 2019-09-27

## 2021-10-12 NOTE — ASSESSMENT
[FreeTextEntry1] : 52 yo female with breast cancer, HTN, borderline DM, hyperlipidemia with left renal artery aneurysm\par Breast cancer following with Cedric closely\par HTN: BP improved on repeat. Has reactive component.  Check blood pressure at home\par Continue losartan dose 100mg and amlodipine 5 mg daily at bedtime\par Low Na diet. \par Renal artery aneurysm - pt having financial issues at this time and prioritizing her medical followups and procedure\par Followup with me in 6 months\par

## 2021-10-12 NOTE — HISTORY OF PRESENT ILLNESS
[FreeTextEntry1] : 52 yo female with HTN,  left renal artery aneurysm, breast cancer\par She is on exemestane with maryan\par Saw Dr Florian - show stable aneurysm on doppler\par She has not been taking her BPs

## 2021-10-12 NOTE — PHYSICAL EXAM
[General Appearance - Alert] : alert [General Appearance - In No Acute Distress] : in no acute distress [Sclera] : the sclera and conjunctiva were normal [Neck Appearance] : the appearance of the neck was normal [Auscultation Breath Sounds / Voice Sounds] : lungs were clear to auscultation bilaterally [Heart Rate And Rhythm] : heart rate was normal and rhythm regular [Heart Sounds] : normal S1 and S2 [Murmurs] : no murmurs [Heart Sounds Pericardial Friction Rub] : no pericardial rub [Edema] : there was no peripheral edema [Bowel Sounds] : normal bowel sounds [Abdomen Soft] : soft [No CVA Tenderness] : no ~M costovertebral angle tenderness [Involuntary Movements] : no involuntary movements were seen [] : no rash [No Focal Deficits] : no focal deficits [Oriented To Time, Place, And Person] : oriented to person, place, and time [Mood] : the mood was normal [Affect] : the affect was normal

## 2021-10-13 ENCOUNTER — OUTPATIENT (OUTPATIENT)
Dept: OUTPATIENT SERVICES | Facility: HOSPITAL | Age: 51
LOS: 1 days | Discharge: HOME | End: 2021-10-13

## 2021-10-13 DIAGNOSIS — Z90.12 ACQUIRED ABSENCE OF LEFT BREAST AND NIPPLE: Chronic | ICD-10-CM

## 2021-10-13 DIAGNOSIS — C50.912 MALIGNANT NEOPLASM OF UNSPECIFIED SITE OF LEFT FEMALE BREAST: ICD-10-CM

## 2021-10-15 ENCOUNTER — APPOINTMENT (OUTPATIENT)
Dept: HEMATOLOGY ONCOLOGY | Facility: CLINIC | Age: 51
End: 2021-10-15
Payer: MEDICAID

## 2021-10-15 ENCOUNTER — APPOINTMENT (OUTPATIENT)
Dept: INFUSION THERAPY | Facility: HOSPITAL | Age: 51
End: 2021-10-15

## 2021-10-15 VITALS
HEART RATE: 65 BPM | DIASTOLIC BLOOD PRESSURE: 87 MMHG | WEIGHT: 149.89 LBS | SYSTOLIC BLOOD PRESSURE: 147 MMHG | OXYGEN SATURATION: 99 % | TEMPERATURE: 97.3 F | HEIGHT: 61.5 IN | RESPIRATION RATE: 16 BRPM | BODY MASS INDEX: 27.94 KG/M2

## 2021-10-15 PROCEDURE — 99214 OFFICE O/P EST MOD 30 MIN: CPT

## 2021-10-16 NOTE — PHYSICAL EXAM
[Restricted in physically strenuous activity but ambulatory and able to carry out work of a light or sedentary nature] : Status 1- Restricted in physically strenuous activity but ambulatory and able to carry out work of a light or sedentary nature, e.g., light house work, office work [Normal] : affect appropriate [de-identified] : s/p left mastectomy with reconstruction, noted to have a palpable findings at 2:00 left reconstruction: stable since last visit. s/p right breast reduction mammoplasty for symmetry

## 2021-10-16 NOTE — HISTORY OF PRESENT ILLNESS
[Date: ____________] : Patient's last distress assessment performed on [unfilled]. [6 - Distress Level] : Distress Level: 6 [Patient given social work contact information and resource sheet] : Patient was given social work contact information and resource sheet [IIB] : IIB [de-identified] : This is a very pleasant 48- year-old premenopausal lady who was diagnosed with breast cancer and is here for a consultation today. Her oncologic history is follows:\par \par She underwent routine breast imaging on 5/4/17 which showed a spiculated lesion measuring 2.2 x 2.9 cm in the upper outer left breast. She underwent left breast 2:00 lesion core biopsy on 6/9/17 which showed invasive well-differentiated ductal carcinoma, Ej score 5/9, measuring 1.5 cm, ER 95%, [place] 10% HER-2/shaila negative. She underwent a breast MRI on 7/15/17 which showed additional concerning areas for which biopsy was recommended. She underwent left breast 3:00 MRI biopsy on 7/18/17 which showed invasive well-differentiated ductal carcinoma, measuring 0.6 cm, Ej score 5/9 ER more than 90%, MS more than 90% HER-2/shaila negative. \par \par She underwent left mastectomy and left sentinel axillary lymph node dissection on 9/28/17 which showed invasive moderately differentiated ductal carcinoma, [place] grade 2/3, measuring 4 cm, 0.3 cm from the margin. Lymphovascular invasion was noted. Low/intermediate grade DCIS was noted. One sentinel lymph node was positive for metastasis measuring 0.3 cm. 10 additional lymph nodes were negative.\par \par s/p TC x4 completed in January.2018\par superficial phlebitis in the right arm after cycle 4 chemo. No s/f thrombus felt but noted to have focal superficial thrombosis on sono.therefore started on OS+AI instead of tamoxifen [de-identified] : \par GENETICS: CANCERNEXT AMBRY PANEL NEGATIVE \par  [FreeTextEntry1] : lupron started 2/2018\par exemestane 3/2018 [de-identified] : Ms. OSKAR SHARMA  is here for a follow up for left breast cancer on Lupron since 2/2018, and Exemestane since 3/2018, on Q3M lupron\par Takes exemestane daily, good compliance. Bone pain resolved. Bone scan 10/2020 [facility]. She is on gabapentin for insomnia, hot flashes and pain. She was prescribed effexor for hot flashes but felt very tired and sleepy therefore was not using it. Hot flashes are still present but tolerable. She is active, no change in energy, wt or appetite.  No vag dryness, no excessive fatigue. Hair loss stable with biotin. \par LMP 11/15/17. not sexually active. On Lupron q3m, no vag spotting. FSH 4-5 range. Doesn't want BSO. Check FSH/E today. if FSH> 25, would stop lupron\par Mammo/sono right breast 3/16/21 BIRADS-2, \par noted to have a palpable findings at 2:00 left reconstruction 7/2021, s/p bx benign. Will f/u 3/2022 WITH ANNUAL IMAGING. Nodule is not growing or changing\par Dexa 6/2020 OSTEOPOROSIS, started prolia in july 2020. Last dose 7/2021 Takes calcium and vit D \par ct a/p 8/2020: Stable renal artery aneurysm \par s/p covid vaccine

## 2021-10-16 NOTE — REASON FOR VISIT
[Follow-Up Visit] : a follow-up [Other: _____] : [unfilled] [FreeTextEntry2] : breast cancer ER/OK +  HER-2 -

## 2021-10-16 NOTE — ASSESSMENT
[Curative] : Goals of care discussed with patient: Curative [FreeTextEntry1] : This is a 48-year-old very pleasant premenopausal lady, who is diagnosed with stage IIB (T2, N1, M0) ER/[place] positive and HER-2/shaila negative left breast well to moderately differentiated invasive ductal carcinoma. She is status post left mastectomy and axillary lymph node dissection on 10/20/17. Intermediate oncotype. Completed TC x 4. BRCA NEGATIVE. Consented for [facility] and on arm B\par \par - Breast ca:  She is tolerating exemestane very well without significant side effects.  Reports good compliance. Plan to continue AI for total of 5-10 years. She switched to Q3M lupron for convenience. she will report vag bleeding spotting. Estradiol suppressed and FSH is low 5/2019. Doesn't want BSO. Check FSH/E today. if FSH> 25, would stop lupron.\par She is up to date with breast imaging 3/2021 BIRADS 2  Follows with Dr Lees . She is noted to have a palpable findings at 2:00 left reconstruction, bx 7/2021 benign. Nodule unchanged. repeat imaging 3/2022\par  \par - Aromatase inhibitor induced arthralgia: Tolerable but has worsened post COVID infection. She does not want AI break at this time. She has not been able to return to work. Rec stretching exercises, physical therapy and ortho f/u. Continue Gabapentin PRN\par - Hot flashes: 2/2 exemestane: Advised to wear layers and use fan prn. She could not tolerate effexor, but takes gabapentin prn.\par - Hair thinning- Likely 2/2 AI. Stable with Biotin\par - Osteoporosis: concern for worsening bone density due to exemestane. Rec to  continue calcium and vit D. DEXA 1-2 yrs. Started prolia 7/2020. PROLIA TODAY. dental PRN\par - High cholesterol: concern for worsening cholesterol due to AI. Pt is on zocor. Lipid profile annually.\par - Low Vitamin D: concern for worsening bone loss due to exemestane and low vit D. Discussed to increase Vit D intake. Check level today\par - Post mastectomy pain- resolved\par - BW as per [facility] protocol \par 	\par -lupron q3m, prolia q6m\par RTC 3 months MD\par

## 2021-11-01 ENCOUNTER — NON-APPOINTMENT (OUTPATIENT)
Age: 51
End: 2021-11-01

## 2021-11-16 NOTE — H&P PST ADULT - LAST STRESS TEST
Quality 226: Preventive Care And Screening: Tobacco Use: Screening And Cessation Intervention: Patient screened for tobacco use and is an ex/non-smoker
Quality 111:Pneumonia Vaccination Status For Older Adults: Pneumococcal Vaccination Previously Received
Quality 431: Preventive Care And Screening: Unhealthy Alcohol Use - Screening: Patient not identified as an unhealthy alcohol user when screened for unhealthy alcohol use using a systematic screening method
Detail Level: Detailed
None
Quality 47: Advance Care Plan: Advance Care Planning discussed and documented in the medical record; patient did not wish or was not able to name a surrogate decision maker or provide an advance care plan.
Quality 130: Documentation Of Current Medications In The Medical Record: Current Medications Documented

## 2022-01-25 NOTE — RESULTS/DATA
[FreeTextEntry1] : Laboratory data, radiology and pathology reviewed in detail at the time of visit.\par 
1) Continue regular diet. 2) Monitor PO intake, labs, GI function weights.

## 2022-02-03 ENCOUNTER — NON-APPOINTMENT (OUTPATIENT)
Age: 52
End: 2022-02-03

## 2022-02-24 ENCOUNTER — APPOINTMENT (OUTPATIENT)
Dept: VASCULAR SURGERY | Facility: CLINIC | Age: 52
End: 2022-02-24

## 2022-04-11 ENCOUNTER — OUTPATIENT (OUTPATIENT)
Dept: OUTPATIENT SERVICES | Facility: HOSPITAL | Age: 52
LOS: 1 days | Discharge: HOME | End: 2022-04-11

## 2022-04-11 DIAGNOSIS — Z90.12 ACQUIRED ABSENCE OF LEFT BREAST AND NIPPLE: Chronic | ICD-10-CM

## 2022-04-11 DIAGNOSIS — C50.912 MALIGNANT NEOPLASM OF UNSPECIFIED SITE OF LEFT FEMALE BREAST: ICD-10-CM

## 2022-05-13 ENCOUNTER — APPOINTMENT (OUTPATIENT)
Dept: NEPHROLOGY | Facility: CLINIC | Age: 52
End: 2022-05-13

## 2022-05-24 ENCOUNTER — OUTPATIENT (OUTPATIENT)
Dept: OUTPATIENT SERVICES | Facility: HOSPITAL | Age: 52
LOS: 1 days | Discharge: HOME | End: 2022-05-24

## 2022-05-24 DIAGNOSIS — C50.912 MALIGNANT NEOPLASM OF UNSPECIFIED SITE OF LEFT FEMALE BREAST: ICD-10-CM

## 2022-05-24 DIAGNOSIS — Z90.12 ACQUIRED ABSENCE OF LEFT BREAST AND NIPPLE: Chronic | ICD-10-CM

## 2022-05-27 ENCOUNTER — NON-APPOINTMENT (OUTPATIENT)
Age: 52
End: 2022-05-27

## 2022-05-27 ENCOUNTER — APPOINTMENT (OUTPATIENT)
Dept: HEMATOLOGY ONCOLOGY | Facility: CLINIC | Age: 52
End: 2022-05-27

## 2022-05-27 ENCOUNTER — APPOINTMENT (OUTPATIENT)
Dept: INFUSION THERAPY | Facility: HOSPITAL | Age: 52
End: 2022-05-27

## 2022-06-01 ENCOUNTER — NON-APPOINTMENT (OUTPATIENT)
Age: 52
End: 2022-06-01

## 2022-06-03 ENCOUNTER — RESULT REVIEW (OUTPATIENT)
Age: 52
End: 2022-06-03

## 2022-06-03 ENCOUNTER — APPOINTMENT (OUTPATIENT)
Dept: HEMATOLOGY ONCOLOGY | Facility: CLINIC | Age: 52
End: 2022-06-03
Payer: MEDICAID

## 2022-06-03 ENCOUNTER — APPOINTMENT (OUTPATIENT)
Dept: INFUSION THERAPY | Facility: HOSPITAL | Age: 52
End: 2022-06-03

## 2022-06-03 VITALS
HEIGHT: 62 IN | SYSTOLIC BLOOD PRESSURE: 143 MMHG | DIASTOLIC BLOOD PRESSURE: 83 MMHG | HEART RATE: 60 BPM | RESPIRATION RATE: 16 BRPM | WEIGHT: 151.68 LBS | OXYGEN SATURATION: 99 % | BODY MASS INDEX: 27.91 KG/M2 | TEMPERATURE: 97.3 F

## 2022-06-03 DIAGNOSIS — Z51.11 ENCOUNTER FOR ANTINEOPLASTIC CHEMOTHERAPY: ICD-10-CM

## 2022-06-03 DIAGNOSIS — Z00.6 ENCOUNTER FOR EXAMINATION FOR NORMAL COMPARISON AND CONTROL IN CLINICAL RESEARCH PROGRAM: ICD-10-CM

## 2022-06-03 DIAGNOSIS — C79.51 SECONDARY MALIGNANT NEOPLASM OF BONE: ICD-10-CM

## 2022-06-03 LAB
BASOPHILS # BLD AUTO: 0.06 K/UL — SIGNIFICANT CHANGE UP (ref 0–0.2)
BASOPHILS NFR BLD AUTO: 0.7 % — SIGNIFICANT CHANGE UP (ref 0–2)
EOSINOPHIL # BLD AUTO: 0.31 K/UL — SIGNIFICANT CHANGE UP (ref 0–0.5)
EOSINOPHIL NFR BLD AUTO: 3.4 % — SIGNIFICANT CHANGE UP (ref 0–6)
HCT VFR BLD CALC: 40.8 % — SIGNIFICANT CHANGE UP (ref 34.5–45)
HGB BLD-MCNC: 13.7 G/DL — SIGNIFICANT CHANGE UP (ref 11.5–15.5)
IMM GRANULOCYTES NFR BLD AUTO: 0.4 % — SIGNIFICANT CHANGE UP (ref 0–1.5)
LYMPHOCYTES # BLD AUTO: 2.99 K/UL — SIGNIFICANT CHANGE UP (ref 1–3.3)
LYMPHOCYTES # BLD AUTO: 32.4 % — SIGNIFICANT CHANGE UP (ref 13–44)
MCHC RBC-ENTMCNC: 29.6 PG — SIGNIFICANT CHANGE UP (ref 27–34)
MCHC RBC-ENTMCNC: 33.6 G/DL — SIGNIFICANT CHANGE UP (ref 32–36)
MCV RBC AUTO: 88.1 FL — SIGNIFICANT CHANGE UP (ref 80–100)
MONOCYTES # BLD AUTO: 0.55 K/UL — SIGNIFICANT CHANGE UP (ref 0–0.9)
MONOCYTES NFR BLD AUTO: 6 % — SIGNIFICANT CHANGE UP (ref 2–14)
NEUTROPHILS # BLD AUTO: 5.28 K/UL — SIGNIFICANT CHANGE UP (ref 1.8–7.4)
NEUTROPHILS NFR BLD AUTO: 57.1 % — SIGNIFICANT CHANGE UP (ref 43–77)
NRBC # BLD: 0 /100 WBCS — SIGNIFICANT CHANGE UP (ref 0–0)
PLATELET # BLD AUTO: 239 K/UL — SIGNIFICANT CHANGE UP (ref 150–400)
RBC # BLD: 4.63 M/UL — SIGNIFICANT CHANGE UP (ref 3.8–5.2)
RBC # FLD: 13.2 % — SIGNIFICANT CHANGE UP (ref 10.3–14.5)
WBC # BLD: 9.23 K/UL — SIGNIFICANT CHANGE UP (ref 3.8–10.5)
WBC # FLD AUTO: 9.23 K/UL — SIGNIFICANT CHANGE UP (ref 3.8–10.5)

## 2022-06-03 PROCEDURE — 99215 OFFICE O/P EST HI 40 MIN: CPT

## 2022-06-03 NOTE — PHYSICAL EXAM
[Restricted in physically strenuous activity but ambulatory and able to carry out work of a light or sedentary nature] : Status 1- Restricted in physically strenuous activity but ambulatory and able to carry out work of a light or sedentary nature, e.g., light house work, office work [Normal] : well developed, well nourished, in no acute distress [de-identified] : redness to left eye [de-identified] : s/p left mastectomy with reconstruction, noted to have a palpable findings at 2:00 left reconstruction: stable since last visit. s/p right breast reduction mammoplasty for symmetry

## 2022-06-03 NOTE — REASON FOR VISIT
[Follow-Up Visit] : a follow-up [Other: _____] : [unfilled] [FreeTextEntry2] : breast cancer ER/[place] +  HER-2 -

## 2022-06-03 NOTE — ASSESSMENT
[Curative] : Goals of care discussed with patient: Curative [FreeTextEntry1] : This is a 48-year-old very pleasant premenopausal lady, who is diagnosed with stage IIB (T2, N1, M0) ER/OK positive and HER-2/shaila negative left breast well to moderately differentiated invasive ductal carcinoma. She is status post left mastectomy and axillary lymph node dissection on 10/20/17. Intermediate oncotype. Completed TC x 4. BRCA NEGATIVE. Consented for PALLAS and on arm B\par \par - Breast ca:  She is tolerating exemestane very well without significant side effects.  Reports good compliance. Plan to continue AI for total of 5-10 years. She switched to Q3M lupron for convenience. Last Eligard was 10/2021 due to insurance issues. She has not had no vag spotting/ bleeding. Estradiol suppressed and FSH is low 7/2021. Doesn't want BSO. Check FSH/Estradiol next visit as last Lupron was 10/2021. When  FSH> 25, would stop lupron.\par She is overdue with breast imaging 3/2021 BIRADS 2  Follows with Dr Lees. She is noted to have a palpable findings at 2:00 left reconstruction, bx 7/2021 benign. Nodule unchanged. Repeat imaging 3/2022. \par  \par - Aromatase inhibitor induced arthralgia: Tolerable but has worsened post COVID infection. She will continue with AI.  She has not been able to return to work. Rec stretching exercises, physical therapy and ortho f/u. Continue Gabapentin PRN\par - Hot flashes: 2/2 exemestane: Advised to wear layers and use fan prn. She could not tolerate effexor, but takes gabapentin prn.\par - Hair thinning- Likely 2/2 AI. Stable with Biotin\par - Osteoporosis: concern for worsening bone density due to exemestane. Rec to  continue calcium and vit D. DEXA 1-2 yrs.Due Now. Started prolia 7/2020  Last dose 7/2021 will restart PROLIA TODAY 6/3/2022. dental PRN Will monitor Ca levels prn. CMP today\par - High cholesterol: concern for worsening cholesterol due to AI. Pt is on zocor. Lipid profile annually.\par - Low Vitamin D: concern for worsening bone loss due to exemestane and low vit D. Discussed to increase Vit D intake. Check level today\par - Post mastectomy pain- resolved\par -Patient is in [facility]AS clinical  trial f/u phase  patient re consented today 6/3/2022\par 	\par -lupron q3m, prolia q6m\par RTC 3 months MD\par

## 2022-06-07 LAB
25(OH)D3 SERPL-MCNC: 23.7 NG/ML
ALBUMIN SERPL ELPH-MCNC: 5 G/DL
ALP BLD-CCNC: 99 U/L
ALT SERPL-CCNC: 26 U/L
ANION GAP SERPL CALC-SCNC: 10 MMOL/L
AST SERPL-CCNC: 22 U/L
BILIRUB SERPL-MCNC: 0.8 MG/DL
BUN SERPL-MCNC: 16 MG/DL
CALCIUM SERPL-MCNC: 10.1 MG/DL
CHLORIDE SERPL-SCNC: 104 MMOL/L
CO2 SERPL-SCNC: 28 MMOL/L
CREAT SERPL-MCNC: 0.72 MG/DL
EGFR: 100 ML/MIN/1.73M2
GLUCOSE SERPL-MCNC: 93 MG/DL
POTASSIUM SERPL-SCNC: 4.4 MMOL/L
PROT SERPL-MCNC: 7.8 G/DL
SODIUM SERPL-SCNC: 142 MMOL/L

## 2022-06-12 NOTE — HISTORY OF PRESENT ILLNESS
General [Date: ____________] : Patient's last distress assessment performed on [unfilled]. [6 - Distress Level] : Distress Level: 6 General [Patient given social work contact information and resource sheet] : Patient was given social work contact information and resource sheet General [IIB] : IIB [de-identified] : This is a very pleasant 48- year-old premenopausal lady who was diagnosed with breast cancer and is here for a consultation today. Her oncologic history is follows:\par \par She underwent routine breast imaging on 5/4/17 which showed a spiculated lesion measuring 2.2 x 2.9 cm in the upper outer left breast. She underwent left breast 2:00 lesion core biopsy on 6/9/17 which showed invasive well-differentiated ductal carcinoma, Ej score 5/9, measuring 1.5 cm, ER 95%, [place] 10% HER-2/shaila negative. She underwent a breast MRI on 7/15/17 which showed additional concerning areas for which biopsy was recommended. She underwent left breast 3:00 MRI biopsy on 7/18/17 which showed invasive well-differentiated ductal carcinoma, measuring 0.6 cm, Ej score 5/9 ER more than 90%, [place] more than 90% HER-2/shaila negative. \par \par She underwent left mastectomy and left sentinel axillary lymph node dissection on 9/28/17 which showed invasive moderately differentiated ductal carcinoma, [place] grade 2/3, measuring 4 cm, 0.3 cm from the margin. Lymphovascular invasion was noted. Low/intermediate grade DCIS was noted. One sentinel lymph node was positive for metastasis measuring 0.3 cm. 10 additional lymph nodes were negative.\par \par s/p TC x4 completed in January.2018\par superficial phlebitis in the right arm after cycle 4 chemo. No s/f thrombus felt but noted to have focal superficial thrombosis on sono.therefore started on OS+AI instead of tamoxifen [de-identified] : \par GENETICS: CANCERNEXT AMBRY PANEL NEGATIVE \par  [FreeTextEntry1] : lupron started 2/2018\par exemestane 3/2018 [de-identified] : Ms. OSKAR SHARMA  is here for a follow up for left breast cancer on Lupron since 2/2018, and Exemestane since 3/2018, on Q3M lupron\par Takes exemestane daily, good compliance. Bone pain resolved. Bone scan 10/2020 [facility]. She is on gabapentin for insomnia, hot flashes and pain. She was prescribed effexor for hot flashes but felt very tired and sleepy therefore was not using it. Hot flashes are still present but tolerable. She is active, no change in energy, wt or appetite.  No vag dryness, no excessive fatigue. Hair loss stable with biotin. \par LMP 11/15/17. not sexually active. On Lupron q3m, but last Eligard was 10/2021 due to insurance issues. .has not had no vag spotting/ bleeding. FSH 4-5 range. Doesn't want BSO. Check FSH/E next visit.. if FSH> 25, would stop lupron\par Mammo/sono right breast 3/16/21 BIRADS-2, overdue patient has insurance issues\par Noted to have a palpable findings at 2:00 left reconstruction 7/2021, s/p bx benign. Will f/u 3/2022 WITH ANNUAL IMAGING. Nodule is not growing or changing not palpable today\par Dexa 6/2020 OSTEOPOROSIS, started prolia in july 2020. Last dose 7/2021 will restart today 6/3/2022 Takes calcium and vit D \par ct a/p 8/2020: Stable renal artery aneurysm \par s/p Pfizer covid vaccine x 2 most recent 5/2021\par Patients reports right upper quad pain and nausea x several months, no vomiting, appetite good, f/b PCP had US abdomen [facility] on 5/28/22 report pending.\par  General

## 2022-06-17 ENCOUNTER — APPOINTMENT (OUTPATIENT)
Dept: ULTRASOUND IMAGING | Facility: IMAGING CENTER | Age: 52
End: 2022-06-17

## 2022-06-17 ENCOUNTER — APPOINTMENT (OUTPATIENT)
Dept: MAMMOGRAPHY | Facility: IMAGING CENTER | Age: 52
End: 2022-06-17

## 2022-06-17 ENCOUNTER — APPOINTMENT (OUTPATIENT)
Dept: RADIOLOGY | Facility: IMAGING CENTER | Age: 52
End: 2022-06-17

## 2022-06-22 ENCOUNTER — RESULT REVIEW (OUTPATIENT)
Age: 52
End: 2022-06-22

## 2022-07-20 ENCOUNTER — NON-APPOINTMENT (OUTPATIENT)
Age: 52
End: 2022-07-20

## 2022-08-19 ENCOUNTER — APPOINTMENT (OUTPATIENT)
Dept: RADIOLOGY | Facility: IMAGING CENTER | Age: 52
End: 2022-08-19

## 2022-08-19 ENCOUNTER — RESULT REVIEW (OUTPATIENT)
Age: 52
End: 2022-08-19

## 2022-08-19 ENCOUNTER — APPOINTMENT (OUTPATIENT)
Dept: ULTRASOUND IMAGING | Facility: IMAGING CENTER | Age: 52
End: 2022-08-19

## 2022-08-19 ENCOUNTER — OUTPATIENT (OUTPATIENT)
Dept: OUTPATIENT SERVICES | Facility: HOSPITAL | Age: 52
LOS: 1 days | End: 2022-08-19
Payer: COMMERCIAL

## 2022-08-19 ENCOUNTER — APPOINTMENT (OUTPATIENT)
Dept: MAMMOGRAPHY | Facility: IMAGING CENTER | Age: 52
End: 2022-08-19

## 2022-08-19 DIAGNOSIS — Z00.8 ENCOUNTER FOR OTHER GENERAL EXAMINATION: ICD-10-CM

## 2022-08-19 DIAGNOSIS — Z90.12 ACQUIRED ABSENCE OF LEFT BREAST AND NIPPLE: Chronic | ICD-10-CM

## 2022-08-19 PROCEDURE — 76642 ULTRASOUND BREAST LIMITED: CPT | Mod: 26,50

## 2022-08-19 PROCEDURE — 77080 DXA BONE DENSITY AXIAL: CPT | Mod: 26

## 2022-08-19 PROCEDURE — 76642 ULTRASOUND BREAST LIMITED: CPT

## 2022-08-19 PROCEDURE — G0279: CPT | Mod: 26

## 2022-08-19 PROCEDURE — 77065 DX MAMMO INCL CAD UNI: CPT | Mod: 26,RT

## 2022-08-19 PROCEDURE — 77065 DX MAMMO INCL CAD UNI: CPT

## 2022-08-19 PROCEDURE — G0279: CPT

## 2022-08-19 PROCEDURE — 77080 DXA BONE DENSITY AXIAL: CPT

## 2022-08-23 ENCOUNTER — NON-APPOINTMENT (OUTPATIENT)
Age: 52
End: 2022-08-23

## 2022-08-31 ENCOUNTER — OUTPATIENT (OUTPATIENT)
Dept: OUTPATIENT SERVICES | Facility: HOSPITAL | Age: 52
LOS: 1 days | Discharge: HOME | End: 2022-08-31

## 2022-08-31 DIAGNOSIS — Z90.12 ACQUIRED ABSENCE OF LEFT BREAST AND NIPPLE: Chronic | ICD-10-CM

## 2022-08-31 DIAGNOSIS — C50.912 MALIGNANT NEOPLASM OF UNSPECIFIED SITE OF LEFT FEMALE BREAST: ICD-10-CM

## 2022-09-02 ENCOUNTER — APPOINTMENT (OUTPATIENT)
Dept: INFUSION THERAPY | Facility: HOSPITAL | Age: 52
End: 2022-09-02

## 2022-10-21 ENCOUNTER — NON-APPOINTMENT (OUTPATIENT)
Age: 52
End: 2022-10-21

## 2022-10-21 ENCOUNTER — APPOINTMENT (OUTPATIENT)
Dept: HEMATOLOGY ONCOLOGY | Facility: CLINIC | Age: 52
End: 2022-10-21

## 2022-10-21 VITALS
TEMPERATURE: 97.3 F | DIASTOLIC BLOOD PRESSURE: 109 MMHG | OXYGEN SATURATION: 99 % | RESPIRATION RATE: 16 BRPM | BODY MASS INDEX: 26.9 KG/M2 | SYSTOLIC BLOOD PRESSURE: 164 MMHG | HEART RATE: 65 BPM | WEIGHT: 147.05 LBS

## 2022-10-21 PROCEDURE — 99214 OFFICE O/P EST MOD 30 MIN: CPT

## 2022-10-25 NOTE — HISTORY OF PRESENT ILLNESS
[Date: ____________] : Patient's last distress assessment performed on [unfilled]. [6 - Distress Level] : Distress Level: 6 [Patient given social work contact information and resource sheet] : Patient was given social work contact information and resource sheet [IIB] : IIB [de-identified] : This is a very pleasant 48- year-old premenopausal lady who was diagnosed with breast cancer and is here for a consultation today. Her oncologic history is follows:\par \par She underwent routine breast imaging on 5/4/17 which showed a spiculated lesion measuring 2.2 x 2.9 cm in the upper outer left breast. She underwent left breast 2:00 lesion core biopsy on 6/9/17 which showed invasive well-differentiated ductal carcinoma, Ej score 5/9, measuring 1.5 cm, ER 95%, [place] 10% HER-2/shaila negative. She underwent a breast MRI on 7/15/17 which showed additional concerning areas for which biopsy was recommended. She underwent left breast 3:00 MRI biopsy on 7/18/17 which showed invasive well-differentiated ductal carcinoma, measuring 0.6 cm, Ej score 5/9 ER more than 90%, [place] more than 90% HER-2/shaila negative. \par \par She underwent left mastectomy and left sentinel axillary lymph node dissection on 9/28/17 which showed invasive moderately differentiated ductal carcinoma, [place] grade 2/3, measuring 4 cm, 0.3 cm from the margin. Lymphovascular invasion was noted. Low/intermediate grade DCIS was noted. One sentinel lymph node was positive for metastasis measuring 0.3 cm. 10 additional lymph nodes were negative.\par \par s/p TC x4 completed in January.2018\par superficial phlebitis in the right arm after cycle 4 chemo. No s/f thrombus felt but noted to have focal superficial thrombosis on sono.therefore started on OS+AI instead of tamoxifen [de-identified] : \par GENETICS: CANCERNEXT AMBRY PANEL NEGATIVE \par  [FreeTextEntry1] : lupron started 2/2018\par exemestane 3/2018 [de-identified] : Ms. OSKAR SHARMA  is here for a follow up for left breast cancer on Lupron since 2/2018, and Exemestane since 3/2018, on Q3M lupron\par \par Takes exemestane daily, good compliance. Bone pain resolved. Bone scan 10/2020 [facility]AHAIRA. She was prescribed effexor and gabapentin for hot flashes but felt very tired and sleepy therefore was not using it. Hot flashes are still present but tolerable. She is active, no change in energy, wt or appetite.  No vag dryness, no excessive fatigue. Hair loss stable with biotin. \par LMP 11/15/17. not sexually active. On Lupron q3m, but last Eligard was 10/2021 due to insurance issues. .has not had no vag spotting/ bleeding. FSH 4-5 range. Doesn't want BSO. Check FSH/E next visit.. if FSH> 25, would stop lupron\par Mammo/sono right breast 8/2022 birads 2\par Dexa 6/2020 OSTEOPOROSIS, started prolia in july 2020. dexa 8/2022 improvement. T score -2.1. Continue prolia\par ct a/p 8/2020: Stable renal artery aneurysm

## 2022-10-25 NOTE — ASSESSMENT
[Curative] : Goals of care discussed with patient: Curative [FreeTextEntry1] : This is a 48-year-old very pleasant premenopausal lady, who is diagnosed with stage IIB (T2, N1, M0) ER/VA positive and HER-2/shaila negative left breast well to moderately differentiated invasive ductal carcinoma. She is status post left mastectomy and axillary lymph node dissection on 10/20/17. Intermediate oncotype. Completed TC x 4. BRCA NEGATIVE. Consented for PALLAS and on arm B\par \par - Breast ca:  She is tolerating exemestane very well without significant side effects.  Reports good compliance. Plan to continue AI for total of 10 years due to node+ disease. She switched to Q3M lupron for convenience.  Estradiol suppressed and FSH is low 7/2021. Doesn't want BSO. Up to date with breast imaging\par - Aromatase inhibitor induced arthralgia:  Rec stretching exercises, physical therapy and ortho f/u. Continue Gabapentin PRN\par - Hot flashes: 2/2 exemestane: Advised to wear layers and use fan prn. She could not tolerate effexor, but takes gabapentin prn.\par - Hair thinning- Likely 2/2 AI. Stable with Biotin\par - Osteoporosis: concern for worsening bone density due to exemestane. Rec to  continue calcium and vit D. DEXA 1-2 yrs.Due Now. Started prolia 7/2020  dental PRN Will monitor Ca levels prn. CMP today. continue prolia q6m\par - High cholesterol: concern for worsening cholesterol due to AI. Pt is on zocor. Lipid profile annually.\par - Low Vitamin D: concern for worsening bone loss due to exemestane and low vit D. Discussed to increase Vit D intake. Check level today\par - Post mastectomy pain- resolved\par -Patient is in PALLAS clinical  trial f/u phase  \par 	\par -lupron q3m, prolia q6m\par RTC 3 months MD\par

## 2022-10-25 NOTE — REASON FOR VISIT
[Follow-Up Visit] : a follow-up [Other: _____] : [unfilled] [FreeTextEntry2] : breast cancer ER/MI +  HER-2 -

## 2022-11-29 NOTE — H&P PST ADULT - SYMPTOMS
----- Message from Josy Cobb sent at 11/29/2022  1:28 PM CST -----  Regarding: Appt  Contact: Pt [phone]  Patient calling to schedule new patient appt states her prior provider is closing the practice please call to discuss further     
Called and informed patient prior to scheduling we would need Referral and records. Patient will call and check on progress or obtaining records,disc, referral  
none

## 2022-11-30 ENCOUNTER — OUTPATIENT (OUTPATIENT)
Dept: OUTPATIENT SERVICES | Facility: HOSPITAL | Age: 52
LOS: 1 days | Discharge: HOME | End: 2022-11-30

## 2022-11-30 DIAGNOSIS — C50.912 MALIGNANT NEOPLASM OF UNSPECIFIED SITE OF LEFT FEMALE BREAST: ICD-10-CM

## 2022-11-30 DIAGNOSIS — Z90.12 ACQUIRED ABSENCE OF LEFT BREAST AND NIPPLE: Chronic | ICD-10-CM

## 2022-12-02 ENCOUNTER — RESULT REVIEW (OUTPATIENT)
Age: 52
End: 2022-12-02

## 2022-12-02 ENCOUNTER — APPOINTMENT (OUTPATIENT)
Dept: HEMATOLOGY ONCOLOGY | Facility: CLINIC | Age: 52
End: 2022-12-02

## 2022-12-02 ENCOUNTER — APPOINTMENT (OUTPATIENT)
Dept: INFUSION THERAPY | Facility: HOSPITAL | Age: 52
End: 2022-12-02

## 2022-12-02 VITALS
OXYGEN SATURATION: 98 % | SYSTOLIC BLOOD PRESSURE: 157 MMHG | TEMPERATURE: 97.2 F | DIASTOLIC BLOOD PRESSURE: 97 MMHG | RESPIRATION RATE: 16 BRPM | BODY MASS INDEX: 27.66 KG/M2 | HEIGHT: 61.97 IN | WEIGHT: 150.33 LBS | HEART RATE: 67 BPM

## 2022-12-02 DIAGNOSIS — C79.51 SECONDARY MALIGNANT NEOPLASM OF BONE: ICD-10-CM

## 2022-12-02 DIAGNOSIS — L65.9 NONSCARRING HAIR LOSS, UNSPECIFIED: ICD-10-CM

## 2022-12-02 DIAGNOSIS — E55.9 VITAMIN D DEFICIENCY, UNSPECIFIED: ICD-10-CM

## 2022-12-02 LAB
ALBUMIN SERPL ELPH-MCNC: 4.4 G/DL
ALP BLD-CCNC: 52 U/L
ALT SERPL-CCNC: 23 U/L
ANION GAP SERPL CALC-SCNC: 9 MMOL/L
AST SERPL-CCNC: 18 U/L
BASOPHILS # BLD AUTO: 0.04 K/UL — SIGNIFICANT CHANGE UP (ref 0–0.2)
BASOPHILS NFR BLD AUTO: 0.5 % — SIGNIFICANT CHANGE UP (ref 0–2)
BILIRUB SERPL-MCNC: 0.6 MG/DL
BUN SERPL-MCNC: 13 MG/DL
CALCIUM SERPL-MCNC: 9.8 MG/DL
CHLORIDE SERPL-SCNC: 107 MMOL/L
CO2 SERPL-SCNC: 28 MMOL/L
CREAT SERPL-MCNC: 0.74 MG/DL
EGFR: 96 ML/MIN/1.73M2
EOSINOPHIL # BLD AUTO: 0.31 K/UL — SIGNIFICANT CHANGE UP (ref 0–0.5)
EOSINOPHIL NFR BLD AUTO: 3.6 % — SIGNIFICANT CHANGE UP (ref 0–6)
GLUCOSE SERPL-MCNC: 99 MG/DL
HCT VFR BLD CALC: 38.9 % — SIGNIFICANT CHANGE UP (ref 34.5–45)
HGB BLD-MCNC: 12.7 G/DL — SIGNIFICANT CHANGE UP (ref 11.5–15.5)
IMM GRANULOCYTES NFR BLD AUTO: 0.2 % — SIGNIFICANT CHANGE UP (ref 0–0.9)
LYMPHOCYTES # BLD AUTO: 2.66 K/UL — SIGNIFICANT CHANGE UP (ref 1–3.3)
LYMPHOCYTES # BLD AUTO: 30.6 % — SIGNIFICANT CHANGE UP (ref 13–44)
MCHC RBC-ENTMCNC: 28.8 PG — SIGNIFICANT CHANGE UP (ref 27–34)
MCHC RBC-ENTMCNC: 32.6 G/DL — SIGNIFICANT CHANGE UP (ref 32–36)
MCV RBC AUTO: 88.2 FL — SIGNIFICANT CHANGE UP (ref 80–100)
MONOCYTES # BLD AUTO: 0.5 K/UL — SIGNIFICANT CHANGE UP (ref 0–0.9)
MONOCYTES NFR BLD AUTO: 5.7 % — SIGNIFICANT CHANGE UP (ref 2–14)
NEUTROPHILS # BLD AUTO: 5.17 K/UL — SIGNIFICANT CHANGE UP (ref 1.8–7.4)
NEUTROPHILS NFR BLD AUTO: 59.4 % — SIGNIFICANT CHANGE UP (ref 43–77)
NRBC # BLD: 0 /100 WBCS — SIGNIFICANT CHANGE UP (ref 0–0)
PLATELET # BLD AUTO: 242 K/UL — SIGNIFICANT CHANGE UP (ref 150–400)
POTASSIUM SERPL-SCNC: 4.2 MMOL/L
PROT SERPL-MCNC: 7 G/DL
RBC # BLD: 4.41 M/UL — SIGNIFICANT CHANGE UP (ref 3.8–5.2)
RBC # FLD: 13.1 % — SIGNIFICANT CHANGE UP (ref 10.3–14.5)
SODIUM SERPL-SCNC: 144 MMOL/L
WBC # BLD: 8.7 K/UL — SIGNIFICANT CHANGE UP (ref 3.8–10.5)
WBC # FLD AUTO: 8.7 K/UL — SIGNIFICANT CHANGE UP (ref 3.8–10.5)

## 2022-12-02 PROCEDURE — 99214 OFFICE O/P EST MOD 30 MIN: CPT

## 2022-12-02 RX ORDER — LATANOPROST/PF 0.005 %
0.01 DROPS OPHTHALMIC (EYE)
Qty: 2 | Refills: 0 | Status: ACTIVE | COMMUNITY
Start: 2022-11-10

## 2022-12-02 RX ORDER — FERROUS GLUCONATE 324(38)MG
324 (38 FE) TABLET ORAL
Qty: 30 | Refills: 0 | Status: ACTIVE | COMMUNITY
Start: 2022-07-02

## 2022-12-02 RX ORDER — AMOXICILLIN 500 MG/1
500 CAPSULE ORAL
Qty: 14 | Refills: 0 | Status: DISCONTINUED | COMMUNITY
Start: 2022-10-22

## 2022-12-02 RX ORDER — LORATADINE 10 MG/1
10 TABLET ORAL
Qty: 30 | Refills: 0 | Status: DISCONTINUED | COMMUNITY
Start: 2022-10-22

## 2022-12-02 RX ORDER — ASPIRIN ENTERIC COATED TABLETS 81 MG 81 MG/1
81 TABLET, DELAYED RELEASE ORAL
Qty: 30 | Refills: 0 | Status: ACTIVE | COMMUNITY
Start: 2022-07-02

## 2022-12-02 RX ORDER — BACLOFEN 10 MG/1
10 TABLET ORAL
Qty: 10 | Refills: 0 | Status: ACTIVE | COMMUNITY
Start: 2022-09-17

## 2022-12-02 RX ORDER — BLOOD-GLUCOSE METER
EACH MISCELLANEOUS
Qty: 1 | Refills: 0 | Status: ACTIVE | COMMUNITY
Start: 2022-07-23

## 2022-12-02 RX ORDER — HYDROCHLOROTHIAZIDE 12.5 MG/1
12.5 TABLET ORAL
Qty: 30 | Refills: 0 | Status: ACTIVE | COMMUNITY
Start: 2022-10-22

## 2022-12-02 NOTE — REASON FOR VISIT
[Follow-Up Visit] : a follow-up [Other: _____] : [unfilled] [FreeTextEntry2] : breast cancer ER/NE +  HER-2 -

## 2022-12-02 NOTE — PHYSICAL EXAM
[Restricted in physically strenuous activity but ambulatory and able to carry out work of a light or sedentary nature] : Status 1- Restricted in physically strenuous activity but ambulatory and able to carry out work of a light or sedentary nature, e.g., light house work, office work [Normal] : supple without JVD, no thyromegaly or masses appreciated [de-identified] : s/p left mastectomy with reconstruction, noted to have a palpable findings at 2:00 left reconstruction: stable since last visit. s/p right breast reduction mammoplasty for symmetry

## 2022-12-02 NOTE — HISTORY OF PRESENT ILLNESS
[Date: ____________] : Patient's last distress assessment performed on [unfilled]. [6 - Distress Level] : Distress Level: 6 [Patient given social work contact information and resource sheet] : Patient was given social work contact information and resource sheet [IIB] : IIB [de-identified] : This is a very pleasant 48- year-old premenopausal lady who was diagnosed with breast cancer and is here for a consultation today. Her oncologic history is follows:\par \par She underwent routine breast imaging on 5/4/17 which showed a spiculated lesion measuring 2.2 x 2.9 cm in the upper outer left breast. She underwent left breast 2:00 lesion core biopsy on 6/9/17 which showed invasive well-differentiated ductal carcinoma, Ej score 5/9, measuring 1.5 cm, ER 95%, NE 10% HER-2/shaila negative. She underwent a breast MRI on 7/15/17 which showed additional concerning areas for which biopsy was recommended. She underwent left breast 3:00 MRI biopsy on 7/18/17 which showed invasive well-differentiated ductal carcinoma, measuring 0.6 cm, Ej score 5/9 ER more than 90%, NE more than 90% HER-2/shaila negative. \par \par She underwent left mastectomy and left sentinel axillary lymph node dissection on 9/28/17 which showed invasive moderately differentiated ductal carcinoma, [place] grade 2/3, measuring 4 cm, 0.3 cm from the margin. Lymphovascular invasion was noted. Low/intermediate grade DCIS was noted. One sentinel lymph node was positive for metastasis measuring 0.3 cm. 10 additional lymph nodes were negative.\par \par s/p TC x4 completed in January.2018\par superficial phlebitis in the right arm after cycle 4 chemo. No s/f thrombus felt but noted to have focal superficial thrombosis on sono.therefore started on OS+AI instead of tamoxifen [de-identified] : \par GENETICS: CANCERNEXT AMBRY PANEL NEGATIVE \par  [FreeTextEntry1] : lupron started 2/2018\par exemestane 3/2018 [de-identified] : Ms. OSKAR SHARMA  is here for a follow up for left breast cancer on Lupron since 2/2018, and Exemestane since 3/2018, on Q3M lupron\par \par 12/2/22\par Takes exemestane daily, good compliance. Bone pain resolved. Bone scan 10/2020 [facility]. She was prescribed effexor and gabapentin for hot flashes but felt very tired and sleepy therefore was not using it. Hot flashes are still present but tolerable. Mild joint stiffness with prolonged inactivity.  She is active, no change in energy, wt or appetite.  No vag dryness, no excessive fatigue. Hair loss stable with biotin. \par LMP 11/15/17. not sexually active. On Lupron q3m, but last Eligard was 10/2021 due to insurance issues. .has not had no vag spotting/ bleeding. FSH 4-5 range. Doesn't want BSO. Check FSH/E next visit.. if FSH> 25, would stop lupron\par Mammo/sono right breast 8/2022 birads 2\par Dexa 6/2020 OSTEOPOROSIS, started prolia in july 2020. dexa 8/2022 improvement. T score -2.1. 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

## 2022-12-02 NOTE — ASSESSMENT
[Curative] : Goals of care discussed with patient: Curative [FreeTextEntry1] : This is a 48-year-old very pleasant premenopausal lady, who is diagnosed with stage IIB (T2, N1, M0) ER/ME positive and HER-2/shaila negative left breast well to moderately differentiated invasive ductal carcinoma. She is status post left mastectomy and axillary lymph node dissection on 10/20/17. Intermediate oncotype. Completed TC x 4. BRCA NEGATIVE. Consented for PALLAS and on arm B\par \par - Breast ca:  She is tolerating exemestane very well without significant side effects.  Reports good compliance. Plan to continue AI for total of 10 years due to node+ disease. She switched to Q3M lupron for convenience.  Estradiol suppressed and FSH is low 7/2021. Will repeat FSH 3/2023 Doesn't want BSO. Up to date with right  breast [id number]\par \par - Aromatase inhibitor induced arthralgia:  Rec stretching exercises, physical therapy and ortho f/u. Continue Gabapentin PRN\par - Hot flashes: 2/2 exemestane: Advised to wear layers and use fan prn. She could not tolerate effexor, but takes gabapentin prn.\par - Hair thinning- Likely 2/2 AI. Stable with Biotin\par - Osteoporosis: concern for worsening bone density due to exemestane. Rec to  continue calcium and vit D. DEXA 1-2 yrs.8/19/2022. Started prolia 7/2020  dental PRN Will monitor Ca levels prn. CMP today. continue prolia q6m\par - High cholesterol: concern for worsening cholesterol due to AI. Pt is on zocor. Lipid profile annually.She is noted to have elevated chol f/u with PCP.\par - Low Vitamin D: concern for worsening bone loss due to exemestane and low vit D. Discussed to increase Vit D intake. Check level next visit reminded to take Vit D suppls daily\par - Post mastectomy pain- resolved\par -Patient is in P[facility]AS clinical  trial f/u phase  \par 	\par -lupron q3m, prolia q6m\par RTC 3 months MD\par

## 2022-12-08 ENCOUNTER — NON-APPOINTMENT (OUTPATIENT)
Age: 52
End: 2022-12-08

## 2022-12-08 LAB
CHOLEST SERPL-MCNC: 258 MG/DL
HDLC SERPL-MCNC: 47 MG/DL
LDLC SERPL CALC-MCNC: 186 MG/DL
NONHDLC SERPL-MCNC: 210 MG/DL
TRIGL SERPL-MCNC: 120 MG/DL

## 2023-03-03 ENCOUNTER — OUTPATIENT (OUTPATIENT)
Dept: OUTPATIENT SERVICES | Facility: HOSPITAL | Age: 53
LOS: 1 days | Discharge: HOME | End: 2023-03-03

## 2023-03-03 DIAGNOSIS — Z90.12 ACQUIRED ABSENCE OF LEFT BREAST AND NIPPLE: Chronic | ICD-10-CM

## 2023-03-03 DIAGNOSIS — C50.912 MALIGNANT NEOPLASM OF UNSPECIFIED SITE OF LEFT FEMALE BREAST: ICD-10-CM

## 2023-03-10 ENCOUNTER — NON-APPOINTMENT (OUTPATIENT)
Age: 53
End: 2023-03-10

## 2023-03-10 ENCOUNTER — APPOINTMENT (OUTPATIENT)
Dept: HEMATOLOGY ONCOLOGY | Facility: CLINIC | Age: 53
End: 2023-03-10

## 2023-03-10 ENCOUNTER — APPOINTMENT (OUTPATIENT)
Dept: HEMATOLOGY ONCOLOGY | Facility: CLINIC | Age: 53
End: 2023-03-10
Payer: MEDICAID

## 2023-03-10 ENCOUNTER — APPOINTMENT (OUTPATIENT)
Dept: INFUSION THERAPY | Facility: HOSPITAL | Age: 53
End: 2023-03-10

## 2023-03-10 VITALS
TEMPERATURE: 96.8 F | DIASTOLIC BLOOD PRESSURE: 82 MMHG | BODY MASS INDEX: 27.04 KG/M2 | OXYGEN SATURATION: 100 % | WEIGHT: 147.71 LBS | SYSTOLIC BLOOD PRESSURE: 128 MMHG | HEART RATE: 63 BPM | RESPIRATION RATE: 16 BRPM

## 2023-03-10 DIAGNOSIS — Z79.818 LONG TERM (CURRENT) USE OF OTHER AGENTS AFFECTING ESTROGEN RECEPTORS AND ESTROGEN LEVELS: ICD-10-CM

## 2023-03-10 PROCEDURE — 99214 OFFICE O/P EST MOD 30 MIN: CPT

## 2023-03-11 NOTE — HISTORY OF PRESENT ILLNESS
[Date: ____________] : Patient's last distress assessment performed on [unfilled]. [6 - Distress Level] : Distress Level: 6 [Patient given social work contact information and resource sheet] : Patient was given social work contact information and resource sheet [IIB] : IIB [de-identified] : This is a very pleasant 48- year-old premenopausal lady who was diagnosed with breast cancer and is here for a consultation today. Her oncologic history is follows:\par \par She underwent routine breast imaging on 5/4/17 which showed a spiculated lesion measuring 2.2 x 2.9 cm in the upper outer left breast. She underwent left breast 2:00 lesion core biopsy on 6/9/17 which showed invasive well-differentiated ductal carcinoma, Ej score 5/9, measuring 1.5 cm, ER 95%, UT 10% HER-2/shaila negative. She underwent a breast MRI on 7/15/17 which showed additional concerning areas for which biopsy was recommended. She underwent left breast 3:00 MRI biopsy on 7/18/17 which showed invasive well-differentiated ductal carcinoma, measuring 0.6 cm, Ej score 5/9 ER more than 90%, UT more than 90% HER-2/shaila negative. \par \par She underwent left mastectomy and left sentinel axillary lymph node dissection on 9/28/17 which showed invasive moderately differentiated ductal carcinoma, [place] grade 2/3, measuring 4 cm, 0.3 cm from the margin. Lymphovascular invasion was noted. Low/intermediate grade DCIS was noted. One sentinel lymph node was positive for metastasis measuring 0.3 cm. 10 additional lymph nodes were negative.\par \par s/p TC x4 completed in January.2018\par superficial phlebitis in the right arm after cycle 4 chemo. No s/f thrombus felt but noted to have focal superficial thrombosis on sono.therefore started on OS+AI instead of tamoxifen [de-identified] : \par GENETICS: CANCERNEXT AMBRY PANEL NEGATIVE \par  [FreeTextEntry1] : lupron started 2/2018\par exemestane 3/2018 [de-identified] : Ms. OSKAR SHARMA  is here for a follow up for left breast cancer on Lupron since 2/2018, and Exemestane since 3/2018, on Q3M lupron\par \par 3/2023\par Takes exemestane daily, good compliance. Bone pain resolved. Bone scan 10/2020 [facility]. She was prescribed effexor and gabapentin for hot flashes but felt very tired and sleepy therefore was not using it. Hot flashes are still present but tolerable. Mild joint stiffness with prolonged inactivity.  She is active, no change in energy, wt or appetite.  No vag dryness, no excessive fatigue. Hair loss stable with biotin. \par LMP 11/15/17. not sexually active. On Lupron q3m, but last Eligard was 10/2021 due to insurance issues. .has not had no vag spotting/ bleeding. FSH 4-5 range. Doesn't want BSO. Check FSH/E next visit.. if FSH> 25, would stop lupron\par Mammo/sono right breast 8/2022 birads 2\par Dexa 6/2020 OSTEOPOROSIS, started prolia in july 2020. dexa 8/2022 improvement. T score -2.1. 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

## 2023-03-11 NOTE — REASON FOR VISIT
[Follow-Up Visit] : a follow-up [Other: _____] : [unfilled] [FreeTextEntry2] : breast cancer ER/NH +  HER-2 -

## 2023-03-11 NOTE — ASSESSMENT
[Curative] : Goals of care discussed with patient: Curative [FreeTextEntry1] : This is a 52-year-old very pleasant premenopausal lady, who is diagnosed with stage IIB (T2, N1, M0) ER/HI positive and HER-2/shaila negative left breast well to moderately differentiated invasive ductal carcinoma. She is status post left mastectomy and axillary lymph node dissection on 10/20/17. Intermediate oncotype. Completed TC x 4. BRCA NEGATIVE. Consented for PALLAS and on arm B\par \par - Breast ca:  She is tolerating exemestane very well without significant side effects.  Reports good compliance. Plan to continue AI for total of 10 years due to node+ disease. She switched to Q3M lupron for convenience.  Estradiol suppressed and FSH is low 7/2021. Will repeat FSH 3/2023 Doesn't want BSO. Up to date with right  breast [id number]/2022\par \par - Aromatase inhibitor induced arthralgia:  Rec stretching exercises, physical therapy and ortho f/u. Continue Gabapentin PRN\par - Hot flashes: 2/2 exemestane: Advised to wear layers and use fan prn. She could not tolerate effexor, but takes gabapentin prn.\par - Hair thinning- Likely 2/2 AI. Stable with Biotin\par - Osteoporosis: concern for worsening bone density due to exemestane. Rec to  continue calcium and vit D. DEXA 1-2 yrs.8/19/2022. Started prolia 7/2020  dental PRN Will monitor Ca levels prn. CMP today. continue prolia q6m\par - High cholesterol: concern for worsening cholesterol due to AI. Pt is on zocor. Lipid profile annually.She is noted to have elevated chol f/u with PCP.\par - Low Vitamin D: concern for worsening bone loss due to exemestane and low vit D. Discussed to increase Vit D intake. Check level next visit reminded to take Vit D suppls daily\par - Post mastectomy pain- resolved\par -Patient is in P[facility] clinical  trial f/u phase  \par 	\par -lupron q3m, prolia q6m\par RTC 3 months MD\par

## 2023-03-11 NOTE — PHYSICAL EXAM
Is This A New Presentation, Or A Follow-Up?: Wart How Severe Are Your Warts?: mild [Restricted in physically strenuous activity but ambulatory and able to carry out work of a light or sedentary nature] : Status 1- Restricted in physically strenuous activity but ambulatory and able to carry out work of a light or sedentary nature, e.g., light house work, office work [Normal] : affect appropriate [de-identified] : s/p left mastectomy with reconstruction, noted to have a palpable findings at 2:00 left reconstruction: stable since last visit. s/p right breast reduction mammoplasty for symmetry

## 2023-03-30 NOTE — PHYSICAL EXAM
[Restricted in physically strenuous activity but ambulatory and able to carry out work of a light or sedentary nature] : Status 1- Restricted in physically strenuous activity but ambulatory and able to carry out work of a light or sedentary nature, e.g., light house work, office work [Normal] : affect appropriate [de-identified] : s/p left mastectomy with reconstruction, noted to have a palpable findings at 2:00 left reconstruction: stable since last visit. s/p right breast reduction mammoplasty for symmetry Yes

## 2023-04-03 ENCOUNTER — RESULT REVIEW (OUTPATIENT)
Age: 53
End: 2023-04-03

## 2023-04-03 ENCOUNTER — APPOINTMENT (OUTPATIENT)
Dept: HEMATOLOGY ONCOLOGY | Facility: CLINIC | Age: 53
End: 2023-04-03

## 2023-04-03 LAB
BASOPHILS # BLD AUTO: 0.05 K/UL — SIGNIFICANT CHANGE UP (ref 0–0.2)
BASOPHILS NFR BLD AUTO: 0.5 % — SIGNIFICANT CHANGE UP (ref 0–2)
EOSINOPHIL # BLD AUTO: 0.29 K/UL — SIGNIFICANT CHANGE UP (ref 0–0.5)
EOSINOPHIL NFR BLD AUTO: 3.1 % — SIGNIFICANT CHANGE UP (ref 0–6)
HCT VFR BLD CALC: 37.6 % — SIGNIFICANT CHANGE UP (ref 34.5–45)
HGB BLD-MCNC: 12.6 G/DL — SIGNIFICANT CHANGE UP (ref 11.5–15.5)
IMM GRANULOCYTES NFR BLD AUTO: 0.4 % — SIGNIFICANT CHANGE UP (ref 0–0.9)
LYMPHOCYTES # BLD AUTO: 2.95 K/UL — SIGNIFICANT CHANGE UP (ref 1–3.3)
LYMPHOCYTES # BLD AUTO: 31.1 % — SIGNIFICANT CHANGE UP (ref 13–44)
MCHC RBC-ENTMCNC: 29.4 PG — SIGNIFICANT CHANGE UP (ref 27–34)
MCHC RBC-ENTMCNC: 33.5 G/DL — SIGNIFICANT CHANGE UP (ref 32–36)
MCV RBC AUTO: 87.9 FL — SIGNIFICANT CHANGE UP (ref 80–100)
MONOCYTES # BLD AUTO: 0.58 K/UL — SIGNIFICANT CHANGE UP (ref 0–0.9)
MONOCYTES NFR BLD AUTO: 6.1 % — SIGNIFICANT CHANGE UP (ref 2–14)
NEUTROPHILS # BLD AUTO: 5.57 K/UL — SIGNIFICANT CHANGE UP (ref 1.8–7.4)
NEUTROPHILS NFR BLD AUTO: 58.8 % — SIGNIFICANT CHANGE UP (ref 43–77)
NRBC # BLD: 0 /100 WBCS — SIGNIFICANT CHANGE UP (ref 0–0)
PLATELET # BLD AUTO: 239 K/UL — SIGNIFICANT CHANGE UP (ref 150–400)
RBC # BLD: 4.28 M/UL — SIGNIFICANT CHANGE UP (ref 3.8–5.2)
RBC # FLD: 13 % — SIGNIFICANT CHANGE UP (ref 10.3–14.5)
WBC # BLD: 9.48 K/UL — SIGNIFICANT CHANGE UP (ref 3.8–10.5)
WBC # FLD AUTO: 9.48 K/UL — SIGNIFICANT CHANGE UP (ref 3.8–10.5)

## 2023-04-06 LAB
ALBUMIN SERPL ELPH-MCNC: 4.4 G/DL
ALP BLD-CCNC: 52 U/L
ALT SERPL-CCNC: 37 U/L
ANION GAP SERPL CALC-SCNC: 12 MMOL/L
AST SERPL-CCNC: 28 U/L
BILIRUB SERPL-MCNC: 0.7 MG/DL
BUN SERPL-MCNC: 15 MG/DL
CALCIUM SERPL-MCNC: 10.2 MG/DL
CHLORIDE SERPL-SCNC: 101 MMOL/L
CO2 SERPL-SCNC: 29 MMOL/L
CREAT SERPL-MCNC: 0.82 MG/DL
EGFR: 85 ML/MIN/1.73M2
ESTRADIOL SERPL-MCNC: <5 PG/ML
FSH SERPL-MCNC: 4 IU/L
GLUCOSE SERPL-MCNC: 106 MG/DL
POTASSIUM SERPL-SCNC: 4.3 MMOL/L
PROT SERPL-MCNC: 7.3 G/DL
SODIUM SERPL-SCNC: 142 MMOL/L

## 2023-04-25 NOTE — PHYSICAL EXAM
Pt to nurses station, Mark Kelly made pt   aware that he was accepted at [facility] at this time and that police would be coming to escort patient. Pt responded by yelling, becoming verbally aggressive with staff, unable to calm down and return back to room. MD notified.  Orders received [Fully active, able to carry on all pre-disease performance without restriction] : Status 0 - Fully active, able to carry on all pre-disease performance without restriction [Normal] : affect appropriate [de-identified] : s/p left mastectomy with reconstruction. no CW or axillary nodule. No abnormalities in the right breast

## 2023-05-26 ENCOUNTER — OUTPATIENT (OUTPATIENT)
Dept: OUTPATIENT SERVICES | Facility: HOSPITAL | Age: 53
LOS: 1 days | Discharge: HOME | End: 2023-05-26

## 2023-05-26 DIAGNOSIS — C50.912 MALIGNANT NEOPLASM OF UNSPECIFIED SITE OF LEFT FEMALE BREAST: ICD-10-CM

## 2023-05-26 DIAGNOSIS — Z90.12 ACQUIRED ABSENCE OF LEFT BREAST AND NIPPLE: Chronic | ICD-10-CM

## 2023-06-09 ENCOUNTER — APPOINTMENT (OUTPATIENT)
Dept: INFUSION THERAPY | Facility: HOSPITAL | Age: 53
End: 2023-06-09

## 2023-06-09 ENCOUNTER — APPOINTMENT (OUTPATIENT)
Dept: HEMATOLOGY ONCOLOGY | Facility: CLINIC | Age: 53
End: 2023-06-09
Payer: MEDICAID

## 2023-06-09 VITALS
HEART RATE: 80 BPM | OXYGEN SATURATION: 98 % | BODY MASS INDEX: 27.04 KG/M2 | WEIGHT: 147.71 LBS | DIASTOLIC BLOOD PRESSURE: 91 MMHG | RESPIRATION RATE: 16 BRPM | TEMPERATURE: 97 F | SYSTOLIC BLOOD PRESSURE: 162 MMHG

## 2023-06-09 DIAGNOSIS — R79.89 OTHER SPECIFIED ABNORMAL FINDINGS OF BLOOD CHEMISTRY: ICD-10-CM

## 2023-06-09 DIAGNOSIS — C50.912 MALIGNANT NEOPLASM OF UNSPECIFIED SITE OF LEFT FEMALE BREAST: ICD-10-CM

## 2023-06-09 DIAGNOSIS — Z79.811 LONG TERM (CURRENT) USE OF AROMATASE INHIBITORS: ICD-10-CM

## 2023-06-09 DIAGNOSIS — M25.60 STIFFNESS OF UNSPECIFIED JOINT, NOT ELSEWHERE CLASSIFIED: ICD-10-CM

## 2023-06-09 PROCEDURE — 99214 OFFICE O/P EST MOD 30 MIN: CPT

## 2023-06-12 NOTE — REASON FOR VISIT
[Follow-Up Visit] : a follow-up [Other: _____] : [unfilled] [FreeTextEntry2] : breast cancer ER/GA +  HER-2 -

## 2023-06-12 NOTE — ASSESSMENT
[Curative] : Goals of care discussed with patient: Curative [FreeTextEntry1] : This is a 52-year-old very pleasant premenopausal lady, who is diagnosed with stage IIB (T2, N1, M0) ER/[place] positive and HER-2/shaila negative left breast well to moderately differentiated invasive ductal carcinoma. She is status post left mastectomy and axillary lymph node dissection on 10/20/17. Intermediate oncotype. Completed TC x 4. BRCA NEGATIVE. Consented for PALLAS and on arm B\par \par - Breast ca:  She is tolerating exemestane very well without significant side effects.  Reports good compliance. Plan to continue AI for total of 10 years due to node+ disease. She switched to Q3M lupron for convenience.  Estradiol suppressed and FSH is low 7/2021. Will repeat FSH 3/2023 Doesn't want BSO. Right  breast imaging Due 08/2023\par 6/9/2023\par She is here for Eligard and Prolia injection due to insurance issues had to switch to Lupron 22.5 mg q 3 months.  \par She is noted to have elevated B/P today. No Headaches or dizziness. She did not take B/P meds this am reminded to take ASAP and f/u with PCP.\par - Aromatase inhibitor induced arthralgia:  Rec stretching exercises, physical therapy and ortho f/u. Continue Gabapentin PRN\par - Hot flashes: 2/2 exemestane: Advised to wear layers and use fan prn. She could not tolerate effexor, but takes gabapentin prn.\par - Hair thinning- Likely 2/2 AI. Stable with Biotin\par - Osteoporosis: concern for worsening bone density due to exemestane. Rec to  continue calcium and vit D. DEXA 1-2 yrs.8/19/2022. Started prolia 7/2020  dental PRN Will monitor Ca levels prn. Prolia injection to be done via home care as per insurance.\par - High cholesterol: concern for worsening cholesterol due to AI. Pt is on zocor. Lipid profile annually.She is noted to have elevated chol f/u with PCP.\par - Low Vitamin D: concern for worsening bone loss due to exemestane and low vit D. Discussed to increase Vit D intake. Check level next visit reminded to take Vit D suppls daily\par - Post mastectomy pain- resolved\par -Patient is in PALLAS clinical  trial f/u phase  \par 	\par -lupron q3m in tx room, prolia q6m via home care\par RTC 3 months MD\par

## 2023-06-12 NOTE — PHYSICAL EXAM
[Restricted in physically strenuous activity but ambulatory and able to carry out work of a light or sedentary nature] : Status 1- Restricted in physically strenuous activity but ambulatory and able to carry out work of a light or sedentary nature, e.g., light house work, office work [Normal] : no JVD, no calf tenderness, venous stasis changes, varices [de-identified] : s/p left mastectomy with reconstruction, noted to have a palpable findings at 2:00 left reconstruction: stable since last visit. s/p right breast reduction mammoplasty for symmetry

## 2023-06-12 NOTE — HISTORY OF PRESENT ILLNESS
[Date: ____________] : Patient's last distress assessment performed on [unfilled]. [6 - Distress Level] : Distress Level: 6 [Patient given social work contact information and resource sheet] : Patient was given social work contact information and resource sheet [IIB] : IIB [de-identified] : This is a very pleasant 48- year-old premenopausal lady who was diagnosed with breast cancer and is here for a consultation today. Her oncologic history is follows:\par \par She underwent routine breast imaging on 5/4/17 which showed a spiculated lesion measuring 2.2 x 2.9 cm in the upper outer left breast. She underwent left breast 2:00 lesion core biopsy on 6/9/17 which showed invasive well-differentiated ductal carcinoma, Ej score 5/9, measuring 1.5 cm, ER 95%, MA 10% HER-2/shaila negative. She underwent a breast MRI on 7/15/17 which showed additional concerning areas for which biopsy was recommended. She underwent left breast 3:00 MRI biopsy on 7/18/17 which showed invasive well-differentiated ductal carcinoma, measuring 0.6 cm, Ej score 5/9 ER more than 90%, MA more than 90% HER-2/shaila negative. \par \par She underwent left mastectomy and left sentinel axillary lymph node dissection on 9/28/17 which showed invasive moderately differentiated ductal carcinoma, [place] grade 2/3, measuring 4 cm, 0.3 cm from the margin. Lymphovascular invasion was noted. Low/intermediate grade DCIS was noted. One sentinel lymph node was positive for metastasis measuring 0.3 cm. 10 additional lymph nodes were negative.\par \par s/p TC x4 completed in January.2018\par superficial phlebitis in the right arm after cycle 4 chemo. No s/f thrombus felt but noted to have focal superficial thrombosis on sono.therefore started on OS+AI instead of tamoxifen [de-identified] : \par GENETICS: CANCERNEXT AMBRY PANEL NEGATIVE \par  [FreeTextEntry1] : lupron started 2/2018\par exemestane 3/2018 [de-identified] : Ms. OSKAR SHARMA  is here for a follow up for left breast cancer on Lupron since 2/2018, and Exemestane since 3/2018, on Q3M lupron\par \par 6/9/2023\par She is here for Eligard and Prolia injectio due to insurance issues had to switch to Lupron 22.5 mg q 3 months. Prolia injection to be done via home care as per insurance. Her 79 yo father passed away 5/2023\par She is noted to have elevated B/P today. No Headaches or dizziness. She did not take B/P meds this am reminded to take ASAP and f/u with PCP.\par Takes exemestane daily, good compliance. Bone pain resolved. Bone scan 10/2020 [facility]. She was prescribed effexor and gabapentin for hot flashes but felt very tired and sleepy therefore was not using it. Hot flashes are still present but tolerable. Mild joint stiffness with prolonged inactivity.  She is active, no change in energy, wt or appetite.  No vag dryness, no excessive fatigue. Hair loss stable with biotin. \par LMP 11/15/17. not sexually active. On Lupron q3m, but last Eligard was 10/2021 due to insurance issues. .has not had no vag spotting/ bleeding. FSH 4-5 range. Doesn't want BSO. Check FSH/E next visit.. if FSH> 25, would stop lupron\par Mammo/sono right breast 8/2022 birads 2\par Dexa 6/2020 OSTEOPOROSIS, started prolia in july 2020. dexa 8/2022 improvement. T score -2.1. 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

## 2023-09-08 ENCOUNTER — OUTPATIENT (OUTPATIENT)
Dept: OUTPATIENT SERVICES | Facility: HOSPITAL | Age: 53
LOS: 1 days | Discharge: HOME | End: 2023-09-08

## 2023-09-08 DIAGNOSIS — C50.912 MALIGNANT NEOPLASM OF UNSPECIFIED SITE OF LEFT FEMALE BREAST: ICD-10-CM

## 2023-09-08 DIAGNOSIS — Z90.12 ACQUIRED ABSENCE OF LEFT BREAST AND NIPPLE: Chronic | ICD-10-CM

## 2023-09-11 ENCOUNTER — APPOINTMENT (OUTPATIENT)
Dept: INFUSION THERAPY | Facility: HOSPITAL | Age: 53
End: 2023-09-11

## 2023-09-11 ENCOUNTER — NON-APPOINTMENT (OUTPATIENT)
Age: 53
End: 2023-09-11

## 2023-09-11 ENCOUNTER — RESULT REVIEW (OUTPATIENT)
Age: 53
End: 2023-09-11

## 2023-09-11 ENCOUNTER — APPOINTMENT (OUTPATIENT)
Dept: HEMATOLOGY ONCOLOGY | Facility: CLINIC | Age: 53
End: 2023-09-11
Payer: MEDICAID

## 2023-09-11 VITALS
RESPIRATION RATE: 15 BRPM | OXYGEN SATURATION: 99 % | BODY MASS INDEX: 26.24 KG/M2 | WEIGHT: 143.3 LBS | SYSTOLIC BLOOD PRESSURE: 166 MMHG | DIASTOLIC BLOOD PRESSURE: 94 MMHG | HEART RATE: 54 BPM | TEMPERATURE: 97 F

## 2023-09-11 DIAGNOSIS — R23.2 FLUSHING: ICD-10-CM

## 2023-09-11 DIAGNOSIS — M81.0 AGE-RELATED OSTEOPOROSIS W/OUT CURRENT PATHOLOGICAL FRACTURE: ICD-10-CM

## 2023-09-11 DIAGNOSIS — M89.8X9 OTHER SPECIFIED DISORDERS OF BONE, UNSPECIFIED SITE: ICD-10-CM

## 2023-09-11 LAB
BASOPHILS # BLD AUTO: 0.06 K/UL — SIGNIFICANT CHANGE UP (ref 0–0.2)
BASOPHILS NFR BLD AUTO: 0.6 % — SIGNIFICANT CHANGE UP (ref 0–2)
EOSINOPHIL # BLD AUTO: 0.24 K/UL — SIGNIFICANT CHANGE UP (ref 0–0.5)
EOSINOPHIL NFR BLD AUTO: 2.4 % — SIGNIFICANT CHANGE UP (ref 0–6)
HCT VFR BLD CALC: 42.9 % — SIGNIFICANT CHANGE UP (ref 34.5–45)
HGB BLD-MCNC: 14.4 G/DL — SIGNIFICANT CHANGE UP (ref 11.5–15.5)
IMM GRANULOCYTES NFR BLD AUTO: 0.5 % — SIGNIFICANT CHANGE UP (ref 0–0.9)
LYMPHOCYTES # BLD AUTO: 2.15 K/UL — SIGNIFICANT CHANGE UP (ref 1–3.3)
LYMPHOCYTES # BLD AUTO: 21.6 % — SIGNIFICANT CHANGE UP (ref 13–44)
MCHC RBC-ENTMCNC: 29 PG — SIGNIFICANT CHANGE UP (ref 27–34)
MCHC RBC-ENTMCNC: 33.6 G/DL — SIGNIFICANT CHANGE UP (ref 32–36)
MCV RBC AUTO: 86.5 FL — SIGNIFICANT CHANGE UP (ref 80–100)
MONOCYTES # BLD AUTO: 0.42 K/UL — SIGNIFICANT CHANGE UP (ref 0–0.9)
MONOCYTES NFR BLD AUTO: 4.2 % — SIGNIFICANT CHANGE UP (ref 2–14)
NEUTROPHILS # BLD AUTO: 7.02 K/UL — SIGNIFICANT CHANGE UP (ref 1.8–7.4)
NEUTROPHILS NFR BLD AUTO: 70.7 % — SIGNIFICANT CHANGE UP (ref 43–77)
NRBC # BLD: 0 /100 WBCS — SIGNIFICANT CHANGE UP (ref 0–0)
PLATELET # BLD AUTO: 238 K/UL — SIGNIFICANT CHANGE UP (ref 150–400)
RBC # BLD: 4.96 M/UL — SIGNIFICANT CHANGE UP (ref 3.8–5.2)
RBC # FLD: 12.4 % — SIGNIFICANT CHANGE UP (ref 10.3–14.5)
WBC # BLD: 9.94 K/UL — SIGNIFICANT CHANGE UP (ref 3.8–10.5)
WBC # FLD AUTO: 9.94 K/UL — SIGNIFICANT CHANGE UP (ref 3.8–10.5)

## 2023-09-11 PROCEDURE — 99215 OFFICE O/P EST HI 40 MIN: CPT

## 2023-09-14 LAB
ALBUMIN SERPL ELPH-MCNC: 4.7 G/DL
ALP BLD-CCNC: 78 U/L
ALT SERPL-CCNC: 26 U/L
ANION GAP SERPL CALC-SCNC: 11 MMOL/L
AST SERPL-CCNC: 20 U/L
BILIRUB SERPL-MCNC: 0.5 MG/DL
BUN SERPL-MCNC: 13 MG/DL
CALCIUM SERPL-MCNC: 10.2 MG/DL
CANCER AG27-29 SERPL-ACNC: 23.3 U/ML
CEA SERPL-MCNC: 2.8 NG/ML
CHLORIDE SERPL-SCNC: 101 MMOL/L
CO2 SERPL-SCNC: 26 MMOL/L
CREAT SERPL-MCNC: 0.73 MG/DL
EGFR: 97 ML/MIN/1.73M2
ESTRADIOL SERPL-MCNC: 9 PG/ML
FSH SERPL-MCNC: 4 IU/L
GLUCOSE SERPL-MCNC: 113 MG/DL
POTASSIUM SERPL-SCNC: 4.9 MMOL/L
PROT SERPL-MCNC: 7.8 G/DL
SODIUM SERPL-SCNC: 139 MMOL/L

## 2023-09-15 RX ORDER — LEUPROLIDE ACETATE 11.25 MG
11.25 KIT INTRAMUSCULAR
Refills: 0 | Status: DISCONTINUED | COMMUNITY
End: 2023-09-15

## 2023-09-15 RX ORDER — LEUPROLIDE ACETATE 22.5 MG/.375ML
22.5 INJECTION, SUSPENSION, EXTENDED RELEASE SUBCUTANEOUS
Qty: 1 | Refills: 4 | Status: DISCONTINUED | COMMUNITY
Start: 2022-06-02 | End: 2023-09-15

## 2023-09-15 RX ORDER — LEUPROLIDE ACETATE 22.5 MG
22.5 KIT INTRAMUSCULAR
Qty: 1 | Refills: 1 | Status: DISCONTINUED | COMMUNITY
Start: 2023-04-14 | End: 2023-09-15

## 2023-09-18 ENCOUNTER — APPOINTMENT (OUTPATIENT)
Dept: ULTRASOUND IMAGING | Facility: IMAGING CENTER | Age: 53
End: 2023-09-18
Payer: MEDICAID

## 2023-09-18 ENCOUNTER — RESULT REVIEW (OUTPATIENT)
Age: 53
End: 2023-09-18

## 2023-09-18 ENCOUNTER — APPOINTMENT (OUTPATIENT)
Dept: MAMMOGRAPHY | Facility: IMAGING CENTER | Age: 53
End: 2023-09-18
Payer: MEDICAID

## 2023-09-18 ENCOUNTER — OUTPATIENT (OUTPATIENT)
Dept: OUTPATIENT SERVICES | Facility: HOSPITAL | Age: 53
LOS: 1 days | End: 2023-09-18
Payer: MEDICAID

## 2023-09-18 DIAGNOSIS — C50.912 MALIGNANT NEOPLASM OF UNSPECIFIED SITE OF LEFT FEMALE BREAST: ICD-10-CM

## 2023-09-18 DIAGNOSIS — Z90.12 ACQUIRED ABSENCE OF LEFT BREAST AND NIPPLE: Chronic | ICD-10-CM

## 2023-09-18 PROCEDURE — 76641 ULTRASOUND BREAST COMPLETE: CPT | Mod: 26,50

## 2023-09-18 PROCEDURE — 76641 ULTRASOUND BREAST COMPLETE: CPT

## 2023-09-18 PROCEDURE — 77063 BREAST TOMOSYNTHESIS BI: CPT

## 2023-09-18 PROCEDURE — 77067 SCR MAMMO BI INCL CAD: CPT | Mod: 26,52

## 2023-09-18 PROCEDURE — 77063 BREAST TOMOSYNTHESIS BI: CPT | Mod: 26,52

## 2023-09-18 PROCEDURE — 77067 SCR MAMMO BI INCL CAD: CPT

## 2023-11-24 ENCOUNTER — OUTPATIENT (OUTPATIENT)
Dept: OUTPATIENT SERVICES | Facility: HOSPITAL | Age: 53
LOS: 1 days | Discharge: HOME | End: 2023-11-24

## 2023-11-24 DIAGNOSIS — C50.912 MALIGNANT NEOPLASM OF UNSPECIFIED SITE OF LEFT FEMALE BREAST: ICD-10-CM

## 2023-11-24 DIAGNOSIS — Z90.12 ACQUIRED ABSENCE OF LEFT BREAST AND NIPPLE: Chronic | ICD-10-CM

## 2023-12-11 ENCOUNTER — APPOINTMENT (OUTPATIENT)
Dept: INFUSION THERAPY | Facility: HOSPITAL | Age: 53
End: 2023-12-11

## 2024-03-05 ENCOUNTER — OUTPATIENT (OUTPATIENT)
Dept: OUTPATIENT SERVICES | Facility: HOSPITAL | Age: 54
LOS: 1 days | Discharge: HOME | End: 2024-03-05

## 2024-03-05 DIAGNOSIS — C50.912 MALIGNANT NEOPLASM OF UNSPECIFIED SITE OF LEFT FEMALE BREAST: ICD-10-CM

## 2024-03-05 DIAGNOSIS — Z90.12 ACQUIRED ABSENCE OF LEFT BREAST AND NIPPLE: Chronic | ICD-10-CM

## 2024-03-11 ENCOUNTER — RESULT REVIEW (OUTPATIENT)
Age: 54
End: 2024-03-11

## 2024-03-11 ENCOUNTER — APPOINTMENT (OUTPATIENT)
Dept: INFUSION THERAPY | Facility: HOSPITAL | Age: 54
End: 2024-03-11

## 2024-03-11 ENCOUNTER — APPOINTMENT (OUTPATIENT)
Dept: HEMATOLOGY ONCOLOGY | Facility: CLINIC | Age: 54
End: 2024-03-11
Payer: COMMERCIAL

## 2024-03-11 VITALS
SYSTOLIC BLOOD PRESSURE: 147 MMHG | HEART RATE: 54 BPM | TEMPERATURE: 97.8 F | OXYGEN SATURATION: 97 % | WEIGHT: 146.5 LBS | BODY MASS INDEX: 26.82 KG/M2 | DIASTOLIC BLOOD PRESSURE: 88 MMHG | RESPIRATION RATE: 15 BRPM

## 2024-03-11 DIAGNOSIS — E78.5 HYPERLIPIDEMIA, UNSPECIFIED: ICD-10-CM

## 2024-03-11 DIAGNOSIS — M85.80 OTHER SPECIFIED DISORDERS OF BONE DENSITY AND STRUCTURE, UNSPECIFIED SITE: ICD-10-CM

## 2024-03-11 DIAGNOSIS — C50.919 MALIGNANT NEOPLASM OF UNSPECIFIED SITE OF UNSPECIFIED FEMALE BREAST: ICD-10-CM

## 2024-03-11 LAB
BASOPHILS # BLD AUTO: 0.06 K/UL — SIGNIFICANT CHANGE UP (ref 0–0.2)
BASOPHILS NFR BLD AUTO: 0.7 % — SIGNIFICANT CHANGE UP (ref 0–2)
EOSINOPHIL # BLD AUTO: 0.26 K/UL — SIGNIFICANT CHANGE UP (ref 0–0.5)
EOSINOPHIL NFR BLD AUTO: 2.9 % — SIGNIFICANT CHANGE UP (ref 0–6)
HCT VFR BLD CALC: 39.9 % — SIGNIFICANT CHANGE UP (ref 34.5–45)
HGB BLD-MCNC: 13.2 G/DL — SIGNIFICANT CHANGE UP (ref 11.5–15.5)
IMM GRANULOCYTES NFR BLD AUTO: 0.2 % — SIGNIFICANT CHANGE UP (ref 0–0.9)
LYMPHOCYTES # BLD AUTO: 2.55 K/UL — SIGNIFICANT CHANGE UP (ref 1–3.3)
LYMPHOCYTES # BLD AUTO: 28.8 % — SIGNIFICANT CHANGE UP (ref 13–44)
MCHC RBC-ENTMCNC: 28.8 PG — SIGNIFICANT CHANGE UP (ref 27–34)
MCHC RBC-ENTMCNC: 33.1 G/DL — SIGNIFICANT CHANGE UP (ref 32–36)
MCV RBC AUTO: 87.1 FL — SIGNIFICANT CHANGE UP (ref 80–100)
MONOCYTES # BLD AUTO: 0.45 K/UL — SIGNIFICANT CHANGE UP (ref 0–0.9)
MONOCYTES NFR BLD AUTO: 5.1 % — SIGNIFICANT CHANGE UP (ref 2–14)
NEUTROPHILS # BLD AUTO: 5.51 K/UL — SIGNIFICANT CHANGE UP (ref 1.8–7.4)
NEUTROPHILS NFR BLD AUTO: 62.3 % — SIGNIFICANT CHANGE UP (ref 43–77)
NRBC # BLD: 0 /100 WBCS — SIGNIFICANT CHANGE UP (ref 0–0)
PLATELET # BLD AUTO: 243 K/UL — SIGNIFICANT CHANGE UP (ref 150–400)
RBC # BLD: 4.58 M/UL — SIGNIFICANT CHANGE UP (ref 3.8–5.2)
RBC # FLD: 12.6 % — SIGNIFICANT CHANGE UP (ref 10.3–14.5)
WBC # BLD: 8.85 K/UL — SIGNIFICANT CHANGE UP (ref 3.8–10.5)
WBC # FLD AUTO: 8.85 K/UL — SIGNIFICANT CHANGE UP (ref 3.8–10.5)

## 2024-03-11 PROCEDURE — 99214 OFFICE O/P EST MOD 30 MIN: CPT

## 2024-03-11 PROCEDURE — G2211 COMPLEX E/M VISIT ADD ON: CPT

## 2024-03-11 RX ORDER — EXEMESTANE 25 MG/1
25 TABLET, FILM COATED ORAL
Qty: 90 | Refills: 1 | Status: ACTIVE | COMMUNITY
Start: 2018-03-27 | End: 1900-01-01

## 2024-03-11 RX ORDER — GABAPENTIN 300 MG/1
300 CAPSULE ORAL
Qty: 30 | Refills: 5 | Status: DISCONTINUED | COMMUNITY
Start: 2020-01-27 | End: 2024-03-11

## 2024-03-11 RX ORDER — DENOSUMAB 60 MG/ML
60 INJECTION SUBCUTANEOUS
Qty: 1 | Refills: 0 | Status: DISCONTINUED | COMMUNITY
Start: 2022-06-02 | End: 2024-03-11

## 2024-03-11 NOTE — PHYSICAL EXAM
[Restricted in physically strenuous activity but ambulatory and able to carry out work of a light or sedentary nature] : Status 1- Restricted in physically strenuous activity but ambulatory and able to carry out work of a light or sedentary nature, e.g., light house work, office work [Normal] : grossly intact [de-identified] : s/p left mastectomy with reconstruction, noted to have a palpable findings at 2:00 left reconstruction: stable since last visit. s/p right breast reduction mammoplasty for symmetry

## 2024-03-11 NOTE — HISTORY OF PRESENT ILLNESS
[de-identified] : This is a very pleasant 48- year-old premenopausal lady who was diagnosed with breast cancer and is here for a consultation today. Her oncologic history is follows:  She underwent routine breast imaging on 5/4/17 which showed a spiculated lesion measuring 2.2 x 2.9 cm in the upper outer left breast. She underwent left breast 2:00 lesion core biopsy on 6/9/17 which showed invasive well-differentiated ductal carcinoma, [place] score 5/9, measuring 1.5 cm, ER 95%, OK 10% HER-2/shaila negative. She underwent a breast MRI on 7/15/17 which showed additional concerning areas for which biopsy was recommended. She underwent left breast 3:00 MRI biopsy on 7/18/17 which showed invasive well-differentiated ductal carcinoma, measuring 0.6 cm, Ej score 5/9 ER more than 90%, OK more than 90% HER-2/shaila negative.   She underwent left mastectomy and left sentinel axillary lymph node dissection on 9/28/17 which showed invasive moderately differentiated ductal carcinoma, [place] grade 2/3, measuring 4 cm, 0.3 cm from the margin. Lymphovascular invasion was noted. Low/intermediate grade DCIS was noted. One sentinel lymph node was positive for metastasis measuring 0.3 cm. 10 additional lymph nodes were negative.  s/p TC x4 completed in January.2018 superficial phlebitis in the right arm after cycle 4 chemo. No s/f thrombus felt but noted to have focal superficial thrombosis on sono.therefore started on OS+AI instead of tamoxifen [de-identified] : \par  GENETICS: CANCERNEXT AMBRY PANEL NEGATIVE \par   [FreeTextEntry1] : lupron started 2/2018\par  exemestane 3/2018 [de-identified] : Ms. OSKAR SHARMA  is here for a follow up for left breast cancer on Lupron since 2/2018, and Exemestane since 3/2018, on Q3M lupron  9/11/2023 Takes exemestane daily, good compliance.  She was prescribed effexor and gabapentin for hot flashes but felt very tired and sleepy therefore was not using it. Hot flashes are still present but tolerable. Mild joint stiffness with prolonged inactivity.  She is active, no change in energy, wt or appetite.  No vag dryness, no excessive fatigue. Hair loss stable with biotin.  LMP 11/15/17. not sexually active. On Lupron q3m, FSH 4-5 range. Doesn't want BSO.. FSH still low 9/2023 ( ? if 2/2 lupron feedback inhibition). She has been on lupron for 6 yrs. Discussed to stop lupron and monitor for vag bleeding. Continue AI . If periods resume, would restart Lupron. Not a candidate for tamoxifen due to h/o DVT Mammo/sono right breast 9/2022 birads 2 Dexa 6/2020 OSTEOPOROSIS, started prolia in july 2020. dexa 8/2022 improvement. T score -2.1. Continue prolia but insurance company is not covering it anymore. Will give a treatment break. repeat DEXA 8/2024. If worsening- will restart  ct a/p 8/2020: Stable renal artery aneurysm  5 yrs out, research BW will be done  Bone scan 10/2020 [facility]. She is on eligard every 3 m. No vag bleeding  [Date: ____________] : Patient's last distress assessment performed on [unfilled]. [6 - Distress Level] : Distress Level: 6 [Patient given social work contact information and resource sheet] : Patient was given social work contact information and resource sheet [IIB] : IIB

## 2024-03-11 NOTE — ASSESSMENT
[FreeTextEntry1] : This is a 52-year-old very pleasant premenopausal lady, who is diagnosed with stage IIB (T2, N1, M0) ER/MT positive and HER-2/shaila negative left breast well to moderately differentiated invasive ductal carcinoma. She is status post left mastectomy and axillary lymph node dissection on 10/20/17. Intermediate oncotype. Completed TC x 4. BRCA NEGATIVE. Consented for PALLAS and on arm B  - Breast ca:  She is tolerating exemestane very well without significant side effects.  Reports good compliance. Plan to continue AI for total of 10 years due to node+ disease. She switched to Q3M lupron for convenience.  Estradiol suppressed and FSH is low 7/2021. 3/2024: On Lupron q3m, FSH 4-5 range. Doesn't want BSO.. FSH still low 9/2023 ( ? if 2/2 lupron feedback inhibition). She has been on lupron for 6 yrs. Discussed to stop lupron and monitor for vag bleeding. Continue AI . If periods resume, would restart Lupron. Not a candidate for tamoxifen due to h/o DVT Right  breast imaging Due 9/2024 - Aromatase inhibitor induced arthralgia:  Rec stretching exercises, physical therapy and ortho f/u.  - Hot flashes: 2/2 exemestane: Advised to wear layers and use fan prn. She could not tolerate effexor, but takes gabapentin prn. - Hair thinning- Likely 2/2 AI. Stable with Biotin - Osteoporosis: concern for worsening bone density due to exemestane. Dexa 6/2020 OSTEOPOROSIS, started prolia in july 2020. dexa 8/2022 improvement. T score -2.1. Continue prolia but insurance company is not covering it anymore. Will give a treatment break. repeat DEXA 8/2024. If worsening- will restart  - High cholesterol: concern for worsening cholesterol due to AI. Pt is on zocor. Lipid profile annually.She is noted to have elevated chol f/u with PCP. - Low Vitamin D: concern for worsening bone loss due to exemestane and low vit D. Discussed to increase Vit D intake. Check level next visit reminded to take Vit D suppls daily - Post mastectomy pain- resolved -Patient is in [facility] clinical  trial f/u phase   	 RTC 6 months MD lupron today and then stop [Curative] : Goals of care discussed with patient: Curative

## 2024-03-12 LAB
ALBUMIN SERPL ELPH-MCNC: 4.5 G/DL
ALP BLD-CCNC: 110 U/L
ALT SERPL-CCNC: 26 U/L
ANION GAP SERPL CALC-SCNC: 11 MMOL/L
AST SERPL-CCNC: 19 U/L
BILIRUB SERPL-MCNC: 0.8 MG/DL
BUN SERPL-MCNC: 15 MG/DL
CALCIUM SERPL-MCNC: 10.1 MG/DL
CANCER AG27-29 SERPL-ACNC: 25.4 U/ML
CEA SERPL-MCNC: 2.5 NG/ML
CHLORIDE SERPL-SCNC: 103 MMOL/L
CO2 SERPL-SCNC: 27 MMOL/L
CREAT SERPL-MCNC: 0.73 MG/DL
EGFR: 97 ML/MIN/1.73M2
ESTRADIOL SERPL-MCNC: 13 PG/ML
FSH SERPL-MCNC: 4 IU/L
GLUCOSE SERPL-MCNC: 121 MG/DL
POTASSIUM SERPL-SCNC: 4.5 MMOL/L
PROT SERPL-MCNC: 7.5 G/DL
SODIUM SERPL-SCNC: 142 MMOL/L

## 2024-05-28 ENCOUNTER — NON-APPOINTMENT (OUTPATIENT)
Age: 54
End: 2024-05-28

## 2024-08-09 ENCOUNTER — APPOINTMENT (OUTPATIENT)
Dept: RADIOLOGY | Facility: IMAGING CENTER | Age: 54
End: 2024-08-09

## 2024-08-09 ENCOUNTER — OUTPATIENT (OUTPATIENT)
Dept: OUTPATIENT SERVICES | Facility: HOSPITAL | Age: 54
LOS: 1 days | End: 2024-08-09
Payer: MEDICAID

## 2024-08-09 DIAGNOSIS — Z90.12 ACQUIRED ABSENCE OF LEFT BREAST AND NIPPLE: Chronic | ICD-10-CM

## 2024-08-09 DIAGNOSIS — C50.919 MALIGNANT NEOPLASM OF UNSPECIFIED SITE OF UNSPECIFIED FEMALE BREAST: ICD-10-CM

## 2024-08-09 PROCEDURE — 77085 DXA BONE DENSITY AXL VRT FX: CPT

## 2024-08-09 PROCEDURE — 77085 DXA BONE DENSITY AXL VRT FX: CPT | Mod: 26

## 2024-08-13 ENCOUNTER — NON-APPOINTMENT (OUTPATIENT)
Age: 54
End: 2024-08-13

## 2024-08-25 ENCOUNTER — OUTPATIENT (OUTPATIENT)
Dept: OUTPATIENT SERVICES | Facility: HOSPITAL | Age: 54
LOS: 1 days | Discharge: HOME | End: 2024-08-25

## 2024-08-25 DIAGNOSIS — Z90.12 ACQUIRED ABSENCE OF LEFT BREAST AND NIPPLE: Chronic | ICD-10-CM

## 2024-08-25 DIAGNOSIS — C50.912 MALIGNANT NEOPLASM OF UNSPECIFIED SITE OF LEFT FEMALE BREAST: ICD-10-CM

## 2024-09-08 ENCOUNTER — NON-APPOINTMENT (OUTPATIENT)
Age: 54
End: 2024-09-08

## 2024-09-09 ENCOUNTER — OUTPATIENT (OUTPATIENT)
Dept: OUTPATIENT SERVICES | Facility: HOSPITAL | Age: 54
LOS: 1 days | End: 2024-09-09
Payer: MEDICAID

## 2024-09-09 ENCOUNTER — RESULT REVIEW (OUTPATIENT)
Age: 54
End: 2024-09-09

## 2024-09-09 ENCOUNTER — APPOINTMENT (OUTPATIENT)
Dept: HEMATOLOGY ONCOLOGY | Facility: CLINIC | Age: 54
End: 2024-09-09
Payer: COMMERCIAL

## 2024-09-09 ENCOUNTER — APPOINTMENT (OUTPATIENT)
Dept: ULTRASOUND IMAGING | Facility: IMAGING CENTER | Age: 54
End: 2024-09-09
Payer: COMMERCIAL

## 2024-09-09 ENCOUNTER — APPOINTMENT (OUTPATIENT)
Dept: MAMMOGRAPHY | Facility: IMAGING CENTER | Age: 54
End: 2024-09-09
Payer: COMMERCIAL

## 2024-09-09 VITALS
OXYGEN SATURATION: 99 % | SYSTOLIC BLOOD PRESSURE: 176 MMHG | RESPIRATION RATE: 16 BRPM | TEMPERATURE: 99.6 F | DIASTOLIC BLOOD PRESSURE: 101 MMHG | BODY MASS INDEX: 26.24 KG/M2 | WEIGHT: 143.3 LBS | HEART RATE: 63 BPM

## 2024-09-09 DIAGNOSIS — Z79.811 LONG TERM (CURRENT) USE OF AROMATASE INHIBITORS: ICD-10-CM

## 2024-09-09 DIAGNOSIS — M85.80 OTHER SPECIFIED DISORDERS OF BONE DENSITY AND STRUCTURE, UNSPECIFIED SITE: ICD-10-CM

## 2024-09-09 DIAGNOSIS — E55.9 VITAMIN D DEFICIENCY, UNSPECIFIED: ICD-10-CM

## 2024-09-09 DIAGNOSIS — C50.912 MALIGNANT NEOPLASM OF UNSPECIFIED SITE OF LEFT FEMALE BREAST: ICD-10-CM

## 2024-09-09 DIAGNOSIS — Z00.8 ENCOUNTER FOR OTHER GENERAL EXAMINATION: ICD-10-CM

## 2024-09-09 LAB
BASOPHILS # BLD AUTO: 0.04 K/UL — SIGNIFICANT CHANGE UP (ref 0–0.2)
BASOPHILS NFR BLD AUTO: 0.5 % — SIGNIFICANT CHANGE UP (ref 0–2)
EOSINOPHIL # BLD AUTO: 0.14 K/UL — SIGNIFICANT CHANGE UP (ref 0–0.5)
EOSINOPHIL NFR BLD AUTO: 1.9 % — SIGNIFICANT CHANGE UP (ref 0–6)
HCT VFR BLD CALC: 40.5 % — SIGNIFICANT CHANGE UP (ref 34.5–45)
HGB BLD-MCNC: 13.6 G/DL — SIGNIFICANT CHANGE UP (ref 11.5–15.5)
IMM GRANULOCYTES NFR BLD AUTO: 0.3 % — SIGNIFICANT CHANGE UP (ref 0–0.9)
LYMPHOCYTES # BLD AUTO: 2.55 K/UL — SIGNIFICANT CHANGE UP (ref 1–3.3)
LYMPHOCYTES # BLD AUTO: 34.3 % — SIGNIFICANT CHANGE UP (ref 13–44)
MCHC RBC-ENTMCNC: 29.4 PG — SIGNIFICANT CHANGE UP (ref 27–34)
MCHC RBC-ENTMCNC: 33.6 G/DL — SIGNIFICANT CHANGE UP (ref 32–36)
MCV RBC AUTO: 87.7 FL — SIGNIFICANT CHANGE UP (ref 80–100)
MONOCYTES # BLD AUTO: 0.37 K/UL — SIGNIFICANT CHANGE UP (ref 0–0.9)
MONOCYTES NFR BLD AUTO: 5 % — SIGNIFICANT CHANGE UP (ref 2–14)
NEUTROPHILS # BLD AUTO: 4.31 K/UL — SIGNIFICANT CHANGE UP (ref 1.8–7.4)
NEUTROPHILS NFR BLD AUTO: 58 % — SIGNIFICANT CHANGE UP (ref 43–77)
NRBC # BLD: 0 /100 WBCS — SIGNIFICANT CHANGE UP (ref 0–0)
PLATELET # BLD AUTO: 247 K/UL — SIGNIFICANT CHANGE UP (ref 150–400)
RBC # BLD: 4.62 M/UL — SIGNIFICANT CHANGE UP (ref 3.8–5.2)
RBC # FLD: 13.2 % — SIGNIFICANT CHANGE UP (ref 10.3–14.5)
WBC # BLD: 7.43 K/UL — SIGNIFICANT CHANGE UP (ref 3.8–10.5)
WBC # FLD AUTO: 7.43 K/UL — SIGNIFICANT CHANGE UP (ref 3.8–10.5)

## 2024-09-09 PROCEDURE — 77067 SCR MAMMO BI INCL CAD: CPT

## 2024-09-09 PROCEDURE — 76641 ULTRASOUND BREAST COMPLETE: CPT | Mod: 26,RT

## 2024-09-09 PROCEDURE — 77063 BREAST TOMOSYNTHESIS BI: CPT

## 2024-09-09 PROCEDURE — 76641 ULTRASOUND BREAST COMPLETE: CPT

## 2024-09-09 PROCEDURE — 77067 SCR MAMMO BI INCL CAD: CPT | Mod: 26,52

## 2024-09-09 PROCEDURE — 99214 OFFICE O/P EST MOD 30 MIN: CPT

## 2024-09-09 PROCEDURE — G2211 COMPLEX E/M VISIT ADD ON: CPT

## 2024-09-09 PROCEDURE — 77063 BREAST TOMOSYNTHESIS BI: CPT | Mod: 26,52

## 2024-09-09 PROCEDURE — 77067 SCR MAMMO BI INCL CAD: CPT | Mod: 26,RT,52

## 2024-09-09 NOTE — REASON FOR VISIT
[Follow-Up Visit] : a follow-up [Other: _____] : [unfilled] [FreeTextEntry2] : breast cancer ER/[place] +  HER-2 -

## 2024-09-09 NOTE — ASSESSMENT
[Curative] : Goals of care discussed with patient: Curative [FreeTextEntry1] : This is a 52-year-old very pleasant premenopausal lady, who is diagnosed with stage IIB (T2, N1, M0) ER/AK positive and HER-2/shaila negative left breast well to moderately differentiated invasive ductal carcinoma. She is status post left mastectomy and axillary lymph node dissection on 10/20/17. Intermediate oncotype. Completed TC x 4. BRCA NEGATIVE. Consented for PALLAS and on arm B  - Breast ca:  She is tolerating exemestane very well without significant side effects.  Reports good compliance. Plan to continue AI for total of 10 years due to node+ disease. She switched to Q3M lupron for convenience.  Estradiol suppressed and FSH is low 7/2021. 3/2024: On Lupron q3m, FSH 4-5 range. Doesn't want BSO..  She wanted to take a break from Lupron for 6 months.  We discussed to check FSH today.  It is still low.  It is consistent with pre/perimenopause.  Recommend to restart Lupron 22.5 mg every 3 months.  Will go to monthly dose if she has breakthrough bleeding. Not a candidate for tamoxifen due to h/o DVT Right  breast imaging Due 9/2024 - Aromatase inhibitor induced arthralgia:  Rec stretching exercises, physical therapy and ortho f/u.  - Hot flashes: 2/2 exemestane: Advised to wear layers and use fan prn. She could not tolerate effexor, but takes gabapentin prn. - Hair thinning- Likely 2/2 AI. Stable with Biotin - Osteoporosis: concern for worsening bone density due to exemestane. Dexa 6/2020 OSTEOPOROSIS, started prolia in july 2020. dexa 8/2022 improvement. T score -2.1. Continue prolia but insurance company is not covering it anymore. Will give a treatment break. repeat DEXA 8/2024. If worsening- will restart  - High cholesterol: concern for worsening cholesterol due to AI. Pt is on zocor. Lipid profile annually.She is noted to have elevated chol f/u with PCP. - Low Vitamin D: concern for worsening bone loss due to exemestane and low vit D. Discussed to increase Vit D intake. Check level next visit reminded to take Vit D suppls daily - Post mastectomy pain- resolved -Patient is in [facility] clinical  trial f/u phase   	 RTC 6 months MD

## 2024-09-09 NOTE — HISTORY OF PRESENT ILLNESS
[Date: ____________] : Patient's last distress assessment performed on [unfilled]. [6 - Distress Level] : Distress Level: 6 [Patient given social work contact information and resource sheet] : Patient was given social work contact information and resource sheet [IIB] : IIB [de-identified] : This is a very pleasant 54-year-old postmenopausal lady who was diagnosed with breast cancer. Her oncologic history is follows:  She underwent routine breast imaging on 5/4/17 which showed a spiculated lesion measuring 2.2 x 2.9 cm in the upper outer left breast. She underwent left breast 2:00 lesion core biopsy on 6/9/17 which showed invasive well-differentiated ductal carcinoma, Ej score 5/9, measuring 1.5 cm, ER 95%, OK 10% HER-2/shaila negative. She underwent a breast MRI on 7/15/17 which showed additional concerning areas for which biopsy was recommended. She underwent left breast 3:00 MRI biopsy on 7/18/17 which showed invasive well-differentiated ductal carcinoma, measuring 0.6 cm, [place] score 5/9 ER more than 90%, OK more than 90% HER-2/shaila negative.   She underwent left mastectomy and left sentinel axillary lymph node dissection on 9/28/17 which showed invasive moderately differentiated ductal carcinoma, Ej grade 2/3, measuring 4 cm, 0.3 cm from the margin. Lymphovascular invasion was noted. Low/intermediate grade DCIS was noted. One sentinel lymph node was positive for metastasis measuring 0.3 cm. 10 additional lymph nodes were negative.  s/p TC x4 completed in January.2018 superficial phlebitis in the right arm after cycle 4 chemo. No s/f thrombus felt but noted to have focal superficial thrombosis on sonogram; therefore, started on OS+AI instead of tamoxifen [de-identified] : \par  GENETICS: CANCERNEXT AMBRY PANEL NEGATIVE \par   [FreeTextEntry1] : lupron started 2/2018\par  exemestane 3/2018 [de-identified] : Ms. OSKAR SHARMA  is here for a follow up for left breast cancer on Lupron since 2/2018, and Exemestane since 3/2018, on Q3M lupron  9/11/2023 Takes exemestane daily, good compliance.  She was prescribed effexor and gabapentin for hot flashes but felt very tired and sleepy therefore was not using it. Hot flashes are still present but tolerable. Mild joint stiffness with prolonged inactivity.  She is active, no change in energy, wt or appetite.  No vag dryness, no excessive fatigue. Hair loss stable with biotin.  LMP 11/15/17. not sexually active. On Lupron q3m, FSH 4-5 range. Doesn't want BSO.. FSH still low 9/2023 ( ? if 2/2 lupron feedback inhibition). She has been on lupron for 6 yrs. Discussed to stop lupron and monitor for vag bleeding. Continue AI . If periods resume, would restart Lupron. Not a candidate for tamoxifen due to h/o DVT Mammo/sono right breast 9/2022 birads 2 Dexa 6/2020 OSTEOPOROSIS, started prolia in july 2020. dexa 8/2022 improvement. T score -2.1. Continue prolia but insurance company is not covering it anymore. Will give a treatment break. repeat DEXA 8/2024. If worsening- will restart  ct a/p 8/2020: Stable renal artery aneurysm  5 yrs out, research BW will be done  Bone scan 10/2020 [facility]. She is on eligard every 3 m. No vag bleeding   9/9/2024 Patient presents for 6 month follow-up. She continues to take Exemestane daily with good efficacy. She denies any significant side effects consistent with: hot flashes, arthralgias, hair thinning, vaginal dryness, GI s/e's, SOB or decrease in energy.  Breast Health: Right Digital Screening Mammogram and Right Breast US completed on 18 September 2023; BI-RADS 2. Updated imaging to be completed after in-office follow-up. Bone Health: DEXA completed in 6/2020 demonstrated OSTEOPOROSIS, started Prolia in July 2020. DEXA completed in 8/2022 showed improvement; T score -2.1 in the Spine. Insurance company has since denied injection; treatment break given. Last DEXA Bone Density screening completed on 9 August 2024; imaging consistent with Osteopenia located in the Spine (-2.2), and Femoral Neck (-1.4). Imaging to be repeated in 2 years; next due: 8/26. Last Eligard injection completed March 2024. Denies vaginal bleeding. LMP 2017 during chemotherapy with no return.  She wanted to take a break from Lupron for 6 months.  We discussed to check FSH today.  It is still low.  It is consistent with pre/perimenopause.  Recommend to restart Lupron 22.5 mg every 3 months.  Will go to monthly dose if she has breakthrough bleeding. RTO: 6 MONTHS

## 2024-09-09 NOTE — PHYSICAL EXAM
[Restricted in physically strenuous activity but ambulatory and able to carry out work of a light or sedentary nature] : Status 1- Restricted in physically strenuous activity but ambulatory and able to carry out work of a light or sedentary nature, e.g., light house work, office work [Normal] : affect appropriate [de-identified] : s/p left mastectomy with reconstruction, noted to have a palpable findings at 2:00 left reconstruction: stable since last visit. s/p right breast reduction mammoplasty for symmetry

## 2024-09-09 NOTE — HISTORY OF PRESENT ILLNESS
[Date: ____________] : Patient's last distress assessment performed on [unfilled]. [6 - Distress Level] : Distress Level: 6 [Patient given social work contact information and resource sheet] : Patient was given social work contact information and resource sheet [IIB] : IIB [de-identified] : This is a very pleasant 54-year-old postmenopausal lady who was diagnosed with breast cancer. Her oncologic history is follows:  She underwent routine breast imaging on 5/4/17 which showed a spiculated lesion measuring 2.2 x 2.9 cm in the upper outer left breast. She underwent left breast 2:00 lesion core biopsy on 6/9/17 which showed invasive well-differentiated ductal carcinoma, Ej score 5/9, measuring 1.5 cm, ER 95%, IA 10% HER-2/shaila negative. She underwent a breast MRI on 7/15/17 which showed additional concerning areas for which biopsy was recommended. She underwent left breast 3:00 MRI biopsy on 7/18/17 which showed invasive well-differentiated ductal carcinoma, measuring 0.6 cm, [place] score 5/9 ER more than 90%, IA more than 90% HER-2/shaila negative.   She underwent left mastectomy and left sentinel axillary lymph node dissection on 9/28/17 which showed invasive moderately differentiated ductal carcinoma, Ej grade 2/3, measuring 4 cm, 0.3 cm from the margin. Lymphovascular invasion was noted. Low/intermediate grade DCIS was noted. One sentinel lymph node was positive for metastasis measuring 0.3 cm. 10 additional lymph nodes were negative.  s/p TC x4 completed in January.2018 superficial phlebitis in the right arm after cycle 4 chemo. No s/f thrombus felt but noted to have focal superficial thrombosis on sonogram; therefore, started on OS+AI instead of tamoxifen [de-identified] : \par  GENETICS: CANCERNEXT AMBRY PANEL NEGATIVE \par   [FreeTextEntry1] : lupron started 2/2018\par  exemestane 3/2018 [de-identified] : Ms. OSKAR SHARMA  is here for a follow up for left breast cancer on Lupron since 2/2018, and Exemestane since 3/2018, on Q3M lupron  9/11/2023 Takes exemestane daily, good compliance.  She was prescribed effexor and gabapentin for hot flashes but felt very tired and sleepy therefore was not using it. Hot flashes are still present but tolerable. Mild joint stiffness with prolonged inactivity.  She is active, no change in energy, wt or appetite.  No vag dryness, no excessive fatigue. Hair loss stable with biotin.  LMP 11/15/17. not sexually active. On Lupron q3m, FSH 4-5 range. Doesn't want BSO.. FSH still low 9/2023 ( ? if 2/2 lupron feedback inhibition). She has been on lupron for 6 yrs. Discussed to stop lupron and monitor for vag bleeding. Continue AI . If periods resume, would restart Lupron. Not a candidate for tamoxifen due to h/o DVT Mammo/sono right breast 9/2022 birads 2 Dexa 6/2020 OSTEOPOROSIS, started prolia in july 2020. dexa 8/2022 improvement. T score -2.1. Continue prolia but insurance company is not covering it anymore. Will give a treatment break. repeat DEXA 8/2024. If worsening- will restart  ct a/p 8/2020: Stable renal artery aneurysm  5 yrs out, research BW will be done  Bone scan 10/2020 [facility]. She is on eligard every 3 m. No vag bleeding   9/9/2024 Patient presents for 6 month follow-up. She continues to take Exemestane daily with good efficacy. She denies any significant side effects consistent with: hot flashes, arthralgias, hair thinning, vaginal dryness, GI s/e's, SOB or decrease in energy.  Breast Health: Right Digital Screening Mammogram and Right Breast US completed on 18 September 2023; BI-RADS 2. Updated imaging to be completed after in-office follow-up. Bone Health: DEXA completed in 6/2020 demonstrated OSTEOPOROSIS, started Prolia in July 2020. DEXA completed in 8/2022 showed improvement; T score -2.1 in the Spine. Insurance company has since denied injection; treatment break given. Last DEXA Bone Density screening completed on 9 August 2024; imaging consistent with Osteopenia located in the Spine (-2.2), and Femoral Neck (-1.4). Imaging to be repeated in 2 years; next due: 8/26. Last Eligard injection completed March 2024. Denies vaginal bleeding. LMP 2017 during chemotherapy with no return.  She wanted to take a break from Lupron for 6 months.  We discussed to check FSH today.  It is still low.  It is consistent with pre/perimenopause.  Recommend to restart Lupron 22.5 mg every 3 months.  Will go to monthly dose if she has breakthrough bleeding. RTO: 6 MONTHS

## 2024-09-09 NOTE — PHYSICAL EXAM
[Restricted in physically strenuous activity but ambulatory and able to carry out work of a light or sedentary nature] : Status 1- Restricted in physically strenuous activity but ambulatory and able to carry out work of a light or sedentary nature, e.g., light house work, office work [Normal] : affect appropriate [de-identified] : s/p left mastectomy with reconstruction, noted to have a palpable findings at 2:00 left reconstruction: stable since last visit. s/p right breast reduction mammoplasty for symmetry

## 2024-09-09 NOTE — BEGINNING OF VISIT
[0] : 2) Feeling down, depressed, or hopeless: Not at all (0) [[id number]] : 0 [Pain Scale: ___] : On a scale of 1-10, today the patient's pain is a(n) [unfilled]. [Never] : Never [Patient/Caregiver unclear of wishes] : Patient/Caregiver unclear of wishes [Diarrhea Character] : Diarrhea: Grade 0

## 2024-09-09 NOTE — ASSESSMENT
[Curative] : Goals of care discussed with patient: Curative [FreeTextEntry1] : This is a 52-year-old very pleasant premenopausal lady, who is diagnosed with stage IIB (T2, N1, M0) ER/FL positive and HER-2/shaila negative left breast well to moderately differentiated invasive ductal carcinoma. She is status post left mastectomy and axillary lymph node dissection on 10/20/17. Intermediate oncotype. Completed TC x 4. BRCA NEGATIVE. Consented for P[facility]AS and on arm B  - Breast ca:  She is tolerating exemestane very well without significant side effects.  Reports good compliance. Plan to continue AI for total of 10 years due to node+ disease. She switched to Q3M lupron for convenience.  Estradiol suppressed and FSH is low 7/2021. 3/2024: On Lupron q3m, FSH 4-5 range. Doesn't want BSO..  She wanted to take a break from Lupron for 6 months.  We discussed to check FSH today.  It is still low.  It is consistent with pre/perimenopause.  Recommend to restart Lupron 22.5 mg every 3 months.  Will go to monthly dose if she has breakthrough bleeding. Not a candidate for tamoxifen due to h/o DVT Right  breast imaging Due 9/2024 - Aromatase inhibitor induced arthralgia:  Rec stretching exercises, physical therapy and ortho f/u.  - Hot flashes: 2/2 exemestane: Advised to wear layers and use fan prn. She could not tolerate effexor, but takes gabapentin prn. - Hair thinning- Likely 2/2 AI. Stable with Biotin - Osteoporosis: concern for worsening bone density due to exemestane. Dexa 6/2020 OSTEOPOROSIS, started prolia in july 2020. dexa 8/2022 improvement. T score -2.1. Continue prolia but insurance company is not covering it anymore. Will give a treatment break. repeat DEXA 8/2024. If worsening- will restart  - High cholesterol: concern for worsening cholesterol due to AI. Pt is on zocor. Lipid profile annually.She is noted to have elevated chol f/u with PCP. - Low Vitamin D: concern for worsening bone loss due to exemestane and low vit D. Discussed to increase Vit D intake. Check level next visit reminded to take Vit D suppls daily - Post mastectomy pain- resolved -Patient is in [facility] clinical  trial f/u phase   	 RTC 6 months MD

## 2024-09-09 NOTE — REASON FOR VISIT
[Follow-Up Visit] : a follow-up [Other: _____] : [unfilled] [FreeTextEntry2] : breast cancer ER/[place] +  HER-2 -

## 2024-09-09 NOTE — HISTORY OF PRESENT ILLNESS
[Date: ____________] : Patient's last distress assessment performed on [unfilled]. [6 - Distress Level] : Distress Level: 6 [Patient given social work contact information and resource sheet] : Patient was given social work contact information and resource sheet [IIB] : IIB [de-identified] : This is a very pleasant 54-year-old postmenopausal lady who was diagnosed with breast cancer. Her oncologic history is follows:  She underwent routine breast imaging on 5/4/17 which showed a spiculated lesion measuring 2.2 x 2.9 cm in the upper outer left breast. She underwent left breast 2:00 lesion core biopsy on 6/9/17 which showed invasive well-differentiated ductal carcinoma, Ej score 5/9, measuring 1.5 cm, ER 95%, [place] 10% HER-2/shaila negative. She underwent a breast MRI on 7/15/17 which showed additional concerning areas for which biopsy was recommended. She underwent left breast 3:00 MRI biopsy on 7/18/17 which showed invasive well-differentiated ductal carcinoma, measuring 0.6 cm, [place] score 5/9 ER more than 90%, [place] more than 90% HER-2/shaila negative.   She underwent left mastectomy and left sentinel axillary lymph node dissection on 9/28/17 which showed invasive moderately differentiated ductal carcinoma, Ej grade 2/3, measuring 4 cm, 0.3 cm from the margin. Lymphovascular invasion was noted. Low/intermediate grade DCIS was noted. One sentinel lymph node was positive for metastasis measuring 0.3 cm. 10 additional lymph nodes were negative.  s/p TC x4 completed in January.2018 superficial phlebitis in the right arm after cycle 4 chemo. No s/f thrombus felt but noted to have focal superficial thrombosis on sonogram; therefore, started on OS+AI instead of tamoxifen [de-identified] : \par  GENETICS: CANCERNEXT AMBRY PANEL NEGATIVE \par   [FreeTextEntry1] : lupron started 2/2018\par  exemestane 3/2018 [de-identified] : Ms. OSKAR SHARMA  is here for a follow up for left breast cancer on Lupron since 2/2018, and Exemestane since 3/2018, on Q3M lupron  9/11/2023 Takes exemestane daily, good compliance.  She was prescribed effexor and gabapentin for hot flashes but felt very tired and sleepy therefore was not using it. Hot flashes are still present but tolerable. Mild joint stiffness with prolonged inactivity.  She is active, no change in energy, wt or appetite.  No vag dryness, no excessive fatigue. Hair loss stable with biotin.  LMP 11/15/17. not sexually active. On Lupron q3m, FSH 4-5 range. Doesn't want BSO.. FSH still low 9/2023 ( ? if 2/2 lupron feedback inhibition). She has been on lupron for 6 yrs. Discussed to stop lupron and monitor for vag bleeding. Continue AI . If periods resume, would restart Lupron. Not a candidate for tamoxifen due to h/o DVT Mammo/sono right breast 9/2022 birads 2 Dexa 6/2020 OSTEOPOROSIS, started prolia in july 2020. dexa 8/2022 improvement. T score -2.1. Continue prolia but insurance company is not covering it anymore. Will give a treatment break. repeat DEXA 8/2024. If worsening- will restart  ct a/p 8/2020: Stable renal artery aneurysm  5 yrs out, research BW will be done  Bone scan 10/2020 [facility]. She is on eligard every 3 m. No vag bleeding   9/9/2024 Patient presents for 6 month follow-up. She continues to take Exemestane daily with good efficacy. She denies any significant side effects consistent with: hot flashes, arthralgias, hair thinning, vaginal dryness, GI s/e's, SOB or decrease in energy.  Breast Health: Right Digital Screening Mammogram and Right Breast US completed on 18 September 2023; BI-RADS 2. Updated imaging to be completed after in-office follow-up. Bone Health: DEXA completed in 6/2020 demonstrated OSTEOPOROSIS, started Prolia in July 2020. DEXA completed in 8/2022 showed improvement; T score -2.1 in the Spine. Insurance company has since denied injection; treatment break given. Last DEXA Bone Density screening completed on 9 August 2024; imaging consistent with Osteopenia located in the Spine (-2.2), and Femoral Neck (-1.4). Imaging to be repeated in 2 years; next due: 8/26. Last Eligard injection completed March 2024. Denies vaginal bleeding. LMP 2017 during chemotherapy with no return.  She wanted to take a break from Lupron for 6 months.  We discussed to check FSH today.  It is still low.  It is consistent with pre/perimenopause.  Recommend to restart Lupron 22.5 mg every 3 months.  Will go to monthly dose if she has breakthrough bleeding. RTO: 6 MONTHS

## 2024-09-09 NOTE — BEGINNING OF VISIT
[0] : 2) Feeling down, depressed, or hopeless: Not at all (0) [[id number]] : 0 [Pain Scale: ___] : On a scale of 1-10, today the patient's pain is a(n) [unfilled]. [Never] : Never [Patient/Caregiver unclear of wishes] : Patient/Caregiver unclear of wishes [Diarrhea Character] : Diarrhea: Grade 0

## 2024-09-09 NOTE — PHYSICAL EXAM
[Restricted in physically strenuous activity but ambulatory and able to carry out work of a light or sedentary nature] : Status 1- Restricted in physically strenuous activity but ambulatory and able to carry out work of a light or sedentary nature, e.g., light house work, office work [Normal] : affect appropriate [de-identified] : s/p left mastectomy with reconstruction, noted to have a palpable findings at 2:00 left reconstruction: stable since last visit. s/p right breast reduction mammoplasty for symmetry

## 2024-09-10 LAB
ALBUMIN SERPL ELPH-MCNC: 4.5 G/DL
ALP BLD-CCNC: 97 U/L
ALT SERPL-CCNC: 19 U/L
ANION GAP SERPL CALC-SCNC: 12 MMOL/L
AST SERPL-CCNC: 16 U/L
BILIRUB SERPL-MCNC: 0.8 MG/DL
BUN SERPL-MCNC: 15 MG/DL
CALCIUM SERPL-MCNC: 10.3 MG/DL
CANCER AG27-29 SERPL-ACNC: 26.7 U/ML
CEA SERPL-MCNC: 2.7 NG/ML
CHLORIDE SERPL-SCNC: 105 MMOL/L
CO2 SERPL-SCNC: 25 MMOL/L
CREAT SERPL-MCNC: 0.73 MG/DL
EGFR: 96 ML/MIN/1.73M2
FSH SERPL-MCNC: 5.7 IU/L
GLUCOSE SERPL-MCNC: 114 MG/DL
POTASSIUM SERPL-SCNC: 4.7 MMOL/L
PROT SERPL-MCNC: 7.5 G/DL
SODIUM SERPL-SCNC: 142 MMOL/L

## 2024-09-16 LAB — ESTRADIOL ULTRASENSITIVE: <2.5 PG/ML

## 2024-09-20 ENCOUNTER — APPOINTMENT (OUTPATIENT)
Dept: INFUSION THERAPY | Facility: HOSPITAL | Age: 54
End: 2024-09-20

## 2024-12-05 ENCOUNTER — OUTPATIENT (OUTPATIENT)
Dept: OUTPATIENT SERVICES | Facility: HOSPITAL | Age: 54
LOS: 1 days | Discharge: HOME | End: 2024-12-05

## 2024-12-05 DIAGNOSIS — C50.912 MALIGNANT NEOPLASM OF UNSPECIFIED SITE OF LEFT FEMALE BREAST: ICD-10-CM

## 2024-12-05 DIAGNOSIS — Z90.12 ACQUIRED ABSENCE OF LEFT BREAST AND NIPPLE: Chronic | ICD-10-CM

## 2024-12-13 ENCOUNTER — APPOINTMENT (OUTPATIENT)
Dept: INFUSION THERAPY | Facility: HOSPITAL | Age: 54
End: 2024-12-13

## 2025-03-07 ENCOUNTER — OUTPATIENT (OUTPATIENT)
Dept: OUTPATIENT SERVICES | Facility: HOSPITAL | Age: 55
LOS: 1 days | Discharge: HOME | End: 2025-03-07

## 2025-03-07 DIAGNOSIS — Z90.12 ACQUIRED ABSENCE OF LEFT BREAST AND NIPPLE: Chronic | ICD-10-CM

## 2025-03-07 DIAGNOSIS — C50.912 MALIGNANT NEOPLASM OF UNSPECIFIED SITE OF LEFT FEMALE BREAST: ICD-10-CM

## 2025-03-10 ENCOUNTER — RESULT REVIEW (OUTPATIENT)
Age: 55
End: 2025-03-10

## 2025-03-10 ENCOUNTER — APPOINTMENT (OUTPATIENT)
Dept: HEMATOLOGY ONCOLOGY | Facility: CLINIC | Age: 55
End: 2025-03-10
Payer: COMMERCIAL

## 2025-03-10 ENCOUNTER — APPOINTMENT (OUTPATIENT)
Dept: INFUSION THERAPY | Facility: HOSPITAL | Age: 55
End: 2025-03-10

## 2025-03-10 VITALS
HEIGHT: 60.87 IN | BODY MASS INDEX: 27.06 KG/M2 | SYSTOLIC BLOOD PRESSURE: 156 MMHG | OXYGEN SATURATION: 99 % | WEIGHT: 143.3 LBS | RESPIRATION RATE: 16 BRPM | HEART RATE: 69 BPM | DIASTOLIC BLOOD PRESSURE: 93 MMHG | TEMPERATURE: 97.3 F

## 2025-03-10 DIAGNOSIS — C50.919 MALIGNANT NEOPLASM OF UNSPECIFIED SITE OF UNSPECIFIED FEMALE BREAST: ICD-10-CM

## 2025-03-10 DIAGNOSIS — M85.80 OTHER SPECIFIED DISORDERS OF BONE DENSITY AND STRUCTURE, UNSPECIFIED SITE: ICD-10-CM

## 2025-03-10 LAB
BASOPHILS # BLD AUTO: 0.06 K/UL — SIGNIFICANT CHANGE UP (ref 0–0.2)
BASOPHILS NFR BLD AUTO: 0.7 % — SIGNIFICANT CHANGE UP (ref 0–2)
EOSINOPHIL # BLD AUTO: 0.22 K/UL — SIGNIFICANT CHANGE UP (ref 0–0.5)
EOSINOPHIL NFR BLD AUTO: 2.5 % — SIGNIFICANT CHANGE UP (ref 0–6)
HCT VFR BLD CALC: 37.1 % — SIGNIFICANT CHANGE UP (ref 34.5–45)
HGB BLD-MCNC: 12.6 G/DL — SIGNIFICANT CHANGE UP (ref 11.5–15.5)
IMM GRANULOCYTES NFR BLD AUTO: 0.3 % — SIGNIFICANT CHANGE UP (ref 0–0.9)
LYMPHOCYTES # BLD AUTO: 2.81 K/UL — SIGNIFICANT CHANGE UP (ref 1–3.3)
LYMPHOCYTES # BLD AUTO: 32.1 % — SIGNIFICANT CHANGE UP (ref 13–44)
MCHC RBC-ENTMCNC: 29.5 PG — SIGNIFICANT CHANGE UP (ref 27–34)
MCHC RBC-ENTMCNC: 34 G/DL — SIGNIFICANT CHANGE UP (ref 32–36)
MCV RBC AUTO: 86.9 FL — SIGNIFICANT CHANGE UP (ref 80–100)
MONOCYTES # BLD AUTO: 0.47 K/UL — SIGNIFICANT CHANGE UP (ref 0–0.9)
MONOCYTES NFR BLD AUTO: 5.4 % — SIGNIFICANT CHANGE UP (ref 2–14)
NEUTROPHILS # BLD AUTO: 5.16 K/UL — SIGNIFICANT CHANGE UP (ref 1.8–7.4)
NEUTROPHILS NFR BLD AUTO: 59 % — SIGNIFICANT CHANGE UP (ref 43–77)
NRBC BLD AUTO-RTO: 0 /100 WBCS — SIGNIFICANT CHANGE UP (ref 0–0)
PLATELET # BLD AUTO: 245 K/UL — SIGNIFICANT CHANGE UP (ref 150–400)
RBC # BLD: 4.27 M/UL — SIGNIFICANT CHANGE UP (ref 3.8–5.2)
RBC # FLD: 13.2 % — SIGNIFICANT CHANGE UP (ref 10.3–14.5)
WBC # BLD: 8.75 K/UL — SIGNIFICANT CHANGE UP (ref 3.8–10.5)
WBC # FLD AUTO: 8.75 K/UL — SIGNIFICANT CHANGE UP (ref 3.8–10.5)

## 2025-03-10 PROCEDURE — G2211 COMPLEX E/M VISIT ADD ON: CPT

## 2025-03-10 PROCEDURE — 99214 OFFICE O/P EST MOD 30 MIN: CPT

## 2025-03-11 LAB
ALBUMIN SERPL ELPH-MCNC: 4.2 G/DL
ALP BLD-CCNC: 73 U/L
ALT SERPL-CCNC: 25 U/L
ANION GAP SERPL CALC-SCNC: 8 MMOL/L
AST SERPL-CCNC: 20 U/L
BILIRUB SERPL-MCNC: 0.6 MG/DL
BUN SERPL-MCNC: 15 MG/DL
CALCIUM SERPL-MCNC: 10 MG/DL
CANCER AG27-29 SERPL-ACNC: 32.9 U/ML
CEA SERPL-MCNC: 2.7 NG/ML
CHLORIDE SERPL-SCNC: 104 MMOL/L
CO2 SERPL-SCNC: 30 MMOL/L
CREAT SERPL-MCNC: 0.79 MG/DL
EGFRCR SERPLBLD CKD-EPI 2021: 88 ML/MIN/1.73M2
GLUCOSE SERPL-MCNC: 118 MG/DL
POTASSIUM SERPL-SCNC: 4 MMOL/L
PROT SERPL-MCNC: 7.4 G/DL
SODIUM SERPL-SCNC: 141 MMOL/L

## 2025-06-04 ENCOUNTER — OUTPATIENT (OUTPATIENT)
Dept: OUTPATIENT SERVICES | Facility: HOSPITAL | Age: 55
LOS: 1 days | Discharge: HOME | End: 2025-06-04

## 2025-06-04 DIAGNOSIS — C50.912 MALIGNANT NEOPLASM OF UNSPECIFIED SITE OF LEFT FEMALE BREAST: ICD-10-CM

## 2025-06-04 DIAGNOSIS — Z90.12 ACQUIRED ABSENCE OF LEFT BREAST AND NIPPLE: Chronic | ICD-10-CM

## 2025-06-11 ENCOUNTER — APPOINTMENT (OUTPATIENT)
Dept: INFUSION THERAPY | Facility: HOSPITAL | Age: 55
End: 2025-06-11

## 2025-09-06 ENCOUNTER — NON-APPOINTMENT (OUTPATIENT)
Age: 55
End: 2025-09-06

## 2025-09-08 ENCOUNTER — RESULT REVIEW (OUTPATIENT)
Age: 55
End: 2025-09-08

## 2025-09-08 ENCOUNTER — APPOINTMENT (OUTPATIENT)
Dept: HEMATOLOGY ONCOLOGY | Facility: CLINIC | Age: 55
End: 2025-09-08
Payer: COMMERCIAL

## 2025-09-08 ENCOUNTER — APPOINTMENT (OUTPATIENT)
Dept: INFUSION THERAPY | Facility: HOSPITAL | Age: 55
End: 2025-09-08

## 2025-09-08 VITALS
SYSTOLIC BLOOD PRESSURE: 202 MMHG | WEIGHT: 136.68 LBS | RESPIRATION RATE: 16 BRPM | BODY MASS INDEX: 25.94 KG/M2 | DIASTOLIC BLOOD PRESSURE: 112 MMHG | TEMPERATURE: 97.2 F | OXYGEN SATURATION: 99 % | HEART RATE: 60 BPM

## 2025-09-08 DIAGNOSIS — Z79.811 LONG TERM (CURRENT) USE OF AROMATASE INHIBITORS: ICD-10-CM

## 2025-09-08 DIAGNOSIS — C50.919 MALIGNANT NEOPLASM OF UNSPECIFIED SITE OF UNSPECIFIED FEMALE BREAST: ICD-10-CM

## 2025-09-08 DIAGNOSIS — M81.0 AGE-RELATED OSTEOPOROSIS W/OUT CURRENT PATHOLOGICAL FRACTURE: ICD-10-CM

## 2025-09-08 PROCEDURE — G2211 COMPLEX E/M VISIT ADD ON: CPT

## 2025-09-08 PROCEDURE — 99214 OFFICE O/P EST MOD 30 MIN: CPT

## 2025-09-09 LAB
ALBUMIN SERPL ELPH-MCNC: 4.6 G/DL
ALP BLD-CCNC: 74 U/L
ALT SERPL-CCNC: 28 U/L
ANION GAP SERPL CALC-SCNC: 10 MMOL/L
AST SERPL-CCNC: 20 U/L
BILIRUB SERPL-MCNC: 0.6 MG/DL
BUN SERPL-MCNC: 10 MG/DL
CALCIUM SERPL-MCNC: 10.6 MG/DL
CANCER AG27-29 SERPL-ACNC: 26.8 U/ML
CEA SERPL-MCNC: 2.9 NG/ML
CHLORIDE SERPL-SCNC: 101 MMOL/L
CO2 SERPL-SCNC: 28 MMOL/L
CREAT SERPL-MCNC: 0.82 MG/DL
EGFRCR SERPLBLD CKD-EPI 2021: 83 ML/MIN/1.73M2
ESTRADIOL SERPL-MCNC: 12 PG/ML
FSH SERPL-MCNC: 6 IU/L
GLUCOSE SERPL-MCNC: 93 MG/DL
POTASSIUM SERPL-SCNC: 4.4 MMOL/L
PROT SERPL-MCNC: 7.8 G/DL
SODIUM SERPL-SCNC: 140 MMOL/L

## 2025-09-12 ENCOUNTER — RESULT REVIEW (OUTPATIENT)
Age: 55
End: 2025-09-12

## 2025-09-12 ENCOUNTER — APPOINTMENT (OUTPATIENT)
Dept: MAMMOGRAPHY | Facility: IMAGING CENTER | Age: 55
End: 2025-09-12
Payer: COMMERCIAL

## 2025-09-12 ENCOUNTER — APPOINTMENT (OUTPATIENT)
Dept: ULTRASOUND IMAGING | Facility: IMAGING CENTER | Age: 55
End: 2025-09-12
Payer: COMMERCIAL

## 2025-09-12 PROCEDURE — 77063 BREAST TOMOSYNTHESIS BI: CPT | Mod: 26,52

## 2025-09-12 PROCEDURE — 77067 SCR MAMMO BI INCL CAD: CPT | Mod: 26,RT,52

## 2025-09-12 PROCEDURE — 76641 ULTRASOUND BREAST COMPLETE: CPT | Mod: 26,RT
